# Patient Record
Sex: FEMALE | Race: OTHER | HISPANIC OR LATINO
[De-identification: names, ages, dates, MRNs, and addresses within clinical notes are randomized per-mention and may not be internally consistent; named-entity substitution may affect disease eponyms.]

---

## 2017-10-05 ENCOUNTER — APPOINTMENT (OUTPATIENT)
Dept: DERMATOLOGY | Facility: CLINIC | Age: 57
End: 2017-10-05
Payer: MEDICAID

## 2017-10-05 PROCEDURE — 99201 OFFICE OUTPATIENT NEW 10 MINUTES: CPT

## 2018-02-02 ENCOUNTER — OUTPATIENT (OUTPATIENT)
Dept: OUTPATIENT SERVICES | Facility: HOSPITAL | Age: 58
LOS: 1 days | End: 2018-02-02
Payer: COMMERCIAL

## 2018-02-02 ENCOUNTER — TRANSCRIPTION ENCOUNTER (OUTPATIENT)
Age: 58
End: 2018-02-02

## 2018-02-02 ENCOUNTER — RESULT REVIEW (OUTPATIENT)
Age: 58
End: 2018-02-02

## 2018-02-02 DIAGNOSIS — Z12.11 ENCOUNTER FOR SCREENING FOR MALIGNANT NEOPLASM OF COLON: ICD-10-CM

## 2018-02-02 DIAGNOSIS — Z98.89 OTHER SPECIFIED POSTPROCEDURAL STATES: Chronic | ICD-10-CM

## 2018-02-02 PROCEDURE — 88305 TISSUE EXAM BY PATHOLOGIST: CPT | Mod: 26

## 2018-02-02 PROCEDURE — T1013: CPT

## 2018-02-02 PROCEDURE — 45380 COLONOSCOPY AND BIOPSY: CPT | Mod: PT

## 2018-02-06 LAB — SURGICAL PATHOLOGY FINAL REPORT - CH: SIGNIFICANT CHANGE UP

## 2018-02-21 ENCOUNTER — OUTPATIENT (OUTPATIENT)
Dept: OUTPATIENT SERVICES | Facility: HOSPITAL | Age: 58
LOS: 1 days | End: 2018-02-21
Payer: COMMERCIAL

## 2018-02-21 DIAGNOSIS — Z51.89 ENCOUNTER FOR OTHER SPECIFIED AFTERCARE: ICD-10-CM

## 2018-02-21 DIAGNOSIS — M54.5 LOW BACK PAIN: ICD-10-CM

## 2018-02-21 DIAGNOSIS — Z98.89 OTHER SPECIFIED POSTPROCEDURAL STATES: Chronic | ICD-10-CM

## 2018-02-23 ENCOUNTER — APPOINTMENT (OUTPATIENT)
Dept: DERMATOLOGY | Facility: CLINIC | Age: 58
End: 2018-02-23
Payer: MEDICAID

## 2018-02-23 PROCEDURE — 17110 DESTRUCTION B9 LES UP TO 14: CPT

## 2018-02-23 PROCEDURE — 99213 OFFICE O/P EST LOW 20 MIN: CPT | Mod: 25

## 2018-02-27 ENCOUNTER — APPOINTMENT (OUTPATIENT)
Dept: DERMATOLOGY | Facility: CLINIC | Age: 58
End: 2018-02-27
Payer: MEDICAID

## 2018-02-27 PROCEDURE — 99214 OFFICE O/P EST MOD 30 MIN: CPT | Mod: 24

## 2018-03-06 ENCOUNTER — APPOINTMENT (OUTPATIENT)
Dept: DERMATOLOGY | Facility: CLINIC | Age: 58
End: 2018-03-06
Payer: MEDICAID

## 2018-03-06 PROCEDURE — 95044 PATCH/APPLICATION TESTS: CPT

## 2018-03-08 ENCOUNTER — APPOINTMENT (OUTPATIENT)
Dept: DERMATOLOGY | Facility: CLINIC | Age: 58
End: 2018-03-08
Payer: MEDICAID

## 2018-03-08 PROCEDURE — 99213 OFFICE O/P EST LOW 20 MIN: CPT

## 2018-03-10 ENCOUNTER — APPOINTMENT (OUTPATIENT)
Dept: DERMATOLOGY | Facility: CLINIC | Age: 58
End: 2018-03-10
Payer: MEDICAID

## 2018-03-10 PROCEDURE — 99213 OFFICE O/P EST LOW 20 MIN: CPT

## 2018-03-29 PROCEDURE — 97110 THERAPEUTIC EXERCISES: CPT

## 2018-03-29 PROCEDURE — 97162 PT EVAL MOD COMPLEX 30 MIN: CPT

## 2018-03-29 PROCEDURE — 97010 HOT OR COLD PACKS THERAPY: CPT

## 2018-03-29 PROCEDURE — 97140 MANUAL THERAPY 1/> REGIONS: CPT

## 2018-07-13 ENCOUNTER — EMERGENCY (EMERGENCY)
Facility: HOSPITAL | Age: 58
LOS: 1 days | Discharge: HOME | End: 2018-07-13
Attending: EMERGENCY MEDICINE
Payer: COMMERCIAL

## 2018-07-13 VITALS
HEIGHT: 60 IN | HEART RATE: 75 BPM | TEMPERATURE: 98 F | DIASTOLIC BLOOD PRESSURE: 81 MMHG | WEIGHT: 229.94 LBS | OXYGEN SATURATION: 96 % | SYSTOLIC BLOOD PRESSURE: 142 MMHG | RESPIRATION RATE: 18 BRPM

## 2018-07-13 DIAGNOSIS — Z98.89 OTHER SPECIFIED POSTPROCEDURAL STATES: Chronic | ICD-10-CM

## 2018-07-13 PROCEDURE — T1013: CPT

## 2018-07-13 PROCEDURE — 73562 X-RAY EXAM OF KNEE 3: CPT

## 2018-07-13 PROCEDURE — 99283 EMERGENCY DEPT VISIT LOW MDM: CPT

## 2018-07-13 PROCEDURE — 73562 X-RAY EXAM OF KNEE 3: CPT | Mod: 26,LT

## 2018-07-13 RX ORDER — ACETAMINOPHEN 500 MG
2 TABLET ORAL
Qty: 45 | Refills: 0 | OUTPATIENT
Start: 2018-07-13

## 2018-07-13 RX ORDER — AMLODIPINE BESYLATE 2.5 MG/1
1 TABLET ORAL
Qty: 0 | Refills: 0 | COMMUNITY

## 2018-07-13 RX ORDER — IBUPROFEN 200 MG
600 TABLET ORAL ONCE
Qty: 0 | Refills: 0 | Status: COMPLETED | OUTPATIENT
Start: 2018-07-13 | End: 2018-07-13

## 2018-07-13 RX ADMIN — Medication 600 MILLIGRAM(S): at 10:29

## 2018-07-13 NOTE — ED ADULT TRIAGE NOTE - CHIEF COMPLAINT QUOTE
c/o fell off a chair 4 mths ago , sustained a left knee injury that never got better, now c/o pain to low back and left knee, went to physical therapy for  few mths, not better

## 2018-07-13 NOTE — ED STATDOCS - PLAN OF CARE
Apply ice to affected area for 15-20 minutes every few hours for the next few days.  Use as much or as frequently as desired. Tylenol extra strength 2 tablets every 4 hours or Ibuprofen 600mg (3 tablets) every 6 hours as needed for aches, pains. Return immediately to the ER for re-evaluation if your symptoms recur or worsening. Otherwise, follow-up with PMD or orthopedics in 1-2 weeks for reassessment: call today to arrange an appointment

## 2018-07-13 NOTE — ED STATDOCS - CARE PLAN
Principal Discharge DX:	Acute pain of right knee  Assessment and plan of treatment:	Apply ice to affected area for 15-20 minutes every few hours for the next few days.  Use as much or as frequently as desired. Tylenol extra strength 2 tablets every 4 hours or Ibuprofen 600mg (3 tablets) every 6 hours as needed for aches, pains. Return immediately to the ER for re-evaluation if your symptoms recur or worsening. Otherwise, follow-up with PMD or orthopedics in 1-2 weeks for reassessment: call today to arrange an appointment

## 2018-07-13 NOTE — ED STATDOCS - MUSCULOSKELETAL, MLM
no gross deformity of extremity, FROM active and passive, (-) crepitus with ROM, (-) effusion, (-) ballottement,  (-) tenderness patella, (-) tenderness proximal fibula, (-) tenderness femoral condyles, no gross ligamentous instability:  (-) drawer/ lachmans, (-) lateral pivot, (-) Mike.

## 2018-07-13 NOTE — ED ADULT NURSE NOTE - PMH
High cholesterol    Hypertension    Morbid obesity with BMI of 40.0-44.9, adult    Panic attacks    Sleep apnea with use of continuous positive airway pressure (CPAP)

## 2018-07-13 NOTE — ED STATDOCS - OBJECTIVE STATEMENT
hospital  (nimesh): fall from chair 5 mths ago resulting in low back pain, went to PT twice a week for 4 wks with some improvement.  Reports pain in left knee for past 2 weeks.  Denies trauma or injury.  Denies prior knee problems.  Also with continued back pain.  Taking OTC meds (cannot provide name).  PMD: Radha.

## 2018-07-13 NOTE — ED ADULT NURSE NOTE - OBJECTIVE STATEMENT
Patient arrived to ED today with c/o lower back pain and left leg pain after falling off a plastic chair 4 months ago.  Patient states she went to therapy and still has pain.  Patient denies LOC during fall.  Patient denies chest pain, SOB, numbness or tingling, headache, dizziness.
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
## 2018-08-30 ENCOUNTER — OUTPATIENT (OUTPATIENT)
Dept: OUTPATIENT SERVICES | Facility: HOSPITAL | Age: 58
LOS: 1 days | End: 2018-08-30
Payer: COMMERCIAL

## 2018-08-30 DIAGNOSIS — M25.562 PAIN IN LEFT KNEE: ICD-10-CM

## 2018-08-30 DIAGNOSIS — Z98.89 OTHER SPECIFIED POSTPROCEDURAL STATES: Chronic | ICD-10-CM

## 2018-08-30 DIAGNOSIS — Z51.89 ENCOUNTER FOR OTHER SPECIFIED AFTERCARE: ICD-10-CM

## 2018-09-13 PROCEDURE — 97010 HOT OR COLD PACKS THERAPY: CPT

## 2018-09-13 PROCEDURE — 97140 MANUAL THERAPY 1/> REGIONS: CPT

## 2018-09-13 PROCEDURE — 97162 PT EVAL MOD COMPLEX 30 MIN: CPT

## 2018-09-13 PROCEDURE — 97110 THERAPEUTIC EXERCISES: CPT

## 2018-10-23 ENCOUNTER — OUTPATIENT (OUTPATIENT)
Dept: OUTPATIENT SERVICES | Facility: HOSPITAL | Age: 58
LOS: 1 days | End: 2018-10-23
Payer: COMMERCIAL

## 2018-10-23 DIAGNOSIS — Z98.89 OTHER SPECIFIED POSTPROCEDURAL STATES: Chronic | ICD-10-CM

## 2018-10-23 DIAGNOSIS — Z51.89 ENCOUNTER FOR OTHER SPECIFIED AFTERCARE: ICD-10-CM

## 2018-10-23 DIAGNOSIS — M54.5 LOW BACK PAIN: ICD-10-CM

## 2018-12-19 PROCEDURE — 97140 MANUAL THERAPY 1/> REGIONS: CPT

## 2018-12-19 PROCEDURE — 97010 HOT OR COLD PACKS THERAPY: CPT

## 2018-12-19 PROCEDURE — 97162 PT EVAL MOD COMPLEX 30 MIN: CPT

## 2018-12-19 PROCEDURE — 97110 THERAPEUTIC EXERCISES: CPT

## 2019-04-05 ENCOUNTER — NON-APPOINTMENT (OUTPATIENT)
Age: 59
End: 2019-04-05

## 2019-04-05 ENCOUNTER — APPOINTMENT (OUTPATIENT)
Dept: INTERNAL MEDICINE | Facility: CLINIC | Age: 59
End: 2019-04-05
Payer: MEDICAID

## 2019-04-05 VITALS
TEMPERATURE: 98.1 F | RESPIRATION RATE: 14 BRPM | HEART RATE: 78 BPM | SYSTOLIC BLOOD PRESSURE: 124 MMHG | OXYGEN SATURATION: 97 % | DIASTOLIC BLOOD PRESSURE: 72 MMHG | HEIGHT: 61 IN | BODY MASS INDEX: 43.23 KG/M2 | WEIGHT: 229 LBS

## 2019-04-05 DIAGNOSIS — M65.30 TRIGGER FINGER, UNSPECIFIED FINGER: ICD-10-CM

## 2019-04-05 DIAGNOSIS — Z83.49 FAMILY HISTORY OF OTHER ENDOCRINE, NUTRITIONAL AND METABOLIC DISEASES: ICD-10-CM

## 2019-04-05 DIAGNOSIS — Z83.3 FAMILY HISTORY OF DIABETES MELLITUS: ICD-10-CM

## 2019-04-05 DIAGNOSIS — S61.209A LACERATION OF EXTENSOR MUSCLE, FASCIA AND TENDON OF UNSPECIFIED FINGER AT FOREARM LEVEL, INITIAL ENCOUNTER: ICD-10-CM

## 2019-04-05 DIAGNOSIS — M65.341 TRIGGER FINGER, RIGHT RING FINGER: ICD-10-CM

## 2019-04-05 DIAGNOSIS — Z82.49 FAMILY HISTORY OF ISCHEMIC HEART DISEASE AND OTHER DISEASES OF THE CIRCULATORY SYSTEM: ICD-10-CM

## 2019-04-05 DIAGNOSIS — Z83.79 FAMILY HISTORY OF OTHER DISEASES OF THE DIGESTIVE SYSTEM: ICD-10-CM

## 2019-04-05 DIAGNOSIS — S56.429A LACERATION OF EXTENSOR MUSCLE, FASCIA AND TENDON OF UNSPECIFIED FINGER AT FOREARM LEVEL, INITIAL ENCOUNTER: ICD-10-CM

## 2019-04-05 DIAGNOSIS — G56.01 CARPAL TUNNEL SYNDROME, RIGHT UPPER LIMB: ICD-10-CM

## 2019-04-05 PROCEDURE — 93000 ELECTROCARDIOGRAM COMPLETE: CPT

## 2019-04-05 PROCEDURE — 99386 PREV VISIT NEW AGE 40-64: CPT | Mod: 25

## 2019-04-05 PROCEDURE — 96127 BRIEF EMOTIONAL/BEHAV ASSMT: CPT

## 2019-04-05 PROCEDURE — G0447 BEHAVIOR COUNSEL OBESITY 15M: CPT

## 2019-04-06 NOTE — COUNSELING
[Weight management counseling provided] : Weight management [Healthy eating counseling provided] : healthy eating [ - Behavioral Counseling for Obesity (Face-to-Face for 15 Minutes)] : Behavioral Counseling for Obesity (Face-to-Face for 15 Minutes) [ - Annual Depression Screening] : Annual Depression Screening

## 2019-04-06 NOTE — HISTORY OF PRESENT ILLNESS
[FreeTextEntry1] : Here for annual physical [de-identified] : Here for annual physical and to establish care.  She was previously followed by Dr Garcia\par \par She states she has been having left knee pain.  She states she fell off a chair about 6 months ago.  She has seen orthopedics and had MRI.  She states she also had knee fluid aspirated.  She states she also got knee injection but sx persist.  She sees Dr Mohan (orthopedics) at [facility].  She states she was going to get another injection and if that did not work she may need surgery\par \par She states she has hx of anxiety and sees therapist.  She is on disability for anxiety\par \par She states she has a hx of SEEMA but does not use CPAP because she does not like they way her mask feels

## 2019-04-06 NOTE — REVIEW OF SYSTEMS
[Anxiety] : anxiety [Negative] : Neurological [Fever] : no fever [Chills] : no chills [Recent Change In Weight] : ~T no recent weight change [Earache] : no earache [Nasal Discharge] : no nasal discharge [Sore Throat] : no sore throat [Chest Pain] : no chest pain [Palpitations] : no palpitations [Lower Ext Edema] : no lower extremity edema [Shortness Of Breath] : no shortness of breath [Wheezing] : no wheezing [Cough] : no cough [Dyspnea on Exertion] : no dyspnea on exertion [Abdominal Pain] : no abdominal pain [Nausea] : no nausea [Diarrhea] : diarrhea [Vomiting] : no vomiting [Suicidal] : not suicidal [Insomnia] : no insomnia [Depression] : no depression [FreeTextEntry9] : see HPI

## 2019-04-06 NOTE — HEALTH RISK ASSESSMENT
[Any fall with injury in past year] : Patient reported fall with injury in the past year [0] : 2) Feeling down, depressed, or hopeless: Not at all (0) [Patient reported mammogram was normal] : Patient reported mammogram was normal [Patient reported PAP Smear was normal] : Patient reported PAP Smear was normal [HIV Test offered] : HIV Test offered [Hepatitis C test offered] : Hepatitis C test offered [Alone] : lives alone [Unemployed] : unemployed [On disability] : on disability [Single] : single [Patient reported colonoscopy was abnormal] : Patient reported colonoscopy was abnormal [] : No [de-identified] : None [[id number]] : 0 [MammogramDate] : 01/19 [PapSmearDate] : 07/18 [ColonoscopyDate] : 02/18 [ColonoscopyComments] : 2 polyps [de-identified] : on disability because of anxiety

## 2019-04-06 NOTE — ASSESSMENT
[FreeTextEntry1] : \par \par HTN:\par -BP at goal on amlodipine 10mg PO daily\par \par Left knee pain:\par -she is under the care of Orthopedics and is scheduled for follow up\par -she is using voltaren gel and tylenol\par -will try to get ortho records\par \par SEEMA:\par -will refer to pulmonary\par \par Anxiety:\par -she is on no meds\par -she is followed by therapist and will make f/u appt\par -PHQ 2 score 0\par \par Morbid Obesity:\par -BMI 43\par -I advised low fat/low cholesterol diet and low carb diet\par -benefits of weight loss advised\par -risks of obesity discussed\par -she appears to be seeing plastic surgeon and plans of getting laser procedure to treat arms to remove excess fat\par \par Hx of colon polyps:\par -had colonoscopy 2018-had one hyperplastic polyp and one tubular adenoma\par -counseling time 15 minutes\par \par Abnormal EKG\par -EKG today shows NSR at 75 with poor R wave progression\par -I have advised she see cardiology prior to starting exercise program\par -she denies chest pain, sob, palpitations\par \par \par HCM:\par \par CPE: 2019\par \par Depression screenin2019 negative (PHQ 2 score 0)\par \par EK2019\par \par Flu shot: she declined\par \par Tdap: she will check records\par \par Shingles vaccine: she will check records\par \par HIV testing: offered -she consented to testing 2019\par \par Hepatitis C screening: ordered today\par \par Colonoscopy: 2018--had one hyperplastic polyp and one tubular adenoma\par \par Mammogram: 2019\par \par GYN/PAP: 2018\par \par DEXA: ordered today\par \par Fasting labs ordered today-she will have done at lab\par \par F?U 3 months.  Will try to get records from previous PMD and Ortho\par

## 2019-04-06 NOTE — PHYSICAL EXAM
[No Acute Distress] : no acute distress [Well Nourished] : well nourished [Well Developed] : well developed [Well-Appearing] : well-appearing [Normal Voice/Communication] : normal voice/communication [Normal Sclera/Conjunctiva] : normal sclera/conjunctiva [PERRL] : pupils equal round and reactive to light [EOMI] : extraocular movements intact [Normal Outer Ear/Nose] : the outer ears and nose were normal in appearance [Normal Oropharynx] : the oropharynx was normal [Normal TMs] : both tympanic membranes were normal [Normal Nasal Mucosa] : the nasal mucosa was normal [No JVD] : no jugular venous distention [Supple] : supple [No Lymphadenopathy] : no lymphadenopathy [Thyroid Normal, No Nodules] : the thyroid was normal and there were no nodules present [No Respiratory Distress] : no respiratory distress  [Clear to Auscultation] : lungs were clear to auscultation bilaterally [No Accessory Muscle Use] : no accessory muscle use [Normal Rate] : normal rate  [Regular Rhythm] : with a regular rhythm [Normal S1, S2] : normal S1 and S2 [No Murmur] : no murmur heard [No Carotid Bruits] : no carotid bruits [No Edema] : there was no peripheral edema [No Extremity Clubbing/Cyanosis] : no extremity clubbing/cyanosis [Soft] : abdomen soft [Non Tender] : non-tender [Non-distended] : non-distended [No Masses] : no abdominal mass palpated [No HSM] : no HSM [Normal Bowel Sounds] : normal bowel sounds [Normal Posterior Cervical Nodes] : no posterior cervical lymphadenopathy [Normal Anterior Cervical Nodes] : no anterior cervical lymphadenopathy [No CVA Tenderness] : no CVA  tenderness [No Spinal Tenderness] : no spinal tenderness [No Joint Swelling] : no joint swelling [No Rash] : no rash [No Skin Lesions] : no skin lesions [No Focal Deficits] : no focal deficits [Normal Affect] : the affect was normal [Alert and Oriented x3] : oriented to person, place, and time [Normal Mood] : the mood was normal [Normal Insight/Judgement] : insight and judgment were intact [de-identified] : left knee with no tenderness or swelling

## 2019-04-25 ENCOUNTER — APPOINTMENT (OUTPATIENT)
Dept: DERMATOLOGY | Facility: CLINIC | Age: 59
End: 2019-04-25
Payer: MEDICAID

## 2019-04-25 PROCEDURE — 99214 OFFICE O/P EST MOD 30 MIN: CPT

## 2019-07-08 ENCOUNTER — APPOINTMENT (OUTPATIENT)
Dept: INTERNAL MEDICINE | Facility: CLINIC | Age: 59
End: 2019-07-08

## 2019-10-01 ENCOUNTER — OUTPATIENT (OUTPATIENT)
Dept: OUTPATIENT SERVICES | Facility: HOSPITAL | Age: 59
LOS: 1 days | End: 2019-10-01
Payer: MEDICAID

## 2019-10-01 DIAGNOSIS — Z98.89 OTHER SPECIFIED POSTPROCEDURAL STATES: Chronic | ICD-10-CM

## 2019-10-01 PROCEDURE — G9001: CPT

## 2019-10-16 DIAGNOSIS — Z71.89 OTHER SPECIFIED COUNSELING: ICD-10-CM

## 2019-10-30 ENCOUNTER — LABORATORY RESULT (OUTPATIENT)
Age: 59
End: 2019-10-30

## 2019-10-31 ENCOUNTER — APPOINTMENT (OUTPATIENT)
Dept: INTERNAL MEDICINE | Facility: CLINIC | Age: 59
End: 2019-10-31
Payer: MEDICAID

## 2019-10-31 VITALS
OXYGEN SATURATION: 96 % | WEIGHT: 245 LBS | BODY MASS INDEX: 46.26 KG/M2 | HEIGHT: 61 IN | SYSTOLIC BLOOD PRESSURE: 148 MMHG | RESPIRATION RATE: 15 BRPM | HEART RATE: 70 BPM | TEMPERATURE: 98.2 F | DIASTOLIC BLOOD PRESSURE: 90 MMHG

## 2019-10-31 DIAGNOSIS — M25.562 PAIN IN LEFT KNEE: ICD-10-CM

## 2019-10-31 DIAGNOSIS — Z23 ENCOUNTER FOR IMMUNIZATION: ICD-10-CM

## 2019-10-31 PROCEDURE — 36415 COLL VENOUS BLD VENIPUNCTURE: CPT

## 2019-10-31 PROCEDURE — 90471 IMMUNIZATION ADMIN: CPT

## 2019-10-31 PROCEDURE — 90715 TDAP VACCINE 7 YRS/> IM: CPT

## 2019-10-31 PROCEDURE — 99214 OFFICE O/P EST MOD 30 MIN: CPT | Mod: 25
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
## 2019-10-31 NOTE — HEALTH RISK ASSESSMENT
[Patient reported mammogram was normal] : Patient reported mammogram was normal [Patient reported PAP Smear was normal] : Patient reported PAP Smear was normal [MammogramDate] : 07/19 [PapSmearDate] : 07/19
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
## 2019-10-31 NOTE — PHYSICAL EXAM
[No Acute Distress] : no acute distress [Well-Appearing] : well-appearing [Normal Voice/Communication] : normal voice/communication [Normal Sclera/Conjunctiva] : normal sclera/conjunctiva [PERRL] : pupils equal round and reactive to light [Normal Oropharynx] : the oropharynx was normal [No JVD] : no jugular venous distention [Supple] : supple [No Lymphadenopathy] : no lymphadenopathy [Thyroid Normal, No Nodules] : the thyroid was normal and there were no nodules present [No Respiratory Distress] : no respiratory distress  [Clear to Auscultation] : lungs were clear to auscultation bilaterally [No Accessory Muscle Use] : no accessory muscle use [Normal Rate] : normal rate  [Regular Rhythm] : with a regular rhythm [Normal S1, S2] : normal S1 and S2 [No Murmur] : no murmur heard [No Edema] : there was no peripheral edema [No Focal Deficits] : no focal deficits [Normal Affect] : the affect was normal [Alert and Oriented x3] : oriented to person, place, and time [Normal Mood] : the mood was normal [Normal Insight/Judgement] : insight and judgment were intact [de-identified] : Pt gowned for exam, MARIN Pacheco present for exam-her left breast was examined and at 12 oclock position she has a 1cm x 1 cm ulceration with scab and approx 3 cm x 3 cm area of erythema, no tenderness, no discharge, no mass [de-identified] : see breast exam

## 2019-10-31 NOTE — REVIEW OF SYSTEMS
[Recent Change In Weight] : ~T recent weight change [Negative] : Psychiatric [Fever] : no fever [Chills] : no chills [Chest Pain] : no chest pain [Palpitations] : no palpitations [Lower Ext Edema] : no lower extremity edema [Shortness Of Breath] : no shortness of breath [Wheezing] : no wheezing [Cough] : no cough [Dyspnea on Exertion] : no dyspnea on exertion [Abdominal Pain] : no abdominal pain [Nausea] : no nausea [Diarrhea] : diarrhea [Vomiting] : no vomiting [FreeTextEntry2] : weight gain [de-identified] : see HPI

## 2019-11-19 ENCOUNTER — RESULT REVIEW (OUTPATIENT)
Age: 59
End: 2019-11-19

## 2019-12-05 ENCOUNTER — APPOINTMENT (OUTPATIENT)
Dept: INTERNAL MEDICINE | Facility: CLINIC | Age: 59
End: 2019-12-05
Payer: MEDICAID

## 2019-12-05 VITALS
TEMPERATURE: 98.7 F | HEIGHT: 61 IN | HEART RATE: 77 BPM | DIASTOLIC BLOOD PRESSURE: 87 MMHG | SYSTOLIC BLOOD PRESSURE: 138 MMHG | BODY MASS INDEX: 45.69 KG/M2 | OXYGEN SATURATION: 97 % | WEIGHT: 242 LBS

## 2019-12-05 DIAGNOSIS — N61.0 MASTITIS WITHOUT ABSCESS: ICD-10-CM

## 2019-12-05 DIAGNOSIS — R09.82 POSTNASAL DRIP: ICD-10-CM

## 2019-12-05 DIAGNOSIS — Z87.828 PERSONAL HISTORY OF OTHER (HEALED) PHYSICAL INJURY AND TRAUMA: ICD-10-CM

## 2019-12-05 PROCEDURE — G0447 BEHAVIOR COUNSEL OBESITY 15M: CPT

## 2019-12-05 PROCEDURE — 99214 OFFICE O/P EST MOD 30 MIN: CPT | Mod: 25

## 2019-12-05 PROCEDURE — 36415 COLL VENOUS BLD VENIPUNCTURE: CPT

## 2019-12-05 RX ORDER — CEPHALEXIN 500 MG/1
500 CAPSULE ORAL
Qty: 14 | Refills: 0 | Status: DISCONTINUED | COMMUNITY
Start: 2019-10-31 | End: 2019-12-05

## 2019-12-05 NOTE — ADDENDUM
[FreeTextEntry1] : I, Izzy Dunbar, acted solely as a scribe for Dr. Kearney on this date [12/5/2019].\par

## 2019-12-05 NOTE — END OF VISIT
[FreeTextEntry3] : All medical entries made by the Scribe were at my, Dr. Ian Kearney's, direction and personally dictated by me on [12/5/2019]. I have reviewed the chart and agree that the record accurately reflects my personal performance of the history, physical exam, assessment and plan. I have also personally directed, reviewed, and agreed with the chart.\par

## 2019-12-05 NOTE — ASSESSMENT
[FreeTextEntry1] : Allergic rhinitis/Post nasal drip/Cough:\par -will tx with Flonase nasal speay 2 sprays each nostril daily prn\par -will order CXR \par -she is to notify office if sx persist or worsen\par \par HTN:\par -BP elevated today- she had been out of her medication for 2 days\par -complaince with medication advised \par -she lost 3 Ibs \par -she is on amlodipine 10mg PO daily and HCTZ 12.5mg PO daily which I advised she continue\par -will check BMP\par -I advised low fat/low cholesterol diet, low salt diet, and weight loss\par -f/u 3 months for BP check\par \par Left knee pain:\par -she is under the care of Orthopedics and she states plan is for her to get injections \par -she is using Voltaren gel and Tylenol\par \par SEEAM:\par -she has been referred to pulmonary\par \par Anxiety:\par -sees therapist \par \par Morbid Obesity/pre-DM:\par -BMI 45\par -HgA1c 6.0\par -risks of obesity discussed\par -benefits of weight loss discussed\par -low fat/low chol diet and low carbohydrate diet advised\par -small portion sizes encouraged\par -I advised avoidance of sugary drinks\par -aerobic exercise encouraged\par -weight loss advised\par -counseling time 15 minutes\par \par Abnormal EKG\par -previous EKG showed NSR at 75 with poor R wave progression\par -I have advised she see cardiology again today- she states she will make appt \par -she denies chest pain, sob, palpitations\par \par Elevated TSH:\par -will check TFTS and thyroid antibodies\par \par Hypertriglyceridemia:\par -Trig 173\par -she should adhere to low fat/low cholesterol diet, low carbohydrate diet and weight loss advised\par \par \par \par HCM:\par \par CPE: 2019\par \par Depression screenin2019 negative (PHQ 2 score 0)\par \par EK2019\par \par Flu shot: advised 10/31/2019-she declined\par \par Tdap:  10/31/2019\par \par Shingles vaccine: she will check records\par \par HIV testing: negative 10/2019\par \par Hepatitis C screening: negative 10/2019\par \par Colonoscopy: 2018--had one hyperplastic polyp and one tubular adenoma\par \par Mammogram: 2019 -will order mammogram due 2020\par \par GYN/PAP: 2019- normal per pt\par \par DEXA: ordered previously\par \par \par F/U 3 months for BP check and weight check.  Labs drawn in office today\par

## 2019-12-05 NOTE — REVIEW OF SYSTEMS
[Postnasal Drip] : postnasal drip [Cough] : cough [Nasal Discharge] : nasal discharge [Negative] : Integumentary [Chills] : no chills [Fever] : no fever [Earache] : no earache [Fatigue] : no fatigue [Sore Throat] : no sore throat [Chest Pain] : no chest pain [Palpitations] : no palpitations [Lower Ext Edema] : no lower extremity edema [Shortness Of Breath] : no shortness of breath [Wheezing] : no wheezing [Abdominal Pain] : no abdominal pain [Dyspnea on Exertion] : no dyspnea on exertion [Nausea] : no nausea [Vomiting] : no vomiting [Diarrhea] : diarrhea

## 2019-12-05 NOTE — COUNSELING
[Benefits of weight loss discussed] : Benefits of weight loss discussed [Potential consequences of obesity discussed] : Potential consequences of obesity discussed [Good understanding] : Patient has a good understanding of disease, goals and obesity follow-up plan [FreeTextEntry4] : 15

## 2019-12-05 NOTE — PHYSICAL EXAM
[No JVD] : no jugular venous distention [No Lymphadenopathy] : no lymphadenopathy [Supple] : supple [No Respiratory Distress] : no respiratory distress  [Thyroid Normal, No Nodules] : the thyroid was normal and there were no nodules present [Clear to Auscultation] : lungs were clear to auscultation bilaterally [No Accessory Muscle Use] : no accessory muscle use [Normal Rate] : normal rate  [Normal S1, S2] : normal S1 and S2 [Regular Rhythm] : with a regular rhythm [No Murmur] : no murmur heard [de-identified] : Pt gowned for exam, MARIN Pacheco present for exam-her left breast was examined and at 12 oclock position she has a 1cm x 1 cm ulceration with scab and approx 3 cm x 3 cm area of erythema, no tenderness, no discharge, no mass [de-identified] : see breast exam [No Acute Distress] : no acute distress [Well-Appearing] : well-appearing [Normal Voice/Communication] : normal voice/communication [Normal Sclera/Conjunctiva] : normal sclera/conjunctiva [PERRL] : pupils equal round and reactive to light [Normal Oropharynx] : the oropharynx was normal [Normal] : normal rate, regular rhythm, normal S1 and S2 and no murmur heard [No Edema] : there was no peripheral edema [Soft] : abdomen soft [Non Tender] : non-tender [Non-distended] : non-distended [No Masses] : no abdominal mass palpated [No HSM] : no HSM [Normal Bowel Sounds] : normal bowel sounds [No Rash] : no rash [No Focal Deficits] : no focal deficits [Normal Affect] : the affect was normal [Alert and Oriented x3] : oriented to person, place, and time [Normal Mood] : the mood was normal [Normal Insight/Judgement] : insight and judgment were intact [de-identified] : nasal mucosa is edematous, turbinates with blue hue

## 2019-12-05 NOTE — HISTORY OF PRESENT ILLNESS
[de-identified] : Here for BP follow up.  She states she did not get chance to go to pharmacy and she has been out of her BP meds x 2 days.  She was able to pick them up today\par \par She c/o of nasal congestion and phlegm that causes her to cough. She states that this has been ongoing for a while now-approx 2 months. She has not been taking anything to alleviate sx. She denies fever/chills, earache, sore throat, wheezing, or SOB. She does not smoke cigarettes\par \par She reports she is having a hard time losing weight [FreeTextEntry1] : BP check

## 2019-12-06 LAB
ANION GAP SERPL CALC-SCNC: 12 MMOL/L
BUN SERPL-MCNC: 19 MG/DL
CALCIUM SERPL-MCNC: 9.8 MG/DL
CHLORIDE SERPL-SCNC: 102 MMOL/L
CO2 SERPL-SCNC: 26 MMOL/L
CREAT SERPL-MCNC: 0.84 MG/DL
GLUCOSE SERPL-MCNC: 132 MG/DL
POTASSIUM SERPL-SCNC: 4.1 MMOL/L
SODIUM SERPL-SCNC: 140 MMOL/L
T4 FREE SERPL-MCNC: 1.1 NG/DL
TSH SERPL-ACNC: 3.79 UIU/ML

## 2019-12-12 LAB
THYROGLOB AB SERPL-ACNC: <20 IU/ML
THYROPEROXIDASE AB SERPL IA-ACNC: 2510 IU/ML

## 2020-01-29 ENCOUNTER — APPOINTMENT (OUTPATIENT)
Dept: DERMATOLOGY | Facility: CLINIC | Age: 60
End: 2020-01-29

## 2020-02-19 ENCOUNTER — FORM ENCOUNTER (OUTPATIENT)
Age: 60
End: 2020-02-19

## 2020-02-20 ENCOUNTER — OUTPATIENT (OUTPATIENT)
Dept: OUTPATIENT SERVICES | Facility: HOSPITAL | Age: 60
LOS: 1 days | End: 2020-02-20
Payer: MEDICAID

## 2020-02-20 ENCOUNTER — APPOINTMENT (OUTPATIENT)
Dept: RADIOLOGY | Facility: CLINIC | Age: 60
End: 2020-02-20
Payer: MEDICAID

## 2020-02-20 DIAGNOSIS — R05 COUGH: ICD-10-CM

## 2020-02-20 DIAGNOSIS — Z98.89 OTHER SPECIFIED POSTPROCEDURAL STATES: Chronic | ICD-10-CM

## 2020-02-20 PROCEDURE — 71046 X-RAY EXAM CHEST 2 VIEWS: CPT

## 2020-02-20 PROCEDURE — 71046 X-RAY EXAM CHEST 2 VIEWS: CPT | Mod: 26

## 2020-03-05 ENCOUNTER — FORM ENCOUNTER (OUTPATIENT)
Age: 60
End: 2020-03-05

## 2020-03-06 ENCOUNTER — APPOINTMENT (OUTPATIENT)
Dept: RADIOLOGY | Facility: CLINIC | Age: 60
End: 2020-03-06
Payer: MEDICAID

## 2020-03-06 ENCOUNTER — OUTPATIENT (OUTPATIENT)
Dept: OUTPATIENT SERVICES | Facility: HOSPITAL | Age: 60
LOS: 1 days | End: 2020-03-06
Payer: MEDICAID

## 2020-03-06 DIAGNOSIS — Z00.00 ENCOUNTER FOR GENERAL ADULT MEDICAL EXAMINATION WITHOUT ABNORMAL FINDINGS: ICD-10-CM

## 2020-03-06 DIAGNOSIS — Z98.89 OTHER SPECIFIED POSTPROCEDURAL STATES: Chronic | ICD-10-CM

## 2020-03-06 PROCEDURE — 77080 DXA BONE DENSITY AXIAL: CPT | Mod: 26

## 2020-03-06 PROCEDURE — 77080 DXA BONE DENSITY AXIAL: CPT

## 2020-03-10 ENCOUNTER — APPOINTMENT (OUTPATIENT)
Dept: INTERNAL MEDICINE | Facility: CLINIC | Age: 60
End: 2020-03-10
Payer: MEDICAID

## 2020-03-10 VITALS
DIASTOLIC BLOOD PRESSURE: 78 MMHG | HEIGHT: 61 IN | TEMPERATURE: 97.8 F | HEART RATE: 89 BPM | OXYGEN SATURATION: 97 % | WEIGHT: 245 LBS | SYSTOLIC BLOOD PRESSURE: 128 MMHG | RESPIRATION RATE: 16 BRPM | BODY MASS INDEX: 46.26 KG/M2

## 2020-03-10 PROCEDURE — 99215 OFFICE O/P EST HI 40 MIN: CPT | Mod: 25

## 2020-03-10 PROCEDURE — 96127 BRIEF EMOTIONAL/BEHAV ASSMT: CPT

## 2020-03-10 NOTE — HISTORY OF PRESENT ILLNESS
[de-identified] : Here today for follow up\par \par She states she has chronic left knee pain but states she now has pain in right knee.  She states she saw orthopedics this week and had xrays and told she had OA.  She was advised to take tylenol and start PT.  She states she is awaiting to hear if knee injections are covered.  She is also taking glucosamine chondroitin\par \par She states she is having a hard time losing weight\par \par She reports she has had mild anxiety and depression.  She states she stopped seeing her therapist.  She denies SI/HI.  She states she has tried multiple meds in past and got side effects and does not wish to restart

## 2020-03-10 NOTE — REVIEW OF SYSTEMS
[Negative] : Respiratory [Fever] : no fever [Chills] : no chills [Fatigue] : no fatigue [Chest Pain] : no chest pain [Palpitations] : no palpitations [Lower Ext Edema] : no lower extremity edema [Shortness Of Breath] : no shortness of breath [Wheezing] : no wheezing [Cough] : no cough [Dyspnea on Exertion] : no dyspnea on exertion [Abdominal Pain] : no abdominal pain [Nausea] : no nausea [Diarrhea] : diarrhea [Vomiting] : no vomiting [FreeTextEntry9] : B/L knee pain

## 2020-03-10 NOTE — PHYSICAL EXAM
[No Acute Distress] : no acute distress [Well-Appearing] : well-appearing [Normal Voice/Communication] : normal voice/communication [Normal Sclera/Conjunctiva] : normal sclera/conjunctiva [PERRL] : pupils equal round and reactive to light [Normal Oropharynx] : the oropharynx was normal [Normal] : normal rate, regular rhythm, normal S1 and S2 and no murmur heard [No Edema] : there was no peripheral edema [No Rash] : no rash [No Focal Deficits] : no focal deficits [Normal Affect] : the affect was normal [Alert and Oriented x3] : oriented to person, place, and time [Normal Mood] : the mood was normal [Normal Insight/Judgement] : insight and judgment were intact [No Joint Swelling] : no joint swelling [de-identified] : Obese

## 2020-03-10 NOTE — ASSESSMENT
[FreeTextEntry1] : Allergic rhinitis/Post nasal drip/Cough:\par sx have resolved\par -Flonase nasal spray prn\par -CXR was normal\par -she is to notify office if sx persist or worsen\par \par HTN:\par -BP at goal today\par -she is on amlodipine 10mg PO daily and HCTZ 12.5mg PO daily-will refill\par -I advised low fat/low cholesterol diet, low salt diet, and weight loss\par \par B/L knee pain:\par -she is under the care of Orthopedics and she states plan is for her to get injections \par -Will refill tylenol\par -I advised she start PT as per ortho recommendations\par -weight loss advised\par \par SEEMA:\par -she has been referred to pulmonary-she has not made appt yet\par \par Anxiety/Depression:\par -no longer seeing therapist\par -PHQ 9 score 6\par -tx options discussed\par -she does not want meds\par -will refer to psychologist\par -f/u 6 weeks\par \par Morbid Obesity/pre-DM:\par -BMI 46\par -HgA1c 6.0\par -risks of obesity discussed\par -benefits of weight loss discussed\par -low fat/low chol diet and low carbohydrate diet advised\par -small portion sizes encouraged\par -I advised avoidance of sugary drinks\par -weight loss advised\par -will refer to [facility]\par \par Abnormal EKG\par -previous EKG showed NSR at 75 with poor R wave progression\par -I have advised she see cardiology again today-she states she has not had time to make appt-she states she will make appt \par -she denies chest pain, sob, palpitations\par \par Elevated TSH:\par -repeat TFTs were normal but her thyroid abs were positive\par -will check TSH\par \par Hypertriglyceridemia:\par -fasting labs ordered\par -she should adhere to low fat/low cholesterol diet, low carbohydrate diet and weight loss advised\par \par \par \par HCM:\par \par CPE: 2019\par \par Depression screenin2019 negative (PHQ 6 score 6)\par \par EK2019\par \par Flu shot: advised 10/31/2019-she declined\par \par Tdap:  10/31/2019\par \par Shingles vaccine: she will check records\par \par HIV testing: negative 10/2019\par \par Hepatitis C screening: negative 10/2019\par \par Colonoscopy: 2018--had one hyperplastic polyp and one tubular adenoma\par \par Mammogram: 2020 BR 2\par \par GYN/PAP: 2019- normal per pt\par \par DEXA: 3/2020 normal\par \par \par F/U 6 weeks.  Fasting labs ordered and she will have done at the lab\par

## 2020-05-26 ENCOUNTER — APPOINTMENT (OUTPATIENT)
Dept: INTERNAL MEDICINE | Facility: CLINIC | Age: 60
End: 2020-05-26
Payer: MEDICAID

## 2020-05-26 VITALS
OXYGEN SATURATION: 98 % | BODY MASS INDEX: 45.69 KG/M2 | HEIGHT: 61 IN | WEIGHT: 242 LBS | SYSTOLIC BLOOD PRESSURE: 130 MMHG | TEMPERATURE: 98.1 F | HEART RATE: 81 BPM | DIASTOLIC BLOOD PRESSURE: 78 MMHG

## 2020-05-26 DIAGNOSIS — M25.561 PAIN IN RIGHT KNEE: ICD-10-CM

## 2020-05-26 DIAGNOSIS — M25.562 PAIN IN RIGHT KNEE: ICD-10-CM

## 2020-05-26 DIAGNOSIS — K59.00 CONSTIPATION, UNSPECIFIED: ICD-10-CM

## 2020-05-26 DIAGNOSIS — Z11.59 ENCOUNTER FOR SCREENING FOR OTHER VIRAL DISEASES: ICD-10-CM

## 2020-05-26 PROCEDURE — 36415 COLL VENOUS BLD VENIPUNCTURE: CPT

## 2020-05-26 PROCEDURE — 99214 OFFICE O/P EST MOD 30 MIN: CPT | Mod: 25

## 2020-05-26 NOTE — PHYSICAL EXAM
[No Acute Distress] : no acute distress [Well-Appearing] : well-appearing [Normal Voice/Communication] : normal voice/communication [Normal Sclera/Conjunctiva] : normal sclera/conjunctiva [PERRL] : pupils equal round and reactive to light [Normal Oropharynx] : the oropharynx was normal [Normal] : no respiratory distress, lungs were clear to auscultation bilaterally and no accessory muscle use [No Edema] : there was no peripheral edema [No Joint Swelling] : no joint swelling [No Rash] : no rash [No Focal Deficits] : no focal deficits [Normal Affect] : the affect was normal [Normal Mood] : the mood was normal [Alert and Oriented x3] : oriented to person, place, and time [Normal Insight/Judgement] : insight and judgment were intact [Soft] : abdomen soft [Non Tender] : non-tender [No Masses] : no abdominal mass palpated [Non-distended] : non-distended [Normal Bowel Sounds] : normal bowel sounds [No HSM] : no HSM [No Skin Lesions] : no skin lesions [de-identified] : Obese [de-identified] : B/L knees with no swelling or tenderness

## 2020-05-26 NOTE — REVIEW OF SYSTEMS
[Chills] : no chills [Fever] : no fever [Fatigue] : no fatigue [Palpitations] : no palpitations [Chest Pain] : no chest pain [Lower Ext Edema] : no lower extremity edema [Shortness Of Breath] : no shortness of breath [Wheezing] : no wheezing [Cough] : no cough [Abdominal Pain] : no abdominal pain [Dyspnea on Exertion] : no dyspnea on exertion [Nausea] : no nausea [Vomiting] : no vomiting [Diarrhea] : diarrhea [Constipation] : constipation [Insomnia] : no insomnia [Suicidal] : not suicidal [Anxiety] : no anxiety [Depression] : depression [Negative] : ENT [FreeTextEntry9] : B/L knee pain

## 2020-05-26 NOTE — ASSESSMENT
[FreeTextEntry1] : \par HTN:\par -BP at goal today\par -she is on amlodipine 10mg PO daily and HCTZ 12.5mg PO daily-will refill\par -I advised low fat/low cholesterol diet, low salt diet, and weight loss\par \par B/L knee pain:\par -she is under the care of Orthopedics and she states plan is for her to get injections-I advised she make appt to see Orthopedcis\par -tylenol prn\par -I  again advised she start PT-referral given\par -weight loss advised\par \par SEEMA:\par -she has been referred to pulmonary-she has not made appt yet\par \par Anxiety/Depression:\par -she states she is again seeing therapist\par -she does not want meds\par \par Morbid Obesity/pre-DM:\par -BMI 45\par -HgA1c 6.0\par -risks of obesity discussed\par -benefits of weight loss discussed\par -low fat/low chol diet and low carbohydrate diet advised\par -small portion sizes encouraged\par -I advised avoidance of sugary drinks\par -weight loss advised\par -she was previously referred to Dr Mesa at [facility]\par \par Abnormal EKG\par -previous EKG showed NSR at 75 with poor R wave progression\par -I have advised she see cardiology again today-I gave her referral again today 2020\par -she denies chest pain, sob, palpitations\par \par Elevated TSH:\par -repeat TFTs were normal but her thyroid abs were positive\par -Will check TFTs\par \par Hypertriglyceridemia:\par -fasting labs ordered-she will have done at lab\par -she should adhere to low fat/low cholesterol diet, low carbohydrate diet and weight loss advised\par \par Constipation:\par -I advised she increase PO fluids\par -I have advised she increase fiber in diet\par -will give trial of miralax for prn use\par -she is to notify office if sx persist or worsen\par \par \par \par HCM:\par \par CPE: 2019\par \par Depression screenin2019 negative (PHQ 6 score 6)\par \par EK2019\par \par Flu shot: advised 10/31/2019-she declined\par \par Tdap:  10/31/2019\par \par Shingles vaccine: she will check records\par \par HIV testing: negative 10/2019\par \par Hepatitis C screening: negative 10/2019\par \par Colonoscopy: 2018--had one hyperplastic polyp and one tubular adenoma\par \par Mammogram: 2020 BR 2\par \par GYN/PAP: 2019- normal per pt\par \par DEXA: 3/2020 normal\par \par Covid 19 antibody test offered-she consented to testing\par \par \par F/U 3 months. Fasting labs ordered and she will have done at the lab\par

## 2020-06-11 ENCOUNTER — TRANSCRIPTION ENCOUNTER (OUTPATIENT)
Age: 60
End: 2020-06-11

## 2020-06-30 ENCOUNTER — TRANSCRIPTION ENCOUNTER (OUTPATIENT)
Age: 60
End: 2020-06-30

## 2020-08-05 ENCOUNTER — APPOINTMENT (OUTPATIENT)
Dept: INTERNAL MEDICINE | Facility: CLINIC | Age: 60
End: 2020-08-05

## 2020-10-07 ENCOUNTER — NON-APPOINTMENT (OUTPATIENT)
Age: 60
End: 2020-10-07

## 2020-10-07 ENCOUNTER — APPOINTMENT (OUTPATIENT)
Dept: INTERNAL MEDICINE | Facility: CLINIC | Age: 60
End: 2020-10-07
Payer: MEDICAID

## 2020-10-07 VITALS
TEMPERATURE: 98.4 F | HEART RATE: 70 BPM | BODY MASS INDEX: 46.82 KG/M2 | RESPIRATION RATE: 16 BRPM | OXYGEN SATURATION: 97 % | DIASTOLIC BLOOD PRESSURE: 90 MMHG | HEIGHT: 61 IN | SYSTOLIC BLOOD PRESSURE: 138 MMHG | WEIGHT: 248 LBS

## 2020-10-07 DIAGNOSIS — B35.4 TINEA CORPORIS: ICD-10-CM

## 2020-10-07 DIAGNOSIS — M25.562 PAIN IN RIGHT KNEE: ICD-10-CM

## 2020-10-07 DIAGNOSIS — M25.561 PAIN IN RIGHT KNEE: ICD-10-CM

## 2020-10-07 PROCEDURE — 93000 ELECTROCARDIOGRAM COMPLETE: CPT

## 2020-10-07 PROCEDURE — 36415 COLL VENOUS BLD VENIPUNCTURE: CPT

## 2020-10-07 PROCEDURE — 96127 BRIEF EMOTIONAL/BEHAV ASSMT: CPT

## 2020-10-07 PROCEDURE — 99215 OFFICE O/P EST HI 40 MIN: CPT | Mod: 25

## 2020-10-07 RX ORDER — HYDROCHLOROTHIAZIDE 12.5 MG/1
12.5 TABLET ORAL DAILY
Qty: 30 | Refills: 5 | Status: DISCONTINUED | COMMUNITY
Start: 2019-10-31 | End: 2020-10-07

## 2020-10-07 NOTE — HISTORY OF PRESENT ILLNESS
[FreeTextEntry8] : July is here today for evaluation of multiple complaints\par \par Appt made originally because she had reports transient leg weakness.  She reports she has been having chronic knee pains.  She states she also has low back pian.  She states sometimes she gets numbness in her B/L anterior thighs.  She denies any trauma or falls\par \par She states she has alos noted itchy skin lesion in her right temple rach x few days\par \par She states she has been feeling fatigued and depressed lateley.  She states she has been seeing her therapist regularly.  She does not want medication\par \par She reports that 2 days ago she "a milk pie" And yesterday developed diarrhea and abdominal cramping.  She states she had symptoms for about one day.  She states she is feeling a little better today.  She denies nausea or vomiting.  No fevers or chills reported\par \par She also reports that she has had chest congestion and mild dry cough for a couple of months.  She states symptoms started approximately in March 2020.  She denies fevers or chills.  She reports mild nasal congestion.  She denies wheezing, chest pain, shortness of breath.  she has not taken anything for her symptoms\par \par She reports that every since starting hydrochlorothiazide for her blood pressure she has to go to the bathroom and urinated very frequently.  She states she sometimes cannot get to bathroom in time.  She states this is bothersome for her.  She denies burning on urination\par \par At last visit I gave her order for fasting blood work but she did not get it done

## 2020-10-07 NOTE — PHYSICAL EXAM
[No Acute Distress] : no acute distress [Well-Appearing] : well-appearing [Normal Voice/Communication] : normal voice/communication [Normal Sclera/Conjunctiva] : normal sclera/conjunctiva [PERRL] : pupils equal round and reactive to light [Normal Oropharynx] : the oropharynx was normal [Normal] : normal rate, regular rhythm, normal S1 and S2 and no murmur heard [No Edema] : there was no peripheral edema [Soft] : abdomen soft [Non Tender] : non-tender [Non-distended] : non-distended [No Masses] : no abdominal mass palpated [No HSM] : no HSM [Normal Bowel Sounds] : normal bowel sounds [No Joint Swelling] : no joint swelling [No Focal Deficits] : no focal deficits [Normal Affect] : the affect was normal [Alert and Oriented x3] : oriented to person, place, and time [Normal Mood] : the mood was normal [Normal Insight/Judgement] : insight and judgment were intact [Normal Outer Ear/Nose] : the outer ears and nose were normal in appearance [No CVA Tenderness] : no CVA  tenderness [No Spinal Tenderness] : no spinal tenderness [Grossly Normal Strength/Tone] : grossly normal strength/tone [de-identified] : Obese [de-identified] : Nasal mucosa is edematous, turbinates are pale with mild amount of clear discharge [de-identified] : no calf tenderness [de-identified] : B/L knees with no swelling or tenderness [de-identified] : On the right side of forehead near temple she has a 1.5 cm x 1.5 cm light brown macular lesion with whitish scaling

## 2020-10-07 NOTE — ASSESSMENT
[FreeTextEntry1] : \par HTN:\par -BP not at goal today\par -she is on amlodipine 10mg PO daily and HCTZ 12.5mg PO daily\par -She reports bother her some urinary frequency with HCTZ\par -Will DC HCTZ\par -Will start losartan 25 mg daily  (R/B/A/side effects discussed)\par -I advised low fat/low cholesterol diet, low salt diet, and weight loss\par -Labs drawn today\par -Followup 4-6 weeks for blood pressure check\par \par B/L knee pain:\par -she is under the care of Orthopedics and she states plan is for her to get injections\par -I advised she make appt to see Orthopedics\par -tylenol prn\par -I  again advised she start PT-referral given again today\par -Will order bilateral knee x-rays\par -weight loss advised\par \par Low back pain/reported anterior thigh numbness:\par -will check labs\par -Will check x-ray of lumbar spine\par -Tylenol p.r.n.\par -Will refer to physical therapy\par -Will refer to neurology\par \par Chronic cough/allergic rhinitis:\par -will give trial of Flonase nasal spray for p.r.n. use\par -Will check chest x-ray\par \par SEEMA:\par -she has been referred to pulmonary-she has not made appt yet\par \par Anxiety/Depression:\par -she states she is again seeing therapist\par -she does not want meds\par -PHQ 2 score 0\par \par Morbid Obesity/pre-DM:\par -BMI 46\par -She has gained 6 pounds since last visit\par -HgA1c 6.0\par -risks of obesity discussed\par -benefits of weight loss discussed\par -low fat/low chol diet and low carbohydrate diet advised\par -small portion sizes encouraged\par -I advised avoidance of sugary drinks\par -weight loss advised\par -she was previously referred to Dr Mesa at [facility] but she did not make appointment\par \par Abnormal EKG\par - EKG today shows NSR at 75 with poor R-wave progression\par -I again advised that she see cardiology and referral given again today\par -she denies chest pain, sob, palpitations\par \par Elevated TSH:\par -repeat TFTs were normal but her thyroid abs were positive\par -Will check TFTs\par \par Hypertriglyceridemia:\par -Labs drawn today\par -she should adhere to low fat/low cholesterol diet, low carbohydrate diet and weight loss advised\par \par Constipation:\par - miralax for prn use\par -No complaints today\par \par Skin lesion right forehead:\par -Possibly tinea corporis\par -Will give clotrimazole 1% cream twice daily x2 weeks\par -If symptoms do not improve will refer to dermatology\par \par \par \par HCM:\par \par CPE: 4/5/2019\par \par Depression screening: 10/7/2020 PHQ 2 score 0\par \par EKG: 10/17/2020\par \par Flu shot: advised 10/7/2020-she declined\par \par Tdap:  10/31/2019\par \par Shingles vaccine: she will check records\par \par HIV testing: negative 10/2019\par \par Hepatitis C screening: negative 10/2019\par \par Colonoscopy: 2/2018--had one hyperplastic polyp and one tubular adenoma\par \par Mammogram: 2/2020 BR 2\par \par GYN/PAP: 7/2019- normal per pt\par \par DEXA: 3/2020 normal\par \par Covid 19 antibody test offered-she consented to testing\par \par \par F/U 4-6 weeks for blood pressure check.  Labs drawn in the office today.  She is to notify office if symptoms persist or worsen\par

## 2020-10-09 ENCOUNTER — OUTPATIENT (OUTPATIENT)
Dept: OUTPATIENT SERVICES | Facility: HOSPITAL | Age: 60
LOS: 1 days | End: 2020-10-09
Payer: MEDICAID

## 2020-10-09 ENCOUNTER — APPOINTMENT (OUTPATIENT)
Dept: RADIOLOGY | Facility: CLINIC | Age: 60
End: 2020-10-09
Payer: MEDICAID

## 2020-10-09 DIAGNOSIS — M54.5 LOW BACK PAIN: ICD-10-CM

## 2020-10-09 DIAGNOSIS — Z98.89 OTHER SPECIFIED POSTPROCEDURAL STATES: Chronic | ICD-10-CM

## 2020-10-09 PROCEDURE — 72100 X-RAY EXAM L-S SPINE 2/3 VWS: CPT

## 2020-10-09 PROCEDURE — 72100 X-RAY EXAM L-S SPINE 2/3 VWS: CPT | Mod: 26

## 2020-10-09 PROCEDURE — 71046 X-RAY EXAM CHEST 2 VIEWS: CPT | Mod: 26

## 2020-10-09 PROCEDURE — 71046 X-RAY EXAM CHEST 2 VIEWS: CPT

## 2020-10-11 LAB
25(OH)D3 SERPL-MCNC: 26.2 NG/ML
ALBUMIN SERPL ELPH-MCNC: 4.3 G/DL
ALP BLD-CCNC: 120 U/L
ALT SERPL-CCNC: 27 U/L
ANION GAP SERPL CALC-SCNC: 12 MMOL/L
APPEARANCE: ABNORMAL
AST SERPL-CCNC: 27 U/L
BACTERIA: NEGATIVE
BASOPHILS # BLD AUTO: 0.04 K/UL
BASOPHILS NFR BLD AUTO: 0.6 %
BILIRUB SERPL-MCNC: 0.5 MG/DL
BILIRUBIN URINE: NEGATIVE
BLOOD URINE: NEGATIVE
BUN SERPL-MCNC: 10 MG/DL
CALCIUM OXALATE CRYSTALS: ABNORMAL
CALCIUM SERPL-MCNC: 9.7 MG/DL
CHLORIDE SERPL-SCNC: 102 MMOL/L
CHOLEST SERPL-MCNC: 176 MG/DL
CHOLEST/HDLC SERPL: 2.7 RATIO
CO2 SERPL-SCNC: 27 MMOL/L
COLOR: YELLOW
CREAT SERPL-MCNC: 0.72 MG/DL
EOSINOPHIL # BLD AUTO: 0.17 K/UL
EOSINOPHIL NFR BLD AUTO: 2.4 %
ERYTHROCYTE [SEDIMENTATION RATE] IN BLOOD BY WESTERGREN METHOD: 35 MM/HR
ESTIMATED AVERAGE GLUCOSE: 131 MG/DL
GLUCOSE QUALITATIVE U: NEGATIVE
GLUCOSE SERPL-MCNC: 121 MG/DL
HBA1C MFR BLD HPLC: 6.2 %
HCT VFR BLD CALC: 45 %
HDLC SERPL-MCNC: 66 MG/DL
HGB BLD-MCNC: 13.9 G/DL
HYALINE CASTS: 0 /LPF
IMM GRANULOCYTES NFR BLD AUTO: 0.3 %
KETONES URINE: NEGATIVE
LDLC SERPL CALC-MCNC: 87 MG/DL
LEUKOCYTE ESTERASE URINE: NEGATIVE
LYMPHOCYTES # BLD AUTO: 2.26 K/UL
LYMPHOCYTES NFR BLD AUTO: 32 %
MAN DIFF?: NORMAL
MCHC RBC-ENTMCNC: 28.3 PG
MCHC RBC-ENTMCNC: 30.9 GM/DL
MCV RBC AUTO: 91.5 FL
MICROSCOPIC-UA: NORMAL
MONOCYTES # BLD AUTO: 0.39 K/UL
MONOCYTES NFR BLD AUTO: 5.5 %
NEUTROPHILS # BLD AUTO: 4.19 K/UL
NEUTROPHILS NFR BLD AUTO: 59.2 %
NITRITE URINE: NEGATIVE
PH URINE: 6
PLATELET # BLD AUTO: 263 K/UL
POTASSIUM SERPL-SCNC: 3.8 MMOL/L
PROT SERPL-MCNC: 7.2 G/DL
PROTEIN URINE: ABNORMAL
RBC # BLD: 4.92 M/UL
RBC # FLD: 13.9 %
RED BLOOD CELLS URINE: 3 /HPF
SARS-COV-2 IGG SERPL IA-ACNC: 0.1 INDEX
SARS-COV-2 IGG SERPL QL IA: NEGATIVE
SODIUM SERPL-SCNC: 140 MMOL/L
SPECIFIC GRAVITY URINE: 1.02
SQUAMOUS EPITHELIAL CELLS: 18 /HPF
T4 FREE SERPL-MCNC: 1.1 NG/DL
TRIGL SERPL-MCNC: 119 MG/DL
TSH SERPL-ACNC: 3.37 UIU/ML
UROBILINOGEN URINE: NORMAL
VIT B12 SERPL-MCNC: <150 PG/ML
WBC # FLD AUTO: 7.07 K/UL
WHITE BLOOD CELLS URINE: 4 /HPF

## 2020-11-06 ENCOUNTER — APPOINTMENT (OUTPATIENT)
Dept: CT IMAGING | Facility: CLINIC | Age: 60
End: 2020-11-06
Payer: MEDICAID

## 2020-11-06 ENCOUNTER — OUTPATIENT (OUTPATIENT)
Dept: OUTPATIENT SERVICES | Facility: HOSPITAL | Age: 60
LOS: 1 days | End: 2020-11-06
Payer: MEDICAID

## 2020-11-06 DIAGNOSIS — R05 COUGH: ICD-10-CM

## 2020-11-06 DIAGNOSIS — R91.8 OTHER NONSPECIFIC ABNORMAL FINDING OF LUNG FIELD: ICD-10-CM

## 2020-11-06 DIAGNOSIS — Z98.89 OTHER SPECIFIED POSTPROCEDURAL STATES: Chronic | ICD-10-CM

## 2020-11-06 PROCEDURE — 71250 CT THORAX DX C-: CPT | Mod: 26

## 2020-11-06 PROCEDURE — 71250 CT THORAX DX C-: CPT

## 2020-11-07 ENCOUNTER — APPOINTMENT (OUTPATIENT)
Dept: CT IMAGING | Facility: CLINIC | Age: 60
End: 2020-11-07

## 2020-11-11 ENCOUNTER — APPOINTMENT (OUTPATIENT)
Dept: INTERNAL MEDICINE | Facility: CLINIC | Age: 60
End: 2020-11-11
Payer: MEDICAID

## 2020-11-11 VITALS
HEIGHT: 61 IN | WEIGHT: 241 LBS | BODY MASS INDEX: 45.5 KG/M2 | TEMPERATURE: 98.7 F | DIASTOLIC BLOOD PRESSURE: 78 MMHG | SYSTOLIC BLOOD PRESSURE: 126 MMHG | RESPIRATION RATE: 16 BRPM | OXYGEN SATURATION: 96 % | HEART RATE: 70 BPM

## 2020-11-11 DIAGNOSIS — J30.9 ALLERGIC RHINITIS, UNSPECIFIED: ICD-10-CM

## 2020-11-11 DIAGNOSIS — N28.9 DISORDER OF KIDNEY AND URETER, UNSPECIFIED: ICD-10-CM

## 2020-11-11 PROCEDURE — 99215 OFFICE O/P EST HI 40 MIN: CPT | Mod: 25

## 2020-11-11 PROCEDURE — 36415 COLL VENOUS BLD VENIPUNCTURE: CPT

## 2020-11-11 PROCEDURE — 99072 ADDL SUPL MATRL&STAF TM PHE: CPT

## 2020-11-12 ENCOUNTER — OUTPATIENT (OUTPATIENT)
Dept: OUTPATIENT SERVICES | Facility: HOSPITAL | Age: 60
LOS: 1 days | End: 2020-11-12
Payer: MEDICAID

## 2020-11-12 ENCOUNTER — APPOINTMENT (OUTPATIENT)
Dept: ULTRASOUND IMAGING | Facility: CLINIC | Age: 60
End: 2020-11-12
Payer: MEDICAID

## 2020-11-12 DIAGNOSIS — Z98.89 OTHER SPECIFIED POSTPROCEDURAL STATES: Chronic | ICD-10-CM

## 2020-11-12 DIAGNOSIS — N28.9 DISORDER OF KIDNEY AND URETER, UNSPECIFIED: ICD-10-CM

## 2020-11-12 LAB
ANION GAP SERPL CALC-SCNC: 14 MMOL/L
BUN SERPL-MCNC: 13 MG/DL
CALCIUM SERPL-MCNC: 9.8 MG/DL
CHLORIDE SERPL-SCNC: 103 MMOL/L
CO2 SERPL-SCNC: 23 MMOL/L
CREAT SERPL-MCNC: 0.7 MG/DL
CREAT SPEC-SCNC: 288 MG/DL
CREAT/PROT UR: 0.1 RATIO
GLUCOSE SERPL-MCNC: 120 MG/DL
LDH SERPL-CCNC: 178 U/L
POTASSIUM SERPL-SCNC: 4.2 MMOL/L
PROT UR-MCNC: 18 MG/DL
SODIUM SERPL-SCNC: 141 MMOL/L
VIT B12 SERPL-MCNC: 381 PG/ML

## 2020-11-12 PROCEDURE — 76775 US EXAM ABDO BACK WALL LIM: CPT

## 2020-11-12 PROCEDURE — 76775 US EXAM ABDO BACK WALL LIM: CPT | Mod: 26

## 2020-11-13 LAB
M TB IFN-G BLD-IMP: NEGATIVE
QUANTIFERON TB PLUS MITOGEN MINUS NIL: 6.04 IU/ML
QUANTIFERON TB PLUS NIL: 0.01 IU/ML
QUANTIFERON TB PLUS TB1 MINUS NIL: 0 IU/ML
QUANTIFERON TB PLUS TB2 MINUS NIL: 0 IU/ML

## 2020-11-17 LAB — METHYLMALONATE SERPL-SCNC: 389 NMOL/L

## 2020-11-17 NOTE — REVIEW OF SYSTEMS
[Negative] : Neurological [Fever] : no fever [Chills] : no chills [Fatigue] : no fatigue [Chest Pain] : no chest pain [Palpitations] : no palpitations [Lower Ext Edema] : no lower extremity edema [Shortness Of Breath] : no shortness of breath [Wheezing] : no wheezing [Cough] : no cough [Dyspnea on Exertion] : no dyspnea on exertion [Abdominal Pain] : no abdominal pain [Nausea] : no nausea [Diarrhea] : diarrhea [Vomiting] : no vomiting [FreeTextEntry2] : lost 7 lbs

## 2020-11-17 NOTE — ASSESSMENT
[FreeTextEntry1] : \par HTN:\par -BP nbetter today amlodipine 10mg PO daily, HCTZ 12.5mg PO daily and losartan 25 mg daily  \par -I advised low fat/low cholesterol diet, low salt diet, and weight loss\par \par B/L knee pain:\par -she is under the care of Orthopedics and she states plan is for her to get injections\par -I advised she make appt to see Orthopedics\par -tylenol prn\par -she states she started PT\par -she did not do xrays because she states she already had them done with ortho\par \par Low back pain/reported anterior thigh numbness:\par -x-ray of lumbar spine revealed OA and narrowed disc spaces\par -Tylenol p.r.n.\par -she states she has started physical therapy\par -she has been referred to neurology\par \par Chronic cough/allergic rhinitis:\par -Flonase nasal spray for p.r.n. use\par \par Lung mass/nodule:\par -CXR revealed hilar mass\par -CT chest revealed 1.7 x 1.5 lingular nodule possible hamartoma\par -I have advised she see Thoracic surgery and referred her to Dr Pa\par \par Her daughter reported that she has hx of + PPS but she could nt recall\par -will check Quantiferon TB test\par \par Renal lesion on left:\par -seen on CT chest\par -will order renal US\par \par proteinuria:\par -will check P:C\par \par SEEMA:\par -she has been referred to pulmonary-she has not made appt yet\par \par Anxiety/Depression:\par -she states she is again seeing therapist\par -she does not want meds\par \par Morbid Obesity/pre-DM:\par -she has lost 7 lbs with diet modification\par -HgA1c 6.2 10.2020\par -low fat/low chol diet and low carbohydrate diet advised\par -small portion sizes encouraged\par -I advised avoidance of sugary drinks\par -weight loss advised\par -she requests referral to see Nutritionist-will refer\par \par Abnormal EKG\par -I again advised that she see cardiology and referral given again today\par -she denies chest pain, sob, palpitations\par \par Elevated TSH:\par -repeat TFTs were normal but her thyroid abs were positive\par - TFTs normal 10/2020\par \par Hypertriglyceridemia:\par -lipids at goal 10/2020\par -she should adhere to low fat/low cholesterol diet, low carbohydrate diet and weight loss advised\par \par Constipation:\par - miralax for prn use\par \par Skin lesion right forehead:\par -has resolved\par \par Vitamin D insufficiency:\par -I advised OTC vitamin D 3 1000 units daily\par \par Vitamin b12 deficiency:\par -she is now taking vitamin b12 1000mcg PO daily\par -will check vitamin b12 and MMA\par \par \par \par HCM:\par \par CPE: 4/5/2019\par \par Depression screening: 10/7/2020 PHQ 2 score 0\par \par EKG: 10/17/2020\par \par Flu shot: advised 10/7/2020-she declined\par \par Tdap:  10/31/2019\par \par Shingles vaccine: she will check records\par \par HIV testing: negative 10/2019\par \par Hepatitis C screening: negative 10/2019\par \par Colonoscopy: 2/2018--had one hyperplastic polyp and one tubular adenoma\par \par Mammogram: 2/2020 BR 2\par \par GYN/PAP: 7/2019- normal per pt-will discuss at next visit\par \par DEXA: 3/2020 normal\par \par Covid 19 antibody test: 10/2020\par \par \par F/U 6-8 weeks

## 2020-11-17 NOTE — PHYSICAL EXAM
[No Acute Distress] : no acute distress [Well-Appearing] : well-appearing [Normal Voice/Communication] : normal voice/communication [Normal Sclera/Conjunctiva] : normal sclera/conjunctiva [PERRL] : pupils equal round and reactive to light [Normal Oropharynx] : the oropharynx was normal [Normal] : normal rate, regular rhythm, normal S1 and S2 and no murmur heard [No Edema] : there was no peripheral edema [No Focal Deficits] : no focal deficits [Normal Affect] : the affect was normal [Alert and Oriented x3] : oriented to person, place, and time [Normal Mood] : the mood was normal [Normal Insight/Judgement] : insight and judgment were intact [No Rash] : no rash [No Skin Lesions] : no skin lesions [de-identified] : Obese [de-identified] : no calf tenderness

## 2020-11-17 NOTE — HISTORY OF PRESENT ILLNESS
[de-identified] : Here for follow up\par \par No new complaints\par \par She need amlodipine refilled

## 2020-11-20 ENCOUNTER — APPOINTMENT (OUTPATIENT)
Dept: NEUROLOGY | Facility: CLINIC | Age: 60
End: 2020-11-20
Payer: MEDICAID

## 2020-11-20 VITALS — WEIGHT: 241 LBS | HEIGHT: 61 IN | BODY MASS INDEX: 45.5 KG/M2 | TEMPERATURE: 97.8 F

## 2020-11-20 DIAGNOSIS — G62.9 POLYNEUROPATHY, UNSPECIFIED: ICD-10-CM

## 2020-11-20 DIAGNOSIS — R20.0 ANESTHESIA OF SKIN: ICD-10-CM

## 2020-11-20 PROCEDURE — 99204 OFFICE O/P NEW MOD 45 MIN: CPT

## 2020-11-23 ENCOUNTER — NON-APPOINTMENT (OUTPATIENT)
Age: 60
End: 2020-11-23

## 2020-11-23 ENCOUNTER — APPOINTMENT (OUTPATIENT)
Dept: THORACIC SURGERY | Facility: CLINIC | Age: 60
End: 2020-11-23
Payer: MEDICAID

## 2020-11-23 VITALS
HEART RATE: 75 BPM | OXYGEN SATURATION: 95 % | HEIGHT: 61 IN | DIASTOLIC BLOOD PRESSURE: 72 MMHG | BODY MASS INDEX: 45.5 KG/M2 | WEIGHT: 241 LBS | TEMPERATURE: 98.3 F | SYSTOLIC BLOOD PRESSURE: 112 MMHG | RESPIRATION RATE: 15 BRPM

## 2020-11-23 PROCEDURE — 99204 OFFICE O/P NEW MOD 45 MIN: CPT

## 2020-11-23 NOTE — HISTORY OF PRESENT ILLNESS
[FreeTextEntry1] : \vianey Mcdowell was in the office today for an initial visit. She was recently found to have a left lingular nodule that is well circumscribed and has a generally benign appearance. She describes an intermittent cough, which is occasionally productive that has been present for some time as well. She denies any significant shortness of breath with exertion.

## 2020-11-23 NOTE — ASSESSMENT
[FreeTextEntry1] : July is a 60-year-old female with a well-circumscribed nodule that may contain fat in the lingula segment of the left upper lobe. This may represent a benign hamartoma, but I do believe a PET scan is appropriate at this time to ensure no activity is present. I suspect continued observation will be necessary going forward.\par \par Thank you for allowing me to participate in the care of your patient.\par \par Isaiah Pa MD\[facility] Department of Cardiovascular and Thoracic Surgery\par \vianey Herrera and Nano Ruth\[facility] [employer] [facility]\par

## 2020-12-03 ENCOUNTER — OUTPATIENT (OUTPATIENT)
Dept: OUTPATIENT SERVICES | Facility: HOSPITAL | Age: 60
LOS: 1 days | End: 2020-12-03
Payer: MEDICAID

## 2020-12-03 ENCOUNTER — APPOINTMENT (OUTPATIENT)
Dept: NUCLEAR MEDICINE | Facility: CLINIC | Age: 60
End: 2020-12-03

## 2020-12-03 ENCOUNTER — RESULT REVIEW (OUTPATIENT)
Age: 60
End: 2020-12-03

## 2020-12-03 DIAGNOSIS — Z98.89 OTHER SPECIFIED POSTPROCEDURAL STATES: Chronic | ICD-10-CM

## 2020-12-03 DIAGNOSIS — Z00.00 ENCOUNTER FOR GENERAL ADULT MEDICAL EXAMINATION WITHOUT ABNORMAL FINDINGS: ICD-10-CM

## 2020-12-03 PROCEDURE — 78815 PET IMAGE W/CT SKULL-THIGH: CPT | Mod: 26,PI

## 2020-12-30 ENCOUNTER — APPOINTMENT (OUTPATIENT)
Dept: INTERNAL MEDICINE | Facility: CLINIC | Age: 60
End: 2020-12-30

## 2021-01-04 ENCOUNTER — APPOINTMENT (OUTPATIENT)
Dept: THORACIC SURGERY | Facility: CLINIC | Age: 61
End: 2021-01-04

## 2021-01-26 ENCOUNTER — APPOINTMENT (OUTPATIENT)
Dept: INTERNAL MEDICINE | Facility: CLINIC | Age: 61
End: 2021-01-26

## 2021-02-24 ENCOUNTER — APPOINTMENT (OUTPATIENT)
Dept: DERMATOLOGY | Facility: CLINIC | Age: 61
End: 2021-02-24

## 2021-03-24 ENCOUNTER — NON-APPOINTMENT (OUTPATIENT)
Age: 61
End: 2021-03-24

## 2021-03-25 ENCOUNTER — APPOINTMENT (OUTPATIENT)
Dept: INTERNAL MEDICINE | Facility: CLINIC | Age: 61
End: 2021-03-25

## 2021-04-01 ENCOUNTER — OUTPATIENT (OUTPATIENT)
Dept: OUTPATIENT SERVICES | Facility: HOSPITAL | Age: 61
LOS: 1 days | End: 2021-04-01
Payer: MEDICAID

## 2021-04-01 DIAGNOSIS — Z98.89 OTHER SPECIFIED POSTPROCEDURAL STATES: Chronic | ICD-10-CM

## 2021-04-15 ENCOUNTER — NON-APPOINTMENT (OUTPATIENT)
Age: 61
End: 2021-04-15

## 2021-04-15 ENCOUNTER — APPOINTMENT (OUTPATIENT)
Dept: INTERNAL MEDICINE | Facility: CLINIC | Age: 61
End: 2021-04-15
Payer: MEDICAID

## 2021-04-15 VITALS
BODY MASS INDEX: 42.67 KG/M2 | HEART RATE: 79 BPM | RESPIRATION RATE: 15 BRPM | HEIGHT: 61 IN | WEIGHT: 226 LBS | OXYGEN SATURATION: 97 % | SYSTOLIC BLOOD PRESSURE: 120 MMHG | TEMPERATURE: 98.7 F | DIASTOLIC BLOOD PRESSURE: 76 MMHG

## 2021-04-15 PROCEDURE — 96127 BRIEF EMOTIONAL/BEHAV ASSMT: CPT

## 2021-04-15 PROCEDURE — 99072 ADDL SUPL MATRL&STAF TM PHE: CPT

## 2021-04-15 PROCEDURE — 99215 OFFICE O/P EST HI 40 MIN: CPT | Mod: 25

## 2021-04-15 PROCEDURE — 93000 ELECTROCARDIOGRAM COMPLETE: CPT

## 2021-04-15 RX ORDER — DICLOFENAC SODIUM 10 MG/G
1 GEL TOPICAL
Qty: 1 | Refills: 3 | Status: DISCONTINUED | COMMUNITY
Start: 2019-04-05 | End: 2021-04-15

## 2021-04-15 RX ORDER — CLOTRIMAZOLE 10 MG/G
1 CREAM TOPICAL
Qty: 1 | Refills: 0 | Status: DISCONTINUED | COMMUNITY
Start: 2020-10-07 | End: 2021-04-15

## 2021-04-15 RX ORDER — FLUTICASONE PROPIONATE 50 UG/1
50 SPRAY, METERED NASAL DAILY
Qty: 1 | Refills: 2 | Status: DISCONTINUED | COMMUNITY
Start: 2020-10-07 | End: 2021-04-15

## 2021-04-15 RX ORDER — LOSARTAN POTASSIUM 25 MG/1
25 TABLET, FILM COATED ORAL DAILY
Qty: 90 | Refills: 0 | Status: DISCONTINUED | COMMUNITY
Start: 2020-10-07 | End: 2021-04-15

## 2021-04-15 RX ORDER — SILVER SULFADIAZINE 10 MG/G
1 CREAM TOPICAL TWICE DAILY
Qty: 1 | Refills: 0 | Status: DISCONTINUED | COMMUNITY
Start: 2019-10-31 | End: 2021-04-15

## 2021-04-15 RX ORDER — POLYETHYLENE GLYCOL 3350 17 G/17G
17 POWDER, FOR SOLUTION ORAL
Qty: 1 | Refills: 0 | Status: DISCONTINUED | COMMUNITY
Start: 2020-05-26 | End: 2021-04-15

## 2021-04-15 RX ORDER — FLUTICASONE PROPIONATE 50 UG/1
50 SPRAY, METERED NASAL DAILY
Qty: 1 | Refills: 2 | Status: DISCONTINUED | COMMUNITY
Start: 2019-12-05 | End: 2021-04-15

## 2021-04-18 LAB
25(OH)D3 SERPL-MCNC: 23.5 NG/ML
ALBUMIN SERPL ELPH-MCNC: 4.2 G/DL
ALP BLD-CCNC: 124 U/L
ALT SERPL-CCNC: 24 U/L
ANION GAP SERPL CALC-SCNC: 13 MMOL/L
APPEARANCE: ABNORMAL
AST SERPL-CCNC: 26 U/L
BACTERIA: ABNORMAL
BASOPHILS # BLD AUTO: 0.05 K/UL
BASOPHILS NFR BLD AUTO: 0.6 %
BILIRUB SERPL-MCNC: 0.5 MG/DL
BILIRUBIN URINE: NEGATIVE
BLOOD URINE: NEGATIVE
BUN SERPL-MCNC: 10 MG/DL
CALCIUM SERPL-MCNC: 6.8 MG/DL
CHLORIDE SERPL-SCNC: 101 MMOL/L
CHOLEST SERPL-MCNC: 187 MG/DL
CO2 SERPL-SCNC: 25 MMOL/L
COLOR: YELLOW
CREAT SERPL-MCNC: 0.65 MG/DL
EOSINOPHIL # BLD AUTO: 0.2 K/UL
EOSINOPHIL NFR BLD AUTO: 2.5 %
ERYTHROCYTE [SEDIMENTATION RATE] IN BLOOD BY WESTERGREN METHOD: 35 MM/HR
ESTIMATED AVERAGE GLUCOSE: 117 MG/DL
GLUCOSE QUALITATIVE U: NEGATIVE
GLUCOSE SERPL-MCNC: 101 MG/DL
HBA1C MFR BLD HPLC: 5.7 %
HCT VFR BLD CALC: 41.9 %
HDLC SERPL-MCNC: 74 MG/DL
HGB BLD-MCNC: 13.4 G/DL
HYALINE CASTS: 1 /LPF
IMM GRANULOCYTES NFR BLD AUTO: 0.4 %
KETONES URINE: NEGATIVE
LDLC SERPL CALC-MCNC: 93 MG/DL
LEUKOCYTE ESTERASE URINE: NEGATIVE
LYMPHOCYTES # BLD AUTO: 2.59 K/UL
LYMPHOCYTES NFR BLD AUTO: 31.8 %
MAN DIFF?: NORMAL
MCHC RBC-ENTMCNC: 28.4 PG
MCHC RBC-ENTMCNC: 32 GM/DL
MCV RBC AUTO: 88.8 FL
MICROSCOPIC-UA: NORMAL
MONOCYTES # BLD AUTO: 0.37 K/UL
MONOCYTES NFR BLD AUTO: 4.5 %
NEUTROPHILS # BLD AUTO: 4.9 K/UL
NEUTROPHILS NFR BLD AUTO: 60.2 %
NITRITE URINE: NEGATIVE
NONHDLC SERPL-MCNC: 113 MG/DL
PH URINE: 6
PLATELET # BLD AUTO: 247 K/UL
POTASSIUM SERPL-SCNC: 4.2 MMOL/L
PROT SERPL-MCNC: 7.1 G/DL
PROTEIN URINE: NORMAL
RBC # BLD: 4.72 M/UL
RBC # FLD: 13.2 %
RED BLOOD CELLS URINE: 5 /HPF
SARS-COV-2 N GENE NPH QL NAA+PROBE: NOT DETECTED
SODIUM SERPL-SCNC: 139 MMOL/L
SPECIFIC GRAVITY URINE: 1.02
SQUAMOUS EPITHELIAL CELLS: 15 /HPF
T4 FREE SERPL-MCNC: 1.2 NG/DL
TRIGL SERPL-MCNC: 102 MG/DL
TSH SERPL-ACNC: 3.19 UIU/ML
UROBILINOGEN URINE: NORMAL
VIT B12 SERPL-MCNC: 216 PG/ML
WBC # FLD AUTO: 8.14 K/UL
WHITE BLOOD CELLS URINE: 4 /HPF

## 2021-04-18 NOTE — REVIEW OF SYSTEMS
[Fever] : no fever [Chills] : no chills [Fatigue] : no fatigue [Chest Pain] : no chest pain [Palpitations] : no palpitations [Lower Ext Edema] : no lower extremity edema [Shortness Of Breath] : no shortness of breath [Wheezing] : no wheezing [Cough] : no cough [Dyspnea on Exertion] : no dyspnea on exertion [Abdominal Pain] : no abdominal pain [Nausea] : no nausea [Diarrhea] : diarrhea [Vomiting] : no vomiting [Suicidal] : not suicidal [Anxiety] : no anxiety [Depression] : no depression [Negative] : Integumentary [de-identified] : See HPI

## 2021-04-18 NOTE — HISTORY OF PRESENT ILLNESS
[Post-hospitalization from ___ Hospital] : Post-hospitalization from [unfilled] Hospital [Discharge Med List] : discharge medication list [FreeTextEntry2] : Here today for follow-up status post psychiatric hospital admission at [facility] from 4/1/2021 through 4/9/2021.  She was admitted to hospital with depression and psychosis.  Appears she felt that the people who live downstairs were bringing too many people home at night and she stated they were blowing up smoke or smelly substance to the events.  She states her family did not believe her.  She states she was started on medication and overall feels better.  She states she plans on going to [place] this weekend to stay with her son for a week or two.  She states an appointment was made for her to see therapist/psychiatrist but states the person they assigned her spoke only English and she wants [place]-speaking therapist/psychiatrist so we are awaiting callback with [place]-speaking provider.\par \par She brought in a hand written note from her son stating that her B12 level had been low in the hospital.  Appears she is currently taking oral vitamin B12 supplement.\par \par Her son wants her to have her hemoglobin A1c checked as she has a history of prediabetes asked to make sure that she is not diabetic.\par \par He also wrote a note that recent brain MRI was normal but she may have basal ganglia calcification.  I do not have copy of MRI report at this time\par \par She states she does occasionally get dizziness and headaches from time to time.  She denies chest pain, shortness of breath, palpitations, lower extremity edema.

## 2021-04-18 NOTE — ASSESSMENT
[FreeTextEntry1] : \par Depression/psychosis:\par -She is in process of finding Romanian-speaking therapist/psychiatrist\par -She was discharged on Lexapro 10 mg daily and perphenazine 4 mg twice daily\par -PHQ-9 score 3\par -She is to notify office if symptoms persist or worsen\par -Check labs\par \par Vitamin B12 deficiency:\par -I recommended vitamin B12 shot today but she declined.  She states she does not like injections\par -She states she is currently taking vitamin B complex vitamin\par -I will check vitamin B12 level and MMA today but if level remains low she may need monthly injections\par \par HTN:\par -BP at goal today on amlodipine 10mg PO daily\par -Appears hydrochlorothiazide and losartan were DC'd in hospital\par -I advised low fat/low cholesterol diet, low salt diet, and weight loss\par \par B/L knee pain:\par -No complaints reported today\par -She is followed by orthopedics\par \par Low back pain/reported anterior thigh numbness:\par -No complaints today\par -She was seen by neurologist and diagnosed with meralgia paresthetica\par \par Lung mass/nodule:\par -CXR revealed hilar mass\par -CT chest revealed 1.7 x 1.5 lingular nodule possible hamartoma\par -She was seen by thoracic sx- to Dr Pa who ordered a PET CT which showed lung mass/nodule with minimal FGD activity which was indeterminate\par -I have advised that she schedule follow-up appointment with Dr. Pa\par \par Renal lesion on left:-seen on CT chest\par -She had renal ultrasound which showed 3.8 cm left renal cyst, otherwise normal renal sonogram\par \par SEEMA:\par -she has been referred to pulmonary previously\par \par Morbid Obesity/pre-DM:\par -low fat/low chol diet and low carbohydrate diet advised\par -Check labs\par \par Abnormal EKG\par -I again advised that she see cardiology and referral given again today\par -she denies chest pain, sob, palpitations\par \par Mild dizziness and headaches:\par -No symptoms reported today\par -Check labs\par -EKG today sinus bradycardia at 57, poor R wave progression, no acute ST abnormalities\par \par Hypertriglyceridemia:\par -Check labs\par \par Constipation:\par - miralax for prn use\par \par Vitamin D insufficiency:\par -Check labs\par \par Basal ganglia calcification:\par -will try to obtain MRI of brain results from [facility]\par \par HCM:\par \par CPE: 2019\par \par Depression screenin/15/2021 PHQ 9 score 3\par \par EKG: 10/17/2020\par \par Flu shot: advised 10/7/2020-she declined\par \par Tdap:  10/31/2019\par \par Shingles vaccine: she will check records\par \par Covid vaccine ([employer]) 2021\par \par HIV testing: negative 10/2019\par \par Hepatitis C screening: negative 10/2019\par \par Colonoscopy: 2018--had one hyperplastic polyp and one tubular adenoma\par \par Mammogram: 2020 BR 2-will discuss at next visit\par \par GYN/PAP: 2019- normal per pt-will discuss at next visit\par \par DEXA: 3/2020 normal\par \par QuantiFERON-TB test: 2020 -\par \par F/U 6-8 weeks.  She states that she is traveling to [place] she was told she needed a Covid PCR test.  I did explain to her that I did not think she needed a Covid PCR test given that she is traveling domestically and she has been vaccinated.  I ordered Covid PCR test at her request and this was collected in the office today

## 2021-04-18 NOTE — PHYSICAL EXAM
[Well-Appearing] : well-appearing [Normal Voice/Communication] : normal voice/communication [Normal Sclera/Conjunctiva] : normal sclera/conjunctiva [PERRL] : pupils equal round and reactive to light [Normal Oropharynx] : the oropharynx was normal [Normal] : normal rate, regular rhythm, normal S1 and S2 and no murmur heard [No Edema] : there was no peripheral edema [No Rash] : no rash [No Focal Deficits] : no focal deficits [Normal Affect] : the affect was normal [Alert and Oriented x3] : oriented to person, place, and time [Normal Mood] : the mood was normal [Normal Insight/Judgement] : insight and judgment were intact [No Acute Distress] : no acute distress [Soft] : abdomen soft [Non-distended] : non-distended [No Masses] : no abdominal mass palpated [No HSM] : no HSM [Normal Bowel Sounds] : normal bowel sounds [No Spinal Tenderness] : no spinal tenderness [de-identified] : no calf tenderness [de-identified] : Obese

## 2021-04-23 DIAGNOSIS — Z71.89 OTHER SPECIFIED COUNSELING: ICD-10-CM

## 2021-04-23 LAB — METHYLMALONATE SERPL-SCNC: 410 NMOL/L

## 2021-05-20 ENCOUNTER — FORM ENCOUNTER (OUTPATIENT)
Age: 61
End: 2021-05-20

## 2021-06-15 ENCOUNTER — APPOINTMENT (OUTPATIENT)
Dept: INTERNAL MEDICINE | Facility: CLINIC | Age: 61
End: 2021-06-15
Payer: MEDICAID

## 2021-06-15 VITALS
HEIGHT: 60 IN | DIASTOLIC BLOOD PRESSURE: 80 MMHG | SYSTOLIC BLOOD PRESSURE: 120 MMHG | WEIGHT: 236 LBS | OXYGEN SATURATION: 98 % | TEMPERATURE: 97.8 F | RESPIRATION RATE: 16 BRPM | BODY MASS INDEX: 46.33 KG/M2 | HEART RATE: 67 BPM

## 2021-06-15 DIAGNOSIS — E83.51 HYPOCALCEMIA: ICD-10-CM

## 2021-06-15 DIAGNOSIS — R74.8 ABNORMAL LEVELS OF OTHER SERUM ENZYMES: ICD-10-CM

## 2021-06-15 DIAGNOSIS — N61.1 ABSCESS OF THE BREAST AND NIPPLE: ICD-10-CM

## 2021-06-15 DIAGNOSIS — R31.29 OTHER MICROSCOPIC HEMATURIA: ICD-10-CM

## 2021-06-15 PROCEDURE — 96372 THER/PROPH/DIAG INJ SC/IM: CPT

## 2021-06-15 PROCEDURE — 99214 OFFICE O/P EST MOD 30 MIN: CPT | Mod: 25

## 2021-06-15 RX ORDER — CYANOCOBALAMIN 1000 UG/ML
1000 INJECTION INTRAMUSCULAR; SUBCUTANEOUS
Qty: 0 | Refills: 0 | Status: COMPLETED | OUTPATIENT
Start: 2021-06-15

## 2021-06-15 RX ADMIN — CYANOCOBALAMIN 0 MCG/ML: 1000 INJECTION, SOLUTION INTRAMUSCULAR at 00:00

## 2021-06-20 LAB
ALBUMIN SERPL ELPH-MCNC: 4.5 G/DL
ALP BLD-CCNC: 127 U/L
ALT SERPL-CCNC: 21 U/L
ANION GAP SERPL CALC-SCNC: 13 MMOL/L
APPEARANCE: ABNORMAL
AST SERPL-CCNC: 25 U/L
BACTERIA UR CULT: NORMAL
BACTERIA: ABNORMAL
BILIRUB DIRECT SERPL-MCNC: 0.1 MG/DL
BILIRUB INDIRECT SERPL-MCNC: 0.2 MG/DL
BILIRUB SERPL-MCNC: 0.4 MG/DL
BILIRUBIN URINE: NEGATIVE
BLOOD URINE: NEGATIVE
BUN SERPL-MCNC: 14 MG/DL
CALCIUM SERPL-MCNC: 9.5 MG/DL
CALCIUM SERPL-MCNC: 9.5 MG/DL
CHLORIDE SERPL-SCNC: 102 MMOL/L
CO2 SERPL-SCNC: 25 MMOL/L
COLOR: YELLOW
CREAT SERPL-MCNC: 0.63 MG/DL
GGT SERPL-CCNC: 20 U/L
GLUCOSE QUALITATIVE U: NEGATIVE
GLUCOSE SERPL-MCNC: 87 MG/DL
HBV CORE IGG+IGM SER QL: NONREACTIVE
HBV SURFACE AB SER QL: NONREACTIVE
HBV SURFACE AG SER QL: NONREACTIVE
HCV AB SER QL: NONREACTIVE
HCV S/CO RATIO: 0.11 S/CO
HYALINE CASTS: 0 /LPF
KETONES URINE: NEGATIVE
LEUKOCYTE ESTERASE URINE: ABNORMAL
MICROSCOPIC-UA: NORMAL
NITRITE URINE: NEGATIVE
PARATHYROID HORMONE INTACT: 57 PG/ML
PH URINE: 5.5
POTASSIUM SERPL-SCNC: 4 MMOL/L
PROT SERPL-MCNC: 7.4 G/DL
PROTEIN URINE: NORMAL
RED BLOOD CELLS URINE: 4 /HPF
SODIUM SERPL-SCNC: 140 MMOL/L
SPECIFIC GRAVITY URINE: 1.03
SQUAMOUS EPITHELIAL CELLS: 22 /HPF
UROBILINOGEN URINE: NORMAL
VIT B12 SERPL-MCNC: 158 PG/ML
WHITE BLOOD CELLS URINE: 11 /HPF

## 2021-06-20 NOTE — ASSESSMENT
[FreeTextEntry1] : \par Right breast skin ulceration due to scratching:\par -She reports area has been pruritic but she has not seen rash\par -She has some mild surrounding erythema\par -will treat with Keflex x7 days and Bactroban ointment\par -She is to notify office if symptoms persist or worsen\par -She was advised to not scratch area\par \par Depression/psychosis:\par -She has been following up with therapist/psychiatrist\par -She remains on on Lexapro 10 mg daily and perphenazine 4 mg twice daily\par -Overall appears to be doing better\par \par Vitamin B12 deficiency:\par -She states she is currently taking vitamin B complex vitamin\par -Check vitamin B12 level\par -Risks of B12 deficiency discussed with patient at length\par -will give vitamin B12 1000 mcg via injection today\par -I did explain to her that she may need monthly injections\par \par HTN:\par -BP at goal today on amlodipine 10mg PO daily\par -I advised low fat/low cholesterol diet, low salt diet, and weight loss\par \par B/L knee pain:\par -She reports she has chronic bilateral knee pain\par -She is followed by orthopedics-I advise she schedule follow-up\par \par Lung mass/nodule:\par -CXR revealed hilar mass\par -CT chest revealed 1.7 x 1.5 lingular nodule possible hamartoma\par -She was seen by thoracic sx- to Dr Pa who ordered a PET CT which showed lung mass/nodule with minimal FGD activity which was indeterminate\par -I have advised that she schedule follow-up appointment with Dr. Pa-referral given today\par \par SEEMA:\par -she has been referred to pulmonary previously\par \par Morbid Obesity/pre-DM: \par -Hemoglobin A1c 5.7 2021\par -BMI 46\par -low fat/low chol diet and low carbohydrate diet advised\par -She has lost 15 pounds through diet and exercise\par \par Abnormal EKG\par -I again advised that she see cardiology \par -she denies chest pain, sob, palpitations\par \par Microscopic hematuria:\par -Check UA and urine culture\par -If this persists may need further work-up\par \par Vitamin D deficiency:\par -She is currently taking calcium plus vitamin D\par \par Elevated alkaline phosphatase:\par -Check hepatic function panel and hepatitis B and C serologies\par \par Hypertriglyceridemia:\par -Lipids at goal 2021\par \par Hypocalcemia:\par -Last calcium was 6.8\par -Check BMP and PTH\par -She is taking calcium plus vitamin D\par \par Constipation:\par - miralax for prn use\par \par Basal ganglia calcification:\par -will try to obtain MRI of brain results from [facility]\par \par HCM:\par \par CPE: 2019\par \par Depression screenin/15/2021 PHQ 9 score 3\par \par EKG: 10/17/2020\par \par Flu shot: advised 10/7/2020-she declined\par \par Tdap:  10/31/2019\par \par Shingles vaccine: she will check records\par \par Covid vaccine ([employer]) 2021\par \par HIV testing: negative 10/2019\par \par Hepatitis C screening: negative 10/2019\par \par Colonoscopy: 2018--had one hyperplastic polyp and one tubular adenoma\par \par Mammogram: 2020 BR 2-referred for mammogram today\par \par GYN/PAP: 2019- normal per pt-referred to GYN today for annual exam\par \par DEXA: 3/2020 normal\par \par QuantiFERON-TB test: 2020 negative\par \par F/U 4 to 6 weeks.  Labs drawn in office today

## 2021-06-20 NOTE — PHYSICAL EXAM
[No Acute Distress] : no acute distress [Well-Appearing] : well-appearing [Normal Voice/Communication] : normal voice/communication [Normal Sclera/Conjunctiva] : normal sclera/conjunctiva [PERRL] : pupils equal round and reactive to light [Normal Oropharynx] : the oropharynx was normal [Normal] : normal rate, regular rhythm, normal S1 and S2 and no murmur heard [No Edema] : there was no peripheral edema [Soft] : abdomen soft [No Masses] : no abdominal mass palpated [Non-distended] : non-distended [No HSM] : no HSM [Normal Bowel Sounds] : normal bowel sounds [No Rash] : no rash [No Focal Deficits] : no focal deficits [Normal Affect] : the affect was normal [Alert and Oriented x3] : oriented to person, place, and time [Normal Mood] : the mood was normal [Normal Insight/Judgement] : insight and judgment were intact [Non Tender] : non-tender [de-identified] : no calf tenderness [de-identified] : Obese [de-identified] : She was gowned for exam: MARIN Delgado present for exam-right breast skin examined-she has a 2 cm x 1 cm superficial ulceration with some mild surrounding erythema.  No obvious abscess.  Minimal tenderness.. No discharge. [de-identified] : See breast exam

## 2021-06-20 NOTE — REVIEW OF SYSTEMS
[Negative] : Neurological [Fever] : no fever [Chills] : no chills [Fatigue] : no fatigue [Chest Pain] : no chest pain [Palpitations] : no palpitations [Lower Ext Edema] : no lower extremity edema [Shortness Of Breath] : no shortness of breath [Wheezing] : no wheezing [Cough] : no cough [Dyspnea on Exertion] : no dyspnea on exertion [Abdominal Pain] : no abdominal pain [Nausea] : no nausea [Diarrhea] : diarrhea [Vomiting] : no vomiting [Anxiety] : no anxiety [Suicidal] : not suicidal [Depression] : no depression [FreeTextEntry2] : Lost 15 pounds [de-identified] : See HPI

## 2021-07-23 ENCOUNTER — RESULT REVIEW (OUTPATIENT)
Age: 61
End: 2021-07-23

## 2021-07-23 ENCOUNTER — APPOINTMENT (OUTPATIENT)
Dept: INTERNAL MEDICINE | Facility: CLINIC | Age: 61
End: 2021-07-23
Payer: MEDICAID

## 2021-07-23 VITALS
OXYGEN SATURATION: 98 % | BODY MASS INDEX: 47.91 KG/M2 | HEIGHT: 60 IN | WEIGHT: 244 LBS | RESPIRATION RATE: 16 BRPM | SYSTOLIC BLOOD PRESSURE: 132 MMHG | HEART RATE: 74 BPM | DIASTOLIC BLOOD PRESSURE: 70 MMHG | TEMPERATURE: 98.2 F

## 2021-07-23 PROCEDURE — 99213 OFFICE O/P EST LOW 20 MIN: CPT | Mod: 25

## 2021-07-23 PROCEDURE — 96372 THER/PROPH/DIAG INJ SC/IM: CPT

## 2021-07-23 RX ORDER — CYANOCOBALAMIN 1000 UG/ML
1000 INJECTION INTRAMUSCULAR; SUBCUTANEOUS
Qty: 0 | Refills: 0 | Status: COMPLETED | OUTPATIENT
Start: 2021-07-23

## 2021-07-23 RX ADMIN — CYANOCOBALAMIN 0 MCG/ML: 1000 INJECTION INTRAMUSCULAR; SUBCUTANEOUS at 00:00

## 2021-07-24 NOTE — PHYSICAL EXAM
[No Acute Distress] : no acute distress [Well-Appearing] : well-appearing [Normal Voice/Communication] : normal voice/communication [Normal Sclera/Conjunctiva] : normal sclera/conjunctiva [PERRL] : pupils equal round and reactive to light [Normal Oropharynx] : the oropharynx was normal [Normal] : normal rate, regular rhythm, normal S1 and S2 and no murmur heard [No Edema] : there was no peripheral edema [No Rash] : no rash [Normal Affect] : the affect was normal [Alert and Oriented x3] : oriented to person, place, and time [Normal Mood] : the mood was normal [Normal Insight/Judgement] : insight and judgment were intact [de-identified] : Obese [de-identified] : Left side of scalp she has a 2 cm x 2 cm firm scalp lump/soft tissue mass which is nontender [de-identified] : no calf tenderness

## 2021-07-24 NOTE — HISTORY OF PRESENT ILLNESS
[de-identified] : Here for BP check and for vitamin b12 injection\par \par She states she needs amlodipine refilled\par \par She states she overall feels well\par \par She does report she feels a small lump on the left scalp near her temple.  She states it does not hurt.  She states she does not know how long its been there

## 2021-07-24 NOTE — ASSESSMENT
[FreeTextEntry1] : \par Right breast skin ulceration due to scratching:\par -She reports this is healed and symptoms have resolved\par \par Depression/psychosis:\par -She has been following up with therapist/psychiatrist\par -She remains on on Lexapro 10 mg daily and perphenazine 4 mg twice daily\par -Overall appears to be doing better\par \par Vitamin B12 deficiency:\par -She states she is currently taking vitamin B complex vitamin\par -will give vitamin B12 1000 mcg via injection today\par \par HTN:\par -BP near goal today on amlodipine 10mg PO daily\par -I advised low fat/low cholesterol diet, low salt diet, and weight loss\par \par B/L knee pain:\par -She is followed by orthopedics-I advise she schedule follow-up\par \par Lung mass/nodule:\par -CXR revealed hilar mass\par -CT chest revealed 1.7 x 1.5 lingular nodule possible hamartoma\par -She was seen by thoracic sx- to Dr Pa who ordered a PET CT which showed lung mass/nodule with minimal FGD activity which was indeterminate\par -I have again advised that she schedule follow-up appointment with Dr. Pa-referral given again today\par \par SEEMA:\par -she has been referred to pulmonary previously\par \par Morbid Obesity/pre-DM: \par -Hemoglobin A1c 5.7 2021\par -BMI 47\par -She has gained 8 pounds since last visit\par -low fat/low chol diet and low carbohydrate diet advised\par \par Abnormal EKG\par -I again advised that she see cardiology-referral given again today\par -she denies chest pain, sob, palpitations\par \par Microscopic hematuria:\par -Repeat urine studies normal\par \par Vitamin D deficiency:\par - taking calcium plus vitamin D\par \par Elevated alkaline phosphatase:\par -Her last alkaline phosphatase was 127 and GGT 20\par -hepatitis B and C serologies were negative\par \par Hypertriglyceridemia:\par -Lipids at goal 2021\par \par Hypocalcemia:\par -Repeat calcium level was normal at 9.5\par \par Constipation:\par - miralax for prn use\par \par Basal ganglia calcification:\par -Explained to patient that I have been unable to get results of her MRI of brain results from [facility]-I asked patient to see if she can try and obtain her MRI results for review\par \par Scalp lump:\par -Unclear if soft tissue mass versus bony abnormality\par -Check x-ray of skull\par \par HCM:\par \par CPE: 2019\par \par Depression screenin/15/2021 PHQ 9 score 3\par \par EKG: 10/17/2020\par \par Flu shot: advised 10/7/2020-she declined\par \par Tdap:  10/31/2019\par \par Shingles vaccine: she will check records\par \par Covid vaccine ([employer]) 2021\par \par HIV testing: negative 10/2019\par \par Hepatitis C screening: negative 10/2019\par \par Colonoscopy: 2018--had one hyperplastic polyp and one tubular adenoma\par \par Mammogram: 2020 BR 2-referred for mammogram again today\par \par GYN/PAP: 2019- normal per pt-referred to GYN again today for annual exam\par \par DEXA: 3/2020 normal\par \par QuantiFERON-TB test: 2020 negative\par \par F/U 6 weeks.

## 2021-07-24 NOTE — REVIEW OF SYSTEMS
[Negative] : Integumentary [Fever] : no fever [Chills] : no chills [Fatigue] : no fatigue [Chest Pain] : no chest pain [Palpitations] : no palpitations [Lower Ext Edema] : no lower extremity edema [Shortness Of Breath] : no shortness of breath [Wheezing] : no wheezing [Cough] : no cough [Dyspnea on Exertion] : no dyspnea on exertion [Abdominal Pain] : no abdominal pain [Nausea] : no nausea [Diarrhea] : diarrhea [Vomiting] : no vomiting [Anxiety] : no anxiety [Suicidal] : not suicidal [Depression] : no depression [FreeTextEntry2] : gained 8 lbs

## 2021-08-31 ENCOUNTER — APPOINTMENT (OUTPATIENT)
Dept: INTERNAL MEDICINE | Facility: CLINIC | Age: 61
End: 2021-08-31

## 2021-09-03 ENCOUNTER — APPOINTMENT (OUTPATIENT)
Dept: RADIOLOGY | Facility: CLINIC | Age: 61
End: 2021-09-03
Payer: MEDICAID

## 2021-09-03 ENCOUNTER — APPOINTMENT (OUTPATIENT)
Dept: MAMMOGRAPHY | Facility: CLINIC | Age: 61
End: 2021-09-03
Payer: MEDICAID

## 2021-09-03 ENCOUNTER — RESULT REVIEW (OUTPATIENT)
Age: 61
End: 2021-09-03

## 2021-09-03 ENCOUNTER — OUTPATIENT (OUTPATIENT)
Dept: OUTPATIENT SERVICES | Facility: HOSPITAL | Age: 61
LOS: 1 days | End: 2021-09-03
Payer: MEDICAID

## 2021-09-03 DIAGNOSIS — Z98.89 OTHER SPECIFIED POSTPROCEDURAL STATES: Chronic | ICD-10-CM

## 2021-09-03 DIAGNOSIS — Z12.39 ENCOUNTER FOR OTHER SCREENING FOR MALIGNANT NEOPLASM OF BREAST: ICD-10-CM

## 2021-09-03 DIAGNOSIS — R22.0 LOCALIZED SWELLING, MASS AND LUMP, HEAD: ICD-10-CM

## 2021-09-03 PROCEDURE — 70260 X-RAY EXAM OF SKULL: CPT | Mod: 26

## 2021-09-03 PROCEDURE — 77063 BREAST TOMOSYNTHESIS BI: CPT | Mod: 26

## 2021-09-03 PROCEDURE — 77067 SCR MAMMO BI INCL CAD: CPT | Mod: 26

## 2021-09-03 PROCEDURE — 77067 SCR MAMMO BI INCL CAD: CPT

## 2021-09-03 PROCEDURE — 77063 BREAST TOMOSYNTHESIS BI: CPT

## 2021-09-03 PROCEDURE — 70260 X-RAY EXAM OF SKULL: CPT

## 2021-09-09 ENCOUNTER — APPOINTMENT (OUTPATIENT)
Dept: FAMILY MEDICINE | Facility: CLINIC | Age: 61
End: 2021-09-09
Payer: MEDICAID

## 2021-09-09 PROCEDURE — 96372 THER/PROPH/DIAG INJ SC/IM: CPT

## 2021-09-09 RX ORDER — CYANOCOBALAMIN 1000 UG/ML
1000 INJECTION INTRAMUSCULAR; SUBCUTANEOUS
Qty: 0 | Refills: 0 | Status: COMPLETED | OUTPATIENT
Start: 2021-09-08

## 2021-11-10 ENCOUNTER — NON-APPOINTMENT (OUTPATIENT)
Age: 61
End: 2021-11-10

## 2021-11-10 ENCOUNTER — APPOINTMENT (OUTPATIENT)
Dept: INTERNAL MEDICINE | Facility: CLINIC | Age: 61
End: 2021-11-10
Payer: MEDICAID

## 2021-11-10 VITALS
HEIGHT: 60 IN | OXYGEN SATURATION: 96 % | WEIGHT: 250 LBS | HEART RATE: 91 BPM | RESPIRATION RATE: 16 BRPM | BODY MASS INDEX: 49.08 KG/M2 | DIASTOLIC BLOOD PRESSURE: 72 MMHG | SYSTOLIC BLOOD PRESSURE: 140 MMHG | TEMPERATURE: 97.7 F

## 2021-11-10 DIAGNOSIS — R35.0 FREQUENCY OF MICTURITION: ICD-10-CM

## 2021-11-10 DIAGNOSIS — Z00.00 ENCOUNTER FOR GENERAL ADULT MEDICAL EXAMINATION W/OUT ABNORMAL FINDINGS: ICD-10-CM

## 2021-11-10 DIAGNOSIS — Z78.9 OTHER SPECIFIED HEALTH STATUS: ICD-10-CM

## 2021-11-10 LAB
BILIRUB UR QL STRIP: NORMAL
GLUCOSE UR-MCNC: NORMAL
HCG UR QL: 0.2 EU/DL
HGB UR QL STRIP.AUTO: NORMAL
KETONES UR-MCNC: NORMAL
LEUKOCYTE ESTERASE UR QL STRIP: NORMAL
NITRITE UR QL STRIP: NORMAL
PH UR STRIP: 5.5
PROT UR STRIP-MCNC: NORMAL
SP GR UR STRIP: 1.02

## 2021-11-10 PROCEDURE — 96372 THER/PROPH/DIAG INJ SC/IM: CPT

## 2021-11-10 PROCEDURE — 96127 BRIEF EMOTIONAL/BEHAV ASSMT: CPT

## 2021-11-10 PROCEDURE — 81003 URINALYSIS AUTO W/O SCOPE: CPT | Mod: QW

## 2021-11-10 PROCEDURE — 93000 ELECTROCARDIOGRAM COMPLETE: CPT

## 2021-11-10 PROCEDURE — 99396 PREV VISIT EST AGE 40-64: CPT | Mod: 25

## 2021-11-10 RX ORDER — CYANOCOBALAMIN 1000 UG/ML
1000 INJECTION INTRAMUSCULAR; SUBCUTANEOUS
Qty: 0 | Refills: 0 | Status: COMPLETED | OUTPATIENT
Start: 2021-11-10

## 2021-11-10 RX ORDER — CEPHALEXIN 500 MG/1
500 TABLET ORAL
Qty: 14 | Refills: 0 | Status: DISCONTINUED | COMMUNITY
Start: 2021-06-15 | End: 2021-11-10

## 2021-11-10 RX ADMIN — CYANOCOBALAMIN 0 MCG/ML: 1000 INJECTION INTRAMUSCULAR; SUBCUTANEOUS at 00:00

## 2021-11-10 NOTE — PHYSICAL EXAM
[No Acute Distress] : no acute distress [Well-Appearing] : well-appearing [Normal Voice/Communication] : normal voice/communication [Normal Sclera/Conjunctiva] : normal sclera/conjunctiva [PERRL] : pupils equal round and reactive to light [Normal Oropharynx] : the oropharynx was normal [Normal] : normal rate, regular rhythm, normal S1 and S2 and no murmur heard [No Edema] : there was no peripheral edema [No Rash] : no rash [Normal Affect] : the affect was normal [Alert and Oriented x3] : oriented to person, place, and time [Normal Mood] : the mood was normal [Normal Insight/Judgement] : insight and judgment were intact [EOMI] : extraocular movements intact [Normal Outer Ear/Nose] : the outer ears and nose were normal in appearance [Normal TMs] : both tympanic membranes were normal [Normal Nasal Mucosa] : the nasal mucosa was normal [No Carotid Bruits] : no carotid bruits [Soft] : abdomen soft [Non Tender] : non-tender [Non-distended] : non-distended [No Masses] : no abdominal mass palpated [No HSM] : no HSM [Normal Bowel Sounds] : normal bowel sounds [No CVA Tenderness] : no CVA  tenderness [No Spinal Tenderness] : no spinal tenderness [No Joint Swelling] : no joint swelling [No Focal Deficits] : no focal deficits [de-identified] : Obese [de-identified] : no calf tenderness

## 2021-11-10 NOTE — HEALTH RISK ASSESSMENT
[Yes] : Yes [No] : In the past 12 months have you used drugs other than those required for medical reasons? No [PHQ-9 Positive] : PHQ-9 Positive [I have developed a follow-up plan documented below in the note.] : I have developed a follow-up plan documented below in the note. [Patient reported mammogram was normal] : Patient reported mammogram was normal [HIV test declined] : HIV test declined [With Family] : lives with family [] : No [de-identified] : rarely [MammogramDate] : 09/21

## 2021-11-10 NOTE — HISTORY OF PRESENT ILLNESS
[de-identified] : Here for CPE\par \par She states she was under a lot of stress as she just sold her house.  She is now living with her daughter Candice so things are better\par \par She reports she occasionally has urinary frequency.  She states she sometimes gets a little burning on urination.  She states she has hadf sx for a few weeks

## 2021-11-10 NOTE — REVIEW OF SYSTEMS
[Frequency] : frequency [Negative] : Psychiatric [Cough] : cough [Fever] : no fever [Chills] : no chills [Fatigue] : no fatigue [Earache] : no earache [Nasal Discharge] : no nasal discharge [Sore Throat] : no sore throat [Chest Pain] : no chest pain [Palpitations] : no palpitations [Lower Ext Edema] : no lower extremity edema [Shortness Of Breath] : no shortness of breath [Wheezing] : no wheezing [Dyspnea on Exertion] : no dyspnea on exertion [Abdominal Pain] : no abdominal pain [Nausea] : no nausea [Diarrhea] : diarrhea [Vomiting] : no vomiting [Anxiety] : no anxiety [Depression] : no depression [FreeTextEntry2] : weight gain [FreeTextEntry8] : see HPI

## 2021-11-10 NOTE — ASSESSMENT
[FreeTextEntry1] : \par \par Depression/psychosis:\par -She has been following up with therapist/psychiatrist\par -She remains on on Lexapro 10 mg daily and perphenazine 4 mg twice daily\par -appears to be doing well\par -PHQ 9 score 6-she states she had been a little stressed due to selling house but states she now feels better\par \par Vitamin B12 deficiency:\par -will give vitamin B12 1000 mcg via injection today\par \par HTN:\par -BP mildly elevated today but she has gained some weight\par -she remains on amlodipine 10mg PO daily\par -I advised low fat/low cholesterol diet, low salt diet, and weight loss\par -f/u 6 weeks for BP check\par \par B/L knee pain:\par -I have again advised she make appt to see Orthopedics\par \par Lung mass/nodule:\par -CXR revealed hilar mass\par -CT chest revealed 1.7 x 1.5 lingular nodule possible hamartoma\par -She was seen by thoracic sx- to Dr Pa who ordered a PET CT which showed lung mass/nodule with minimal FGD activity which was indeterminate\par -I have again advised that she schedule follow-up appointment with Dr. Pa-referral given again today 11/10/2021\par -will order F/U CT chest\par -she reports occasional cough-will review CT results-if no sig change may need to see Pulmonary for evaluation of cough\par \par SEEMA:\par -she has been referred to pulmonary previously\par \par Morbid Obesity/pre-DM: \par -Hemoglobin A1c 5.7 2021\par -BMI 48.8\par -low fat/low chol diet and low carbohydrate diet advised\par -weight loss advised\par -fasting labs ordered\par \par Abnormal EKG\par -EKG today NSR at 72, poor R wave progression, non specific t wave abnormalities\par -she is asymptoamtic\par -I again advised that she see cardiology-referral given again today 11/10/2021\par \par Vitamin D deficiency:\par -check vitamin D 25-OH\par \par Elevated alkaline phosphatase:\par -check labs\par -hepatitis B and C serologies were negative\par \par Hypertriglyceridemia:\par -check fasting lipids\par \par Choroid plexus calcification:\par -seen on recent skull xray\par -I advised MRI of brain but she has not gone-will order again today\par -xray of skull did not reveal any obvious cause for scalp lump\par \par Urinary frequency/mild dysuria\par -urine dip + bili otherwise negative\par -will check UA and urine cx\par -referred to GYN today\par \par HCM:\par \par CPE: 11/10/2021\par \par Depression screenin/10/2021 PHQ 9 score 6\par \par EK/10/2021\par \par Flu shot: advised 11/10/2021-she declined\par \par Tdap:  10/31/2019\par \par Shingles vaccine: I advised she check with insurance to see if covered\par \par Covid vaccine ([employer]) 2021-covid booster advised\par \par HIV testing: negative 10/2019-offered 11/10/2021-she declined\par \par Hepatitis C screening: negative 10/2019\par \par Colonoscopy: 2018--had one hyperplastic polyp and one tubular adenoma\par \par Mammogram: 2021 BR 1\par \par GYN/PAP: 2019- normal per pt-referred to GYN again today for annual exam again today 11/10/2021\par \par DEXA: 3/2020 normal\par \par QuantiFERON-TB test: 2020 negative\par \par F/U 6 weeks. She will have fasting labs done at the lab

## 2021-11-11 LAB
APPEARANCE: CLEAR
BACTERIA: NEGATIVE
BILIRUBIN URINE: NEGATIVE
BLOOD URINE: NEGATIVE
COLOR: YELLOW
GLUCOSE QUALITATIVE U: NEGATIVE
HYALINE CASTS: 4 /LPF
KETONES URINE: NEGATIVE
LEUKOCYTE ESTERASE URINE: NEGATIVE
MICROSCOPIC-UA: NORMAL
NITRITE URINE: NEGATIVE
PH URINE: 5.5
PROTEIN URINE: NORMAL
RED BLOOD CELLS URINE: 2 /HPF
SPECIFIC GRAVITY URINE: 1.02
SQUAMOUS EPITHELIAL CELLS: 7 /HPF
UROBILINOGEN URINE: NORMAL
WHITE BLOOD CELLS URINE: 6 /HPF

## 2021-11-12 ENCOUNTER — NON-APPOINTMENT (OUTPATIENT)
Age: 61
End: 2021-11-12

## 2021-11-12 LAB — BACTERIA UR CULT: NORMAL

## 2021-11-30 ENCOUNTER — APPOINTMENT (OUTPATIENT)
Dept: INTERNAL MEDICINE | Facility: CLINIC | Age: 61
End: 2021-11-30
Payer: MEDICAID

## 2021-11-30 ENCOUNTER — NON-APPOINTMENT (OUTPATIENT)
Age: 61
End: 2021-11-30

## 2021-11-30 VITALS
TEMPERATURE: 97.8 F | WEIGHT: 248 LBS | DIASTOLIC BLOOD PRESSURE: 80 MMHG | SYSTOLIC BLOOD PRESSURE: 130 MMHG | BODY MASS INDEX: 48.69 KG/M2 | HEART RATE: 73 BPM | RESPIRATION RATE: 16 BRPM | OXYGEN SATURATION: 98 % | HEIGHT: 60 IN

## 2021-11-30 DIAGNOSIS — G93.89 OTHER SPECIFIED DISORDERS OF BRAIN: ICD-10-CM

## 2021-11-30 DIAGNOSIS — R15.9 FULL INCONTINENCE OF FECES: ICD-10-CM

## 2021-11-30 LAB
BILIRUB UR QL STRIP: NORMAL
GLUCOSE UR-MCNC: NORMAL
HCG UR QL: 0.2 EU/DL
HGB UR QL STRIP.AUTO: NORMAL
KETONES UR-MCNC: NORMAL
LEUKOCYTE ESTERASE UR QL STRIP: NORMAL
NITRITE UR QL STRIP: NORMAL
PH UR STRIP: 5.5
PROT UR STRIP-MCNC: NORMAL
SP GR UR STRIP: 1.03

## 2021-11-30 PROCEDURE — 99214 OFFICE O/P EST MOD 30 MIN: CPT | Mod: 25

## 2021-11-30 PROCEDURE — 81003 URINALYSIS AUTO W/O SCOPE: CPT | Mod: QW

## 2021-12-01 LAB
ALBUMIN SERPL ELPH-MCNC: 4.4 G/DL
ALP BLD-CCNC: 112 U/L
ALT SERPL-CCNC: 27 U/L
ANION GAP SERPL CALC-SCNC: 14 MMOL/L
AST SERPL-CCNC: 29 U/L
BASOPHILS # BLD AUTO: 0.05 K/UL
BASOPHILS NFR BLD AUTO: 0.6 %
BILIRUB SERPL-MCNC: 0.4 MG/DL
BUN SERPL-MCNC: 15 MG/DL
CALCIUM SERPL-MCNC: 9.6 MG/DL
CHLORIDE SERPL-SCNC: 105 MMOL/L
CO2 SERPL-SCNC: 24 MMOL/L
CREAT SERPL-MCNC: 0.69 MG/DL
EOSINOPHIL # BLD AUTO: 0.33 K/UL
EOSINOPHIL NFR BLD AUTO: 3.7 %
GLUCOSE SERPL-MCNC: 153 MG/DL
HCT VFR BLD CALC: 44 %
HGB BLD-MCNC: 13.6 G/DL
IMM GRANULOCYTES NFR BLD AUTO: 0.4 %
LYMPHOCYTES # BLD AUTO: 2.72 K/UL
LYMPHOCYTES NFR BLD AUTO: 30.2 %
MAN DIFF?: NORMAL
MCHC RBC-ENTMCNC: 27.8 PG
MCHC RBC-ENTMCNC: 30.9 GM/DL
MCV RBC AUTO: 89.8 FL
MONOCYTES # BLD AUTO: 0.41 K/UL
MONOCYTES NFR BLD AUTO: 4.5 %
NEUTROPHILS # BLD AUTO: 5.47 K/UL
NEUTROPHILS NFR BLD AUTO: 60.6 %
PLATELET # BLD AUTO: 246 K/UL
POTASSIUM SERPL-SCNC: 4.2 MMOL/L
PROT SERPL-MCNC: 7 G/DL
RBC # BLD: 4.9 M/UL
RBC # FLD: 13.8 %
SODIUM SERPL-SCNC: 143 MMOL/L
WBC # FLD AUTO: 9.02 K/UL

## 2021-12-01 PROCEDURE — G9005: CPT

## 2021-12-02 ENCOUNTER — NON-APPOINTMENT (OUTPATIENT)
Age: 61
End: 2021-12-02

## 2021-12-04 PROBLEM — G93.89 CALCIFICATION OF BRAIN: Status: ACTIVE | Noted: 2021-09-06

## 2021-12-04 NOTE — REVIEW OF SYSTEMS
[Negative] : Psychiatric [Incontinence] : incontinence [Fever] : no fever [Chills] : no chills [Fatigue] : no fatigue [Earache] : no earache [Nasal Discharge] : no nasal discharge [Sore Throat] : no sore throat [Chest Pain] : no chest pain [Palpitations] : no palpitations [Lower Ext Edema] : no lower extremity edema [Shortness Of Breath] : no shortness of breath [Wheezing] : no wheezing [Cough] : no cough [Dyspnea on Exertion] : no dyspnea on exertion [Dysuria] : no dysuria [Frequency] : no frequency [FreeTextEntry7] : See HPI [FreeTextEntry8] : see HPI

## 2021-12-04 NOTE — ASSESSMENT
[FreeTextEntry1] : \par Intermittent diarrhea\par -Associated at times with fecal and urinary incontinence\par -Dairy appears to aggravate symptoms so there may be some element of lactose intolerance\par -I have advised that she avoid dairy\par -Check labs\par -Check stool studies\par -urine dip in office was normal\par -I have advised that she see GI for evaluation\par -She is to notify office of new or worsening symptoms\par -If symptoms worsen or if she develops fever/chills or abdominal pain she is to go to the emergency room\par \par Depression/psychosis:\par -She has been following up with therapist/psychiatrist\par - on Lexapro 10 mg daily and perphenazine 4 mg twice daily\par \par Vitamin B12 deficiency:\par -She has been advised to get monthly B12 injections\par \par HTN:\par -BP at goal today\par -amlodipine 10mg PO daily\par -I advised low fat/low cholesterol diet, low salt diet, and weight loss\par \par B/L knee pain:\par -She has been previously advised to follow-up with orthopedics\par \par Lung mass/nodule:\par -CXR revealed hilar mass\par -CT chest revealed 1.7 x 1.5 lingular nodule possible hamartoma\par -She was seen by thoracic sx- to Dr Pa who ordered a PET CT which showed lung mass/nodule with minimal FGD activity which was indeterminate\par -I have again advised today 2021 that she schedule follow-up appointment with Dr. Pa\par -At last visit I did order follow-up CT chest but she has not gotten this done yet-I again advised that she have study done\par -No cough reported today\par \par SEEMA:\par -she has been referred to pulmonary previously\par \par Morbid Obesity/pre-DM: \par -Hemoglobin A1c 5.7 2021\par -weight loss advised\par -2021 lipids at goal\par -2021 labs reviewed with patient-glucose minimally elevated at 103 but her hemoglobin A1c was normal at 5.6\par \par Abnormal EKG\par -She remains asymptomatic from the cardiac standpoint\par -I have previously advised that she see cardiology for further evaluation\par \par Vitamin D deficiency:\par -Her recent vitamin D 25 OH level was 27 on 2021\par -I have advised OTC vitamin D3 1000 units once a day\par \par Elevated alkaline phosphatase:\par -2021 alkaline phosphatase level was normal\par -hepatitis B and C serologies were negative\par \par Hypertriglyceridemia:\par -Her recent lipids 2021 were normal\par \par Choroid plexus calcification:\par -seen on recent skull xray\par -I previously advised MRI of brain but she still has not gone for study.  I again advised\par \par \par HCM:\par \par CPE: 11/10/2021\par \par Depression screenin/10/2021 PHQ 9 score 6\par \par EK/10/2021\par \par Flu shot: advised 11/10/2021-she declined\par \par Tdap:  10/31/2019\par \par Shingles vaccine: I previously advised she check with insurance to see if covered\par \par Covid vaccine ([employer]) 2021-covid booster advised\par \par HIV testing: negative 10/2019-offered 11/10/2021-she declined\par \par Hepatitis C screening: negative 10/2019\par \par Colonoscopy: 2018--had one hyperplastic polyp and one tubular adenoma\par \par Mammogram: 2021 BR 1\par \par GYN/PAP: 2019- normal per pt-at last visit I did refer her to see GYN for annual exam\par \par DEXA: 3/2020 normal\par \par QuantiFERON-TB test: 2020 negative\par \par F/U 2 to 3 weeks.  Labs drawn in office today.  She is to notify office if symptoms persist or worsen

## 2021-12-04 NOTE — PHYSICAL EXAM
[No Acute Distress] : no acute distress [Normal Voice/Communication] : normal voice/communication [Normal Sclera/Conjunctiva] : normal sclera/conjunctiva [PERRL] : pupils equal round and reactive to light [Normal Oropharynx] : the oropharynx was normal [Normal] : normal rate, regular rhythm, normal S1 and S2 and no murmur heard [No Edema] : there was no peripheral edema [Soft] : abdomen soft [Non Tender] : non-tender [Non-distended] : non-distended [No Masses] : no abdominal mass palpated [No HSM] : no HSM [Normal Bowel Sounds] : normal bowel sounds [No CVA Tenderness] : no CVA  tenderness [No Spinal Tenderness] : no spinal tenderness [No Rash] : no rash [Normal Affect] : the affect was normal [Alert and Oriented x3] : oriented to person, place, and time [Normal Mood] : the mood was normal [Normal Insight/Judgement] : insight and judgment were intact [de-identified] : Obese [de-identified] : [place]

## 2021-12-04 NOTE — HISTORY OF PRESENT ILLNESS
[FreeTextEntry8] : July is here today for evaluation of intermittent diarrhea.  She states she has been having intermittent diarrhea for the past 3 weeks.  She states she gets diarrhea every time that she eats.  She states that after she eats she feels and hears gurgling in her stomach and then has to run to the bathroom.  She states her diarrhea is foul-smelling.  She states at times she has had some fecal incontinence as she has not made it to the bathroom.  She states sometimes when she has diarrhea she also gets urinary incontinence.  She states symptoms seem to be worse when she eats or drinks dairy.  She denies any significant abdominal pain.  She denies fever/chills.  She denies nausea, vomiting, rectal bleeding, urinary complaints.  She denies eating any out of the ordinary foods.

## 2021-12-15 ENCOUNTER — APPOINTMENT (OUTPATIENT)
Dept: INTERNAL MEDICINE | Facility: CLINIC | Age: 61
End: 2021-12-15
Payer: MEDICAID

## 2021-12-15 VITALS
SYSTOLIC BLOOD PRESSURE: 126 MMHG | OXYGEN SATURATION: 98 % | DIASTOLIC BLOOD PRESSURE: 80 MMHG | BODY MASS INDEX: 48.1 KG/M2 | TEMPERATURE: 98 F | RESPIRATION RATE: 16 BRPM | WEIGHT: 245 LBS | HEART RATE: 63 BPM | HEIGHT: 60 IN

## 2021-12-15 PROCEDURE — 96372 THER/PROPH/DIAG INJ SC/IM: CPT

## 2021-12-15 PROCEDURE — 99213 OFFICE O/P EST LOW 20 MIN: CPT | Mod: 25

## 2021-12-15 RX ORDER — CYANOCOBALAMIN 1000 UG/ML
1000 INJECTION INTRAMUSCULAR; SUBCUTANEOUS
Qty: 0 | Refills: 0 | Status: COMPLETED | OUTPATIENT
Start: 2021-12-15

## 2021-12-15 RX ADMIN — CYANOCOBALAMIN 0 MCG/ML: 1000 INJECTION INTRAMUSCULAR; SUBCUTANEOUS at 00:00

## 2021-12-15 NOTE — REVIEW OF SYSTEMS
[Negative] : Psychiatric [Fever] : no fever [Chills] : no chills [Fatigue] : no fatigue [Earache] : no earache [Nasal Discharge] : no nasal discharge [Sore Throat] : no sore throat [Chest Pain] : no chest pain [Palpitations] : no palpitations [Lower Ext Edema] : no lower extremity edema [Shortness Of Breath] : no shortness of breath [Wheezing] : no wheezing [Cough] : no cough [Dyspnea on Exertion] : no dyspnea on exertion [Abdominal Pain] : no abdominal pain [Vomiting] : no vomiting

## 2021-12-15 NOTE — PHYSICAL EXAM
[No Acute Distress] : no acute distress [Normal Voice/Communication] : normal voice/communication [Normal Sclera/Conjunctiva] : normal sclera/conjunctiva [PERRL] : pupils equal round and reactive to light [Normal Oropharynx] : the oropharynx was normal [No Edema] : there was no peripheral edema [Soft] : abdomen soft [Non Tender] : non-tender [Non-distended] : non-distended [No Masses] : no abdominal mass palpated [No HSM] : no HSM [Normal Bowel Sounds] : normal bowel sounds [No Rash] : no rash [Normal Affect] : the affect was normal [Alert and Oriented x3] : oriented to person, place, and time [Normal Mood] : the mood was normal [Normal Insight/Judgement] : insight and judgment were intact [Well-Appearing] : well-appearing [Normal] : no jugular venous distention, supple, no lymphadenopathy and the thyroid was normal and there were no nodules present [No Focal Deficits] : no focal deficits [de-identified] : Obese

## 2021-12-15 NOTE — ASSESSMENT
[FreeTextEntry1] : \par S/P covid 19\par -she has mild sx for a few days and is no asymptomatic\par \par Intermittent diarrhea\par -sx have improved\par -prevous labs normal\par -stool studies ordered previously\par -she was referred to GI for evaluation previously\par -She is to notify office if sx recur\par \par Depression/psychosis:\par -She has been following up with therapist/psychiatrist\par -on Lexapro 10 mg daily and perphenazine 4 mg twice daily\par \par Vitamin B12 deficiency:\par -She has been advised to get monthly B12 injections\par -will give b12 shot today\par \par HTN:\par -BP at goal today\par -amlodipine 10mg PO daily\par \par B/L knee pain:\par -She has been previously advised to follow-up with orthopedics-I again advised\par \par Lung mass/nodule:\par -CXR revealed hilar mass\par -CT chest revealed 1.7 x 1.5 lingular nodule possible hamartoma\par -She was seen by thoracic sx- to Dr Pa who ordered a PET CT which showed lung mass/nodule with minimal FGD activity which was indeterminate\par -I have again advised today 2021 that she schedule follow-up appointment with Dr. Pa\par -At last visit I did order follow-up CT chest but she has not gotten this done yet\par -I again advised today 12/15/2021 that she have study done\par \par \par SEEMA:\par -she has been referred to pulmonary previously-I again advised\par \par Morbid Obesity/pre-DM: \par -Hemoglobin A1c 5.7 2021\par -weight loss advised\par \par Abnormal EKG\par -She remains asymptomatic from the cardiac standpoint\par -I have previously advised that she see cardiology for further evaluation.  I again advised 12/15/2021\par \par Vitamin D deficiency:\par -OTC vitamin D3 1000 units once a day\par \par Hypertriglyceridemia:\par -Her recent lipids 2021 were normal\par \par Choroid plexus calcification:\par -seen on recent skull xray\par -I previously advised MRI of brain but she still has not gone for study.  I again advised today 12/15/2021\par \par \par HCM:\par \par CPE: 11/10/2021\par \par Depression screenin/10/2021 PHQ 9 score 6\par \par EK/10/2021\par \par Flu shot: advised 12/15/2021-she declined\par \par Tdap:  10/31/2019\par \par Shingles vaccine: I previously advised she check with insurance to see if covered\par \par Covid vaccine ([employer]) 2021-covid booster advised again today 12/15/2021\par \par HIV testing: negative 10/2019-offered 11/10/2021-she declined\par \par Hepatitis C screening: negative 10/2019\par \par Colonoscopy: 2018--had one hyperplastic polyp and one tubular adenoma\par \par Mammogram: 2021 BR 1\par \par GYN/PAP: 2019- normal per pt-at last visit I did refer her to see GYN for annual exam\par \par DEXA: 3/2020 normal\par \par QuantiFERON-TB test: 2020 negative\par \par F/U 3 months.  She will f/u in 4 weeks with RN.  I told her as her daughter is RN we can prescribe b12 to be given at home by her daughter but she states she prefers to come to office for b12 shots

## 2021-12-15 NOTE — HISTORY OF PRESENT ILLNESS
[de-identified] : Here for follow up\par \par She reports on 12/3/2021 she had covid 19.  She states she had mild sx x 3 days with mild fever, cough, and sneezing.  She states sx have all resolved.  She states she did not want monoclonal antibody at the time\par \par She states previously reported diarrhea seems to have gotten better.  She has not done stool studies.  \par \par She states she has not been able to do any studies or referrals \par \par She would like her vitamin b12 shot today

## 2021-12-21 ENCOUNTER — APPOINTMENT (OUTPATIENT)
Dept: INTERNAL MEDICINE | Facility: CLINIC | Age: 61
End: 2021-12-21

## 2022-01-19 ENCOUNTER — APPOINTMENT (OUTPATIENT)
Dept: GASTROENTEROLOGY | Facility: CLINIC | Age: 62
End: 2022-01-19

## 2022-04-11 PROBLEM — Z11.59 SCREENING FOR VIRAL DISEASE: Status: ACTIVE | Noted: 2020-05-26

## 2022-05-11 ENCOUNTER — APPOINTMENT (OUTPATIENT)
Dept: INTERNAL MEDICINE | Facility: CLINIC | Age: 62
End: 2022-05-11
Payer: MEDICAID

## 2022-05-11 VITALS
HEIGHT: 60 IN | HEART RATE: 70 BPM | DIASTOLIC BLOOD PRESSURE: 86 MMHG | BODY MASS INDEX: 48.29 KG/M2 | WEIGHT: 246 LBS | SYSTOLIC BLOOD PRESSURE: 132 MMHG | RESPIRATION RATE: 16 BRPM | TEMPERATURE: 97.4 F | OXYGEN SATURATION: 99 %

## 2022-05-11 DIAGNOSIS — H53.8 OTHER VISUAL DISTURBANCES: ICD-10-CM

## 2022-05-11 LAB
BILIRUB UR QL STRIP: NORMAL
CLARITY UR: CLEAR
GLUCOSE UR-MCNC: NORMAL
HCG UR QL: 0.2 EU/DL
HGB UR QL STRIP.AUTO: NORMAL
KETONES UR-MCNC: NORMAL
LEUKOCYTE ESTERASE UR QL STRIP: NORMAL
NITRITE UR QL STRIP: NORMAL
PH UR STRIP: 5.5
PROT UR STRIP-MCNC: NORMAL
SP GR UR STRIP: 1.01

## 2022-05-11 PROCEDURE — 96127 BRIEF EMOTIONAL/BEHAV ASSMT: CPT

## 2022-05-11 PROCEDURE — 99215 OFFICE O/P EST HI 40 MIN: CPT | Mod: 25

## 2022-05-11 PROCEDURE — 81003 URINALYSIS AUTO W/O SCOPE: CPT | Mod: QW

## 2022-05-11 PROCEDURE — 96372 THER/PROPH/DIAG INJ SC/IM: CPT

## 2022-05-11 RX ORDER — CYANOCOBALAMIN 1000 UG/ML
1000 INJECTION INTRAMUSCULAR; SUBCUTANEOUS
Qty: 0 | Refills: 0 | Status: COMPLETED | OUTPATIENT
Start: 2022-05-11

## 2022-05-11 RX ADMIN — CYANOCOBALAMIN 0 MCG/ML: 1000 INJECTION INTRAMUSCULAR; SUBCUTANEOUS at 00:00

## 2022-05-15 PROBLEM — H53.8 BLURRY VISION: Status: ACTIVE | Noted: 2022-05-15

## 2022-05-15 NOTE — PHYSICAL EXAM
[No Acute Distress] : no acute distress [Well-Appearing] : well-appearing [Normal Voice/Communication] : normal voice/communication [Normal Sclera/Conjunctiva] : normal sclera/conjunctiva [PERRL] : pupils equal round and reactive to light [Normal Oropharynx] : the oropharynx was normal [Normal] : normal rate, regular rhythm, normal S1 and S2 and no murmur heard [No Edema] : there was no peripheral edema [Soft] : abdomen soft [Non Tender] : non-tender [Non-distended] : non-distended [No Masses] : no abdominal mass palpated [No HSM] : no HSM [Normal Bowel Sounds] : normal bowel sounds [No Rash] : no rash [Normal Affect] : the affect was normal [Alert and Oriented x3] : oriented to person, place, and time [Normal Mood] : the mood was normal [Normal Insight/Judgement] : insight and judgment were intact [No CVA Tenderness] : no CVA  tenderness [No Focal Deficits] : no focal deficits [de-identified] : Obese

## 2022-05-15 NOTE — REVIEW OF SYSTEMS
[Negative] : Psychiatric [Fever] : no fever [Chills] : no chills [Fatigue] : no fatigue [Earache] : no earache [Nasal Discharge] : no nasal discharge [Sore Throat] : no sore throat [Chest Pain] : no chest pain [Palpitations] : no palpitations [Lower Ext Edema] : no lower extremity edema [Shortness Of Breath] : no shortness of breath [Wheezing] : no wheezing [Cough] : no cough [Dyspnea on Exertion] : no dyspnea on exertion [Abdominal Pain] : no abdominal pain [Vomiting] : no vomiting [FreeTextEntry3] : See HPI [FreeTextEntry8] : See HPI

## 2022-05-15 NOTE — HISTORY OF PRESENT ILLNESS
[de-identified] : She is here today because she thinks she might have a urinary tract infection\par \par She states she recently returned back from [place] and states she has some burning on urination and urinary frequency.  She states she has had symptoms for about 4 to 5 days.  She states occasionally due to this problem she has some mild urinary incontinence.  She denies any fever/chills.  She denies abdominal pain, nausea, vomiting.\par \par She states she has been having some mild blurry vision.  She states she did see ophthalmologist not too long ago and was told her vision was fine.  She states she was told that she has cataracts.\par \par She is here today for her vitamin B12 shot also

## 2022-05-15 NOTE — ASSESSMENT
[FreeTextEntry1] : \par UTI\par -Urine dip in office shows LE\par -Check UA and urine culture\par -Will treat with 7-day course of Macrobid\par -She is to notify office if symptoms persist or worsen\par \par Blurry vision\par -She states she was seen by ophthalmologist and told she had cataracts\par -Symptoms persist I advised that she follow-up with ophthalmologist\par -Check labs\par \par Intermittent diarrhea\par -She states symptoms have improved but not gone completely\par -stool studies ordered previously but she still has not gone for them\par -she was referred to GI for evaluation previously-I again advised today\par \par Depression/psychosis:\par -She is followed by therapist/psychiatrist\par -on Lexapro 10 mg daily and perphenazine 4 mg twice daily\par \par Vitamin B12 deficiency:\par -She has been advised to get monthly B12 injections-will give B12 shot today\par -She states she continues to take oral vitamin B12 1000 mcg at home\par -Check labs\par \par HTN:\par -BP today 132/86\par -amlodipine 10mg PO daily\par -She should adhere to a low-fat, low-cholesterol, low carbohydrate diet and weight loss\par \par B/L knee pain:\par -She has been previously advised to follow-up with orthopedics-I again advised\par \par Lung mass/nodule:\par -CXR revealed hilar mass\par -CT chest revealed 1.7 x 1.5 lingular nodule possible hamartoma\par -She was seen by thoracic sx- to Dr Pa who ordered a PET CT which showed lung mass/nodule with minimal FGD activity which was indeterminate\par -I have again advised today 2022 that she schedule follow-up appointment with Dr. Pa\par -I have again today 2022 advised that she get CT chest to follow-up on lung nodule/mass-importance of follow-up discussed at length with patient\par \par SEEMA:\par -she has been referred to pulmonary previously-we will discuss this again with her at next visit\par \par Morbid Obesity/pre-DM: \par -Hemoglobin A1c 5.7 2021\par -weight loss advised\par \par Abnormal EKG\par -She remains asymptomatic from the cardiac standpoint\par -She has been referred to cardiology multiple times in the past for abnormal EKG and she still has not gone.  I again today 2022 advised that she see cardiology for evaluation.  She states she will make appointment\par \par Vitamin D deficiency:\par -OTC vitamin D3 1000 units once a day\par -Check vitamin D 25 OH\par \par Hypertriglyceridemia:\par -Her recent lipids 2021 were normal\par \par Choroid plexus calcification:\par -seen on recent skull xray\par -I previously advised MRI of brain but she still has not gone for study.  I again advised today 2022-MRI was ordered again today\par \par \par HCM:\par \par CPE: 11/10/2021\par \par Depression screenin2022 PHQ 9 score 4\par \par EK/10/2021\par \par Flu shot: advised 12/15/2021-she declined\par \par Tdap:  10/31/2019\par \par Shingles vaccine: Advised previously\par \par Covid vaccine ([employer]) 2021-J+J booster\par \par HIV testing: negative 10/2019-offered 11/10/2021-she declined\par \par Hepatitis C screening: negative 10/2019\par \par Colonoscopy: 2018--had one hyperplastic polyp and one tubular adenoma\par \par Mammogram: 2021 BR 1\par \par GYN/PAP: She reports she saw GYN 2022 for annual exam\par \par DEXA: 3/2020 normal\par \par QuantiFERON-TB test: 2020 negative\par \par F/U 6 weeks.  Labs drawn in office today.  I had long conversation with patient regarding her noncompliance with diagnostic plan and referrals to see specialist.  I advised her of importance of keeping appointments and seeing specialist that I recommend.  She states she is committed now to making appointments to see all specialist that I recommended.

## 2022-05-18 ENCOUNTER — OUTPATIENT (OUTPATIENT)
Dept: OUTPATIENT SERVICES | Facility: HOSPITAL | Age: 62
LOS: 1 days | End: 2022-05-18
Payer: MEDICAID

## 2022-05-18 ENCOUNTER — APPOINTMENT (OUTPATIENT)
Dept: MRI IMAGING | Facility: CLINIC | Age: 62
End: 2022-05-18
Payer: MEDICAID

## 2022-05-18 DIAGNOSIS — Z98.89 OTHER SPECIFIED POSTPROCEDURAL STATES: Chronic | ICD-10-CM

## 2022-05-18 DIAGNOSIS — G93.89 OTHER SPECIFIED DISORDERS OF BRAIN: ICD-10-CM

## 2022-05-18 PROCEDURE — 70553 MRI BRAIN STEM W/O & W/DYE: CPT | Mod: 26

## 2022-05-18 PROCEDURE — 70553 MRI BRAIN STEM W/O & W/DYE: CPT

## 2022-05-18 PROCEDURE — A9585: CPT

## 2022-05-19 LAB
25(OH)D3 SERPL-MCNC: 35.7 NG/ML
ALBUMIN SERPL ELPH-MCNC: 4.2 G/DL
ALP BLD-CCNC: 120 U/L
ALT SERPL-CCNC: 25 U/L
ANION GAP SERPL CALC-SCNC: 12 MMOL/L
APPEARANCE: CLEAR
AST SERPL-CCNC: 32 U/L
BACTERIA UR CULT: ABNORMAL
BACTERIA: NEGATIVE
BASOPHILS # BLD AUTO: 0.06 K/UL
BASOPHILS NFR BLD AUTO: 0.7 %
BILIRUB SERPL-MCNC: 0.5 MG/DL
BILIRUBIN URINE: NEGATIVE
BLOOD URINE: NEGATIVE
BUN SERPL-MCNC: 13 MG/DL
CALCIUM SERPL-MCNC: 9.4 MG/DL
CHLORIDE SERPL-SCNC: 106 MMOL/L
CO2 SERPL-SCNC: 25 MMOL/L
COLOR: YELLOW
CREAT SERPL-MCNC: 0.72 MG/DL
EGFR: 94 ML/MIN/1.73M2
EOSINOPHIL # BLD AUTO: 0.31 K/UL
EOSINOPHIL NFR BLD AUTO: 3.5 %
ERYTHROCYTE [SEDIMENTATION RATE] IN BLOOD BY WESTERGREN METHOD: 39 MM/HR
GLUCOSE QUALITATIVE U: NEGATIVE
GLUCOSE SERPL-MCNC: 126 MG/DL
HCT VFR BLD CALC: 44.2 %
HGB BLD-MCNC: 13.8 G/DL
HYALINE CASTS: 2 /LPF
IMM GRANULOCYTES NFR BLD AUTO: 0.3 %
KETONES URINE: NEGATIVE
LEUKOCYTE ESTERASE URINE: ABNORMAL
LYMPHOCYTES # BLD AUTO: 2.74 K/UL
LYMPHOCYTES NFR BLD AUTO: 31.1 %
MAN DIFF?: NORMAL
MCHC RBC-ENTMCNC: 28.6 PG
MCHC RBC-ENTMCNC: 31.2 GM/DL
MCV RBC AUTO: 91.5 FL
MICROSCOPIC-UA: NORMAL
MONOCYTES # BLD AUTO: 0.55 K/UL
MONOCYTES NFR BLD AUTO: 6.2 %
NEUTROPHILS # BLD AUTO: 5.12 K/UL
NEUTROPHILS NFR BLD AUTO: 58.2 %
NITRITE URINE: NEGATIVE
PH URINE: 6
PLATELET # BLD AUTO: 245 K/UL
POTASSIUM SERPL-SCNC: 4.1 MMOL/L
PROT SERPL-MCNC: 7.1 G/DL
PROTEIN URINE: NORMAL
RBC # BLD: 4.83 M/UL
RBC # FLD: 13.4 %
RED BLOOD CELLS URINE: 4 /HPF
SODIUM SERPL-SCNC: 143 MMOL/L
SPECIFIC GRAVITY URINE: 1.02
SQUAMOUS EPITHELIAL CELLS: 7 /HPF
TSH SERPL-ACNC: 3.88 UIU/ML
UROBILINOGEN URINE: NORMAL
VIT B12 SERPL-MCNC: 492 PG/ML
WBC # FLD AUTO: 8.81 K/UL
WHITE BLOOD CELLS URINE: 46 /HPF

## 2022-05-20 ENCOUNTER — NON-APPOINTMENT (OUTPATIENT)
Age: 62
End: 2022-05-20

## 2022-05-27 ENCOUNTER — APPOINTMENT (OUTPATIENT)
Dept: CT IMAGING | Facility: CLINIC | Age: 62
End: 2022-05-27
Payer: MEDICAID

## 2022-05-27 ENCOUNTER — OUTPATIENT (OUTPATIENT)
Dept: OUTPATIENT SERVICES | Facility: HOSPITAL | Age: 62
LOS: 1 days | End: 2022-05-27
Payer: MEDICAID

## 2022-05-27 DIAGNOSIS — Z00.8 ENCOUNTER FOR OTHER GENERAL EXAMINATION: ICD-10-CM

## 2022-05-27 DIAGNOSIS — Z98.89 OTHER SPECIFIED POSTPROCEDURAL STATES: Chronic | ICD-10-CM

## 2022-05-27 PROCEDURE — 71250 CT THORAX DX C-: CPT | Mod: 26

## 2022-05-27 PROCEDURE — 71250 CT THORAX DX C-: CPT

## 2022-06-22 ENCOUNTER — APPOINTMENT (OUTPATIENT)
Dept: INTERNAL MEDICINE | Facility: CLINIC | Age: 62
End: 2022-06-22
Payer: MEDICAID

## 2022-06-22 VITALS
TEMPERATURE: 96.6 F | HEART RATE: 63 BPM | OXYGEN SATURATION: 97 % | DIASTOLIC BLOOD PRESSURE: 84 MMHG | WEIGHT: 250 LBS | SYSTOLIC BLOOD PRESSURE: 134 MMHG | HEIGHT: 60 IN | BODY MASS INDEX: 49.08 KG/M2 | RESPIRATION RATE: 17 BRPM

## 2022-06-22 DIAGNOSIS — R94.31 ABNORMAL ELECTROCARDIOGRAM [ECG] [EKG]: ICD-10-CM

## 2022-06-22 DIAGNOSIS — Z13.31 ENCOUNTER FOR SCREENING FOR DEPRESSION: ICD-10-CM

## 2022-06-22 PROCEDURE — 96127 BRIEF EMOTIONAL/BEHAV ASSMT: CPT

## 2022-06-22 PROCEDURE — 99215 OFFICE O/P EST HI 40 MIN: CPT | Mod: 25

## 2022-06-22 NOTE — HISTORY OF PRESENT ILLNESS
[de-identified] : Here for follow up of lung nodule, diarrhea, HTN\par \par She has not made appts to see GI, cardiology, orthopedics, or thoracic surgery as i previously advised\par \par She never got stool studies that I previously advised\par \par She states she still gets intermittent diarrhea after meals.  She states sx have improved but not gone away.  She denies abdominal pain, nausea, vomiting.\par \par She reports she has itchy dermatitis underneath B/L breasts

## 2022-06-22 NOTE — HEALTH RISK ASSESSMENT
[0] : 2) Feeling down, depressed, or hopeless: Not at all (0) [PHQ-2 Negative - No further assessment needed] : PHQ-2 Negative - No further assessment needed [[id number]] : 0

## 2022-06-22 NOTE — PHYSICAL EXAM
[No Acute Distress] : no acute distress [Well-Appearing] : well-appearing [Normal Voice/Communication] : normal voice/communication [Normal Sclera/Conjunctiva] : normal sclera/conjunctiva [PERRL] : pupils equal round and reactive to light [Normal Oropharynx] : the oropharynx was normal [Normal] : normal rate, regular rhythm, normal S1 and S2 and no murmur heard [No Edema] : there was no peripheral edema [Soft] : abdomen soft [Non Tender] : non-tender [Non-distended] : non-distended [No Masses] : no abdominal mass palpated [No HSM] : no HSM [Normal Bowel Sounds] : normal bowel sounds [Normal Affect] : the affect was normal [Alert and Oriented x3] : oriented to person, place, and time [Normal Mood] : the mood was normal [Normal Insight/Judgement] : insight and judgment were intact [de-identified] : Obese [de-identified] : she was draped for exam-MARIN Mathews chaperoned exam-she has hyperpigmentation of skin underneath B/L breasts

## 2022-06-22 NOTE — REVIEW OF SYSTEMS
[Negative] : Psychiatric [Fever] : no fever [Chills] : no chills [Fatigue] : no fatigue [Chest Pain] : no chest pain [Palpitations] : no palpitations [Lower Ext Edema] : no lower extremity edema [Shortness Of Breath] : no shortness of breath [Wheezing] : no wheezing [Cough] : no cough [Dyspnea on Exertion] : no dyspnea on exertion [Abdominal Pain] : no abdominal pain [Nausea] : no nausea [Vomiting] : no vomiting [Anxiety] : no anxiety [Depression] : no depression [FreeTextEntry7] : see HPI [de-identified] : see HPI

## 2022-06-22 NOTE — ASSESSMENT
[FreeTextEntry1] : \par \par Cataracts\par -She states she was seen by ophthalmologist and told she had cataracts\par -she was told she may need surgery but she states she has been busy so she has not scheduled\par \par Intermittent diarrhea\par -She states symptoms have improved but not gone completely\par -stool studies ordered again today\par -I have again referred her to GI for evaluation\par \par Depression/psychosis:\par -She is followed by therapist/psychiatrist\par -on Lexapro 10 mg daily and perphenazine 4 mg twice daily\par \par Vitamin B12 deficiency:\par -she should continue to take oral vitamin B12 1000 mcg at home\par -last b12 level was 492\par -Check labs in 3 months\par \par HTN:\par -BP today near goal\par -amlodipine 10mg PO daily\par -She should adhere to a low-fat, low-cholesterol, low carbohydrate diet and weight loss\par \par Chronic B/L knee pain:\par -She has been previously advised to follow-up with orthopedics-I again advised and referral given again today\par \par Lung mass/nodule:\par -CXR revealed hilar mass\par -previous CT chest revealed 1.7 x 1.5 lingular nodule possible hamartoma\par -She was seen by thoracic sx- to Dr Pa who ordered a PET CT which showed lung mass/nodule with minimal FGD activity which was indeterminate\par -She had repeat CT chest 2022 which showed 1.8 x 1.6 cm endobronchial; nodule in lingula possible carcinoid tumor\par -I have again advised today 2022 that she schedule follow-up appointment with Dr. Pa\par -Importance of follow up discussed with pt at length. I explained to her that carcinoid tumors can become malignant so it is important that she be evaluated by specialist\par \par SEEMA:\par -she has been referred to pulmonary previously-she states she refuses to use machine\par \par Morbid Obesity/pre-DM: \par -check HgA1c with next labs\par -weight loss advised\par \par Abnormal EKG\par -She remains asymptomatic from the cardiac standpoint\par -She has been referred to cardiology multiple times in the past for abnormal EKG and she still has not gone.  I again today 2022 advised that she see cardiology for evaluation.  referral given again\par \par Vitamin D deficiency:\par -OTC vitamin D3 1000 units once a day\par -vitamin D 25 OH was normal 2022\par \par Hypertriglyceridemia:\par -check fasting lipids with next labs\par \par Choroid plexus calcification:\par -MRI of brain showed choroid plexus calcification which is a normal variant\par \par Breast dermatitis\par -adv she keep area clean and dry\par -weight loss advised\par -trial of nystatin cream for prn use-she use for 1-2 weeks\par \par \par HCM:\par \par CPE: 11/10/2021\par \par Depression screenin2022 PHQ 2 score 0\par \par EK/10/2021\par \par Flu shot: advised 12/15/2021-she declined\par \par Tdap:  10/31/2019\par \par Shingles vaccine: Advised previously\par \par Covid vaccine ([employer]) 2021-J+J booster\par \par HIV testing: negative 10/2019-offered 11/10/2021-she declined\par \par Hepatitis C screening: negative 10/2019\par \par Colonoscopy: 2018--had one hyperplastic polyp and one tubular adenoma\par \par Mammogram: 2021 BR 1-mammogram ordered to be done 2022\par \par GYN/PAP:  2022 \par \par DEXA: 3/2020 normal\par \par QuantiFERON-TB test: 2020 negative\par \par F/U  3 months.  She will have fasting labs done at the lab 1 week prior to next visit.  I had long conversation with patient regarding her noncompliance with treatment plan.  She has been referred to cardiology, GI, orthopedics, thoracic surgery multiple times and has not made appointments.  Importance of follow-up discussed with patient.  She gave me permission to call her daughter Candice which I did today.  I reviewed her recent studies including CT chest.  I discussed with her importance of following up with specialists in particular cardiology and pulmonary.  I gave her new referrals today.  Her daughter states she will help her mother schedule appointments

## 2022-06-30 ENCOUNTER — APPOINTMENT (OUTPATIENT)
Dept: ORTHOPEDIC SURGERY | Facility: CLINIC | Age: 62
End: 2022-06-30

## 2022-06-30 DIAGNOSIS — M54.50 LOW BACK PAIN, UNSPECIFIED: ICD-10-CM

## 2022-06-30 PROCEDURE — 73564 X-RAY EXAM KNEE 4 OR MORE: CPT | Mod: LT,RT

## 2022-06-30 PROCEDURE — 99203 OFFICE O/P NEW LOW 30 MIN: CPT

## 2022-06-30 NOTE — REASON FOR VISIT
[Initial Visit] : an initial visit for [[employer] ] : provided by [employer]   [FreeTextEntry2] : bilateral knee pain  [Interpreters_IDNumber] : Juanita [Interpreters_FullName] : [id number] [TWNoteComboBox1] : Mongolian

## 2022-06-30 NOTE — HISTORY OF PRESENT ILLNESS
[de-identified] : Patient is a 61-year-old female presenting today for evaluation of bilateral knee pain has been going on for many years.  Patient states that a few years ago she had fluid drained from the knees as well as received injections and that helped a lot with the pain.  States that she was going to receive gel injections however they were not covered by her insurance.  Has not been to physical therapy.  States she has difficulty rising from a chair and navigating stairs.  Has not been taking any pain medications.  States the knees also feel stiff sometimes.  Denies any hip pain or neurovascular compromise in lower extremities.  Does complain of some buckling.  Denies any locking or popping

## 2022-06-30 NOTE — DISCUSSION/SUMMARY
[Medication Risks Reviewed] : Medication risks reviewed [Surgical risks reviewed] : Surgical risks reviewed [de-identified] : Patient is a 61-year-old female with moderate to severe bilateral knee osteoarthritis presenting today for initial evaluation.  We did discuss the possibility of cortisone injections however she would like to defer that at this time.  She would like to try physical therapy and have given her prescription for that.  I also recommend meloxicam 15 mg daily as needed for pain.  I given her referral to nutritionist to work on weight loss.  I like to see her back in 6 to 8 weeks for repeat evaluation.  If no improvement in symptoms we will consider cortisone injection at that time.  All questions were asked and answered with .

## 2022-06-30 NOTE — PHYSICAL EXAM
[de-identified] : GENERAL APPEARANCE: Well nourished and hydrated, pleasant, alert, and oriented x 3. Appears their stated age. \par HEENT: Normocephalic, extraocular eye motion intact. Nasal septum midline. Oral cavity clear. External auditory canal clear. \par RESPIRATORY: Breath sounds clear and audible in all lobes. No wheezing, No accessory muscle use.\par CARDIOVASCULAR: No apparent abnormalities. No lower leg edema. No varicosities. Pedal pulses are palpable.\par NEUROLOGIC: Sensation is normal, no muscle weakness in the upper or lower extremities.\par DERMATOLOGIC: No apparent skin lesions, moist, warm, no rash.\par SPINE: Cervical spine appears normal and moves freely; thoracic spine appears normal and moves freely; lumbosacral spine appears normal and moves freely, normal, nontender.\par MUSCULOSKELETAL: Hands, wrists, and elbows are normal and move freely, shoulders are normal and move freely. \par Psychiatric: Oriented to person, place, and time, insight and judgement were intact and the affect was normal. \par Musculoskeletal:. Left knee exam shows no effusion, ROM is 0-1 20 degrees, no instability, no pain with Mike, medial joint line tenderness. \par Right knee exam shows no effusion, ROM is 0-1 20 degrees, no instability, no pain with Mike, medial joint line tenderness. \par 5/5 motor strength in bilateral lower extremities. Sensory: Intact in bilateral lower extremities. DTRs: Biceps, brachioradialis, triceps, patellar, ankle and plantar 2+ and symmetric bilaterally. Pulses: dorsalis pedis, posterior tibial, femoral, popliteal, and radial 2+ and symmetric bilaterally. \par  [de-identified] : 4 views of bilateral knees obtained the office today show no acute fracture or dislocation.  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

## 2022-08-15 ENCOUNTER — APPOINTMENT (OUTPATIENT)
Dept: ORTHOPEDIC SURGERY | Facility: CLINIC | Age: 62
End: 2022-08-15

## 2022-09-07 ENCOUNTER — APPOINTMENT (OUTPATIENT)
Dept: ORTHOPEDIC SURGERY | Facility: CLINIC | Age: 62
End: 2022-09-07

## 2022-09-07 VITALS
HEIGHT: 60 IN | HEART RATE: 76 BPM | SYSTOLIC BLOOD PRESSURE: 154 MMHG | BODY MASS INDEX: 49.08 KG/M2 | WEIGHT: 250 LBS | DIASTOLIC BLOOD PRESSURE: 79 MMHG

## 2022-09-07 DIAGNOSIS — M17.0 BILATERAL PRIMARY OSTEOARTHRITIS OF KNEE: ICD-10-CM

## 2022-09-07 PROCEDURE — 99214 OFFICE O/P EST MOD 30 MIN: CPT

## 2022-09-07 PROCEDURE — 73562 X-RAY EXAM OF KNEE 3: CPT | Mod: LT

## 2022-09-07 PROCEDURE — 73030 X-RAY EXAM OF SHOULDER: CPT | Mod: LT

## 2022-09-07 NOTE — DISCUSSION/SUMMARY
[Medication Risks Reviewed] : Medication risks reviewed [Surgical risks reviewed] : Surgical risks reviewed [de-identified] : Patient is a 62-year-old female with bilateral knee osteoarthritis that is greatly improved physical therapy and injections.  She would like to continue with conservative treatment at this time I think that is warranted.  As for her left shoulder she is having significant pain and dysfunction and has severely limited range of motion.  I am concerned that she has an acute rotator cuff injury as she did have a fall few days ago.  I have therefore ordered an MRI of her left shoulder for further evaluation and management.  I recommended that she perform at home pendulum exercises.  I will see her back after the MRI for repeat evaluation management.  All questions were asked and answered

## 2022-09-07 NOTE — REASON FOR VISIT
[Follow-Up Visit] : a follow-up visit for [[employer] ] : provided by [employer]   [FreeTextEntry2] : bilateral knee pain, left shoulder injured 9/6/22 as well as knees  [Interpreters_IDNumber] : [id number] [Interpreters_FullName] : cal [TWNoteComboBox1] : [place]

## 2022-09-07 NOTE — PHYSICAL EXAM
[de-identified] : Musculoskeletal:. Left knee exam shows no effusion, ROM is 0-1 20 degrees, no instability, no pain with Mike, medial joint line tenderness. \par Right knee exam shows no effusion, ROM is 0-1 20 degrees, no instability, no pain with Mike, medial joint line tenderness. \par Left upper extremity: There is tenderness palpation over the proximal humerus, range of motion is full passive range of motion active range of motion limited to 70 degrees, there is severe pain with external rotation, there is a floridly positive decant, positive cross chest, and PIN median radial ulnar nerves intact, fingers warm appears brisk cap refill\par 5/5 motor strength in bilateral lower extremities. Sensory: Intact in bilateral lower extremities. DTRs: Biceps, brachioradialis, triceps, patellar, ankle and plantar 2+ and symmetric bilaterally. Pulses: dorsalis pedis, posterior tibial, femoral, popliteal, and radial 2+ and symmetric bilaterally. \par  [de-identified] : 4 views left shoulder obtained the office today show no acute fracture or dislocation.  No significant degenerative changes noted.\par \par 3 views of bilateral knees obtained the office today show no acute fracture or dislocation.  There is severe medial joint space narrowing osteophyte formation subchondral sclerosis consistent with Kellgren-Mehrdad grade 2 3 changes.

## 2022-09-07 NOTE — HISTORY OF PRESENT ILLNESS
[de-identified] : 62-year-old female who presents office today for follow-up for right knee pain following gel injections and physical therapy.  The patient states that her right knee feels much better since the gel injections.  She states that physical therapy has been working and she is very happy with the progression of her pain and treatment.  She is also complaining of left shoulder pain today as she states that last night when she got up to use the bathroom she tripped over a rug and fell directly onto her left shoulder.  She took Tylenol immediately after the fall with good relief of pain and was able to go back to sleep, but this morning the pain has worsened and she is unable to lift her arm.  She denies swelling or bruising, numbness/tingling.

## 2022-09-22 ENCOUNTER — APPOINTMENT (OUTPATIENT)
Dept: INTERNAL MEDICINE | Facility: CLINIC | Age: 62
End: 2022-09-22

## 2022-09-22 VITALS
HEART RATE: 79 BPM | DIASTOLIC BLOOD PRESSURE: 78 MMHG | TEMPERATURE: 97.7 F | WEIGHT: 247 LBS | HEIGHT: 60 IN | BODY MASS INDEX: 48.49 KG/M2 | RESPIRATION RATE: 16 BRPM | OXYGEN SATURATION: 98 % | SYSTOLIC BLOOD PRESSURE: 130 MMHG

## 2022-09-22 DIAGNOSIS — M25.561 PAIN IN RIGHT KNEE: ICD-10-CM

## 2022-09-22 DIAGNOSIS — M25.512 PAIN IN LEFT SHOULDER: ICD-10-CM

## 2022-09-22 DIAGNOSIS — R19.7 DIARRHEA, UNSPECIFIED: ICD-10-CM

## 2022-09-22 DIAGNOSIS — R91.8 OTHER NONSPECIFIC ABNORMAL FINDING OF LUNG FIELD: ICD-10-CM

## 2022-09-22 DIAGNOSIS — M25.562 PAIN IN RIGHT KNEE: ICD-10-CM

## 2022-09-22 PROCEDURE — 99214 OFFICE O/P EST MOD 30 MIN: CPT

## 2022-09-22 RX ORDER — NITROFURANTOIN (MONOHYDRATE/MACROCRYSTALS) 25; 75 MG/1; MG/1
100 CAPSULE ORAL
Qty: 14 | Refills: 0 | Status: DISCONTINUED | COMMUNITY
Start: 2022-05-11 | End: 2022-09-22

## 2022-09-22 NOTE — PHYSICAL EXAM
[No Acute Distress] : no acute distress [Well-Appearing] : well-appearing [Normal Voice/Communication] : normal voice/communication [Normal Sclera/Conjunctiva] : normal sclera/conjunctiva [PERRL] : pupils equal round and reactive to light [Normal Oropharynx] : the oropharynx was normal [Normal] : normal rate, regular rhythm, normal S1 and S2 and no murmur heard [No Edema] : there was no peripheral edema [Soft] : abdomen soft [Non Tender] : non-tender [Non-distended] : non-distended [No Masses] : no abdominal mass palpated [No HSM] : no HSM [Normal Bowel Sounds] : normal bowel sounds [Normal Affect] : the affect was normal [Alert and Oriented x3] : oriented to person, place, and time [Normal Mood] : the mood was normal [Normal Insight/Judgement] : insight and judgment were intact [No Rash] : no rash [No Focal Deficits] : no focal deficits [de-identified] : Obese [de-identified] : Left shoulder with no swelling.  She has decreased range of motion of the left arm in all directions

## 2022-09-22 NOTE — REVIEW OF SYSTEMS
[Negative] : Psychiatric [Cough] : cough [Fever] : no fever [Chills] : no chills [Fatigue] : no fatigue [Chest Pain] : no chest pain [Palpitations] : no palpitations [Lower Ext Edema] : no lower extremity edema [Shortness Of Breath] : no shortness of breath [Wheezing] : no wheezing [Dyspnea on Exertion] : no dyspnea on exertion [Abdominal Pain] : no abdominal pain [Nausea] : no nausea [Vomiting] : no vomiting [Skin Rash] : no skin rash [Anxiety] : no anxiety [Depression] : no depression [FreeTextEntry7] : see HPI [FreeTextEntry9] : see HPI

## 2022-09-22 NOTE — HISTORY OF PRESENT ILLNESS
[de-identified] : July is here today for follow-up of hypertension\par \par She reports over the last 4 days she has had a productive cough.  She states she feels like she has a lot of phlegm in her chest.  She denies any ear pain, nasal congestion, sore throat, chest pain, shortness of breath, palpitations, wheezing.  No sick contacts reported\par \par She also reports a few weeks ago she fell on an outstretched left arm.  She states since then she has had left shoulder pain and decreased motion of left arm.  She states she was seen by orthopedics who did a left shoulder x-ray which was reported as normal.  She states orthopedics has ordered an MRI of left shoulder which she has scheduled for this week.\par \par She reports she was recently seen by GI for her chronic mild diarrhea.  She states she had an EGD and colonoscopy this week.  She does not know results\par \par She also reports that she did see cardiologist and states she had work-up including stress test which she states was normal

## 2022-09-22 NOTE — HEALTH RISK ASSESSMENT
[0] : 2) Feeling down, depressed, or hopeless: Not at all (0) [PHQ-2 Negative - No further assessment needed] : PHQ-2 Negative - No further assessment needed [[id number]] : 0

## 2022-09-22 NOTE — ASSESSMENT
[FreeTextEntry1] : \par Left shoulder pain/decreased range of motion\par -She was seen by orthopedics and x-ray was negative for fracture\par -She is scheduled for MRI of the shoulder this week\par -She will follow-up with orthopedics after MRI\par \par Cough x4 days\par -Check COVID PCR test–collected in office today\par -I will treat with Z-Isaak\par -Check chest x-ray\par -She is to notify office if symptoms persist or worsen\par \par Intermittent diarrhea\par -No symptoms reported today\par -Stool studies ordered previously\par -She reports she was seen by GI and had EGD and colonoscopy-I will try to obtain consult note and copies of recent EGD and colonoscopy reports for review\par -She should follow-up with GI\par \par Depression/psychosis:\par -She is followed by therapist/psychiatrist\par -on Lexapro 10 mg daily and perphenazine 4 mg twice daily\par -She reports she is doing well and continues to follow-up with psychiatrist\par \par Vitamin B12 deficiency:\par -oral vitamin B12 1000 mcg at home\par -Check B12 level\par \par HTN:\par -BP at goal today\par -amlodipine 10mg PO daily\par \par Chronic B/L knee pain:\par -She was recently seen by orthopedics and physical therapy advised.  She states she has been participating in physical therapy which is helping\par \par Lung mass/nodule:\par -CXR revealed hilar mass\par -previous CT chest revealed 1.7 x 1.5 lingular nodule possible hamartoma\par -She was seen by thoracic sx- to Dr Pa who ordered a PET CT which showed lung mass/nodule with minimal FGD activity which was indeterminate\par -She had repeat CT chest 2022 which showed 1.8 x 1.6 cm endobronchial; nodule in lingula possible carcinoid tumor\par -I have again advised today 2022 that she schedule follow-up appointment with Dr. Pa.  She states this is the only specialist she has not had a time to make an appointment with.  I again advised importance of making a follow-up appointment to see Dr. Pa's for follow-up of lung mass/nodule\par -Check chest x-ray given cough\par \par SEEMA:\par -she has been referred to pulmonary previously-she states she refuses to use machine\par \par Morbid Obesity/pre-DM: \par -Fasting labs ordered\par -Weight loss advised\par \par Abnormal EKG\par -She remains asymptomatic from the cardiac standpoint\par -She reports she did see cardiology and states she had testing done which was negative/normal\par -I will try to obtain cardiology consult note\par \par Vitamin D deficiency:\par -OTC vitamin D3 1000 units once a day\par -vitamin D 25 OH was normal 2022\par \par Hypertriglyceridemia:\par -check fasting lipids\par \par \par HCM:\par \par CPE: 11/10/2021\par \par Depression screenin2022 PHQ 2 score 0\par \par EK/10/2021\par \par Flu shot: advised 2022-she declined\par \par Tdap:  10/31/2019\par \par Shingles vaccine: Advised previously\par \par Covid vaccine ([employer]ett [employer] Emm[employer]) 2021-J+J booster\par \par HIV testing: negative 10/2019-offered 11/10/2021-she declined\par \par Hepatitis C screening: negative 10/2019\par \par Colonoscopy: 2018--had one hyperplastic polyp and one tubular adenoma-she reports she had repeat colonoscopy 2022.  We will try to obtain report\par \par Mammogram: 2021 BR 1-mammogram ordered again today 2022\par \par GYN/PAP:  2022 \par \par DEXA: 3/2020 normal\par \par QuantiFERON-TB test: 2020 negative\par \par F/U  3 months.  Fasting labs ordered which she will have done at the lab

## 2022-09-23 LAB — SARS-COV-2 N GENE NPH QL NAA+PROBE: NOT DETECTED

## 2022-10-14 ENCOUNTER — APPOINTMENT (OUTPATIENT)
Dept: MRI IMAGING | Facility: CLINIC | Age: 62
End: 2022-10-14

## 2022-10-17 ENCOUNTER — RESULT REVIEW (OUTPATIENT)
Age: 62
End: 2022-10-17

## 2022-10-17 ENCOUNTER — APPOINTMENT (OUTPATIENT)
Dept: MAMMOGRAPHY | Facility: CLINIC | Age: 62
End: 2022-10-17

## 2022-10-17 ENCOUNTER — OUTPATIENT (OUTPATIENT)
Dept: OUTPATIENT SERVICES | Facility: HOSPITAL | Age: 62
LOS: 1 days | End: 2022-10-17
Payer: MEDICAID

## 2022-10-17 ENCOUNTER — APPOINTMENT (OUTPATIENT)
Dept: RADIOLOGY | Facility: CLINIC | Age: 62
End: 2022-10-17

## 2022-10-17 DIAGNOSIS — Z00.8 ENCOUNTER FOR OTHER GENERAL EXAMINATION: ICD-10-CM

## 2022-10-17 DIAGNOSIS — Z98.89 OTHER SPECIFIED POSTPROCEDURAL STATES: Chronic | ICD-10-CM

## 2022-10-17 PROCEDURE — 71046 X-RAY EXAM CHEST 2 VIEWS: CPT

## 2022-10-17 PROCEDURE — 77063 BREAST TOMOSYNTHESIS BI: CPT | Mod: 26

## 2022-10-17 PROCEDURE — 77063 BREAST TOMOSYNTHESIS BI: CPT

## 2022-10-17 PROCEDURE — 77067 SCR MAMMO BI INCL CAD: CPT | Mod: 26

## 2022-10-17 PROCEDURE — 77067 SCR MAMMO BI INCL CAD: CPT

## 2022-10-17 PROCEDURE — 71046 X-RAY EXAM CHEST 2 VIEWS: CPT | Mod: 26

## 2022-10-24 ENCOUNTER — NON-APPOINTMENT (OUTPATIENT)
Age: 62
End: 2022-10-24

## 2022-10-27 ENCOUNTER — APPOINTMENT (OUTPATIENT)
Dept: INTERNAL MEDICINE | Facility: CLINIC | Age: 62
End: 2022-10-27

## 2022-10-27 ENCOUNTER — RX CHANGE (OUTPATIENT)
Age: 62
End: 2022-10-27

## 2022-10-27 VITALS
DIASTOLIC BLOOD PRESSURE: 75 MMHG | BODY MASS INDEX: 48.69 KG/M2 | WEIGHT: 248 LBS | SYSTOLIC BLOOD PRESSURE: 114 MMHG | TEMPERATURE: 97.8 F | HEIGHT: 60 IN | OXYGEN SATURATION: 98 % | HEART RATE: 74 BPM | RESPIRATION RATE: 16 BRPM

## 2022-10-27 DIAGNOSIS — R09.89 OTHER SPECIFIED SYMPTOMS AND SIGNS INVOLVING THE CIRCULATORY AND RESPIRATORY SYSTEMS: ICD-10-CM

## 2022-10-27 DIAGNOSIS — J40 BRONCHITIS, NOT SPECIFIED AS ACUTE OR CHRONIC: ICD-10-CM

## 2022-10-27 PROCEDURE — 99213 OFFICE O/P EST LOW 20 MIN: CPT

## 2022-10-27 RX ORDER — AMOXICILLIN 500 MG/1
500 TABLET, FILM COATED ORAL
Qty: 14 | Refills: 0 | Status: DISCONTINUED | COMMUNITY
Start: 2022-10-27 | End: 2022-10-27

## 2022-10-27 RX ORDER — AZITHROMYCIN 250 MG/1
250 TABLET, FILM COATED ORAL
Qty: 6 | Refills: 0 | Status: DISCONTINUED | COMMUNITY
Start: 2022-09-22 | End: 2022-10-27

## 2022-10-28 NOTE — PHYSICAL EXAM
[Normal] : no acute distress, well nourished, well developed and well-appearing [de-identified] : +scattered wheezing in left lower lobe of lung

## 2022-10-28 NOTE — ASSESSMENT
[FreeTextEntry1] : persistent cough and chest congestion.\par I reviewed the chest xr from 10/17/22 and CT chest .\par discussed with pt. advised her to f/u with the pulmonologist.\par Prescribed amoxicillin 500 mg twice a day for 7 days and Promethazine DM cough syrup 5 to 10 mL every 6 hours as needed for cough.\par Also prescribed Medrol Dosepak tapering dose for 7 days.\par \par Hypertension: Blood pressure is in goal.  She is on amlodipine 10 mg once a day.  Advised low-salt diet and exercise.\par \par Depression/psychosis:\par She sees psychiatrist and psychologist.  Currently on Lexapro 10 mg once a day and perphenazine 4 mg twice a day.  Her symptoms are stable with current medication.\par \par She will follow-up with Dr. Kearney.

## 2022-10-28 NOTE — HISTORY OF PRESENT ILLNESS
[FreeTextEntry8] : Ms. SONIA BARNETT  is    62 year female . \par She is a pt. of Dr Kearney.\par She presents today for persistent cough since last month , she had seen Dr Kearney on 09/22/22 for cough and URI , she was prescribed z-pack, chest x ray was ordered.\par she finished the antibiotic , her symptoms are not better.\par she continuos to cough , feels chest tightness.\par \par \par \par

## 2022-10-31 ENCOUNTER — RX CHANGE (OUTPATIENT)
Age: 62
End: 2022-10-31

## 2022-12-27 ENCOUNTER — APPOINTMENT (OUTPATIENT)
Dept: INTERNAL MEDICINE | Facility: CLINIC | Age: 62
End: 2022-12-27

## 2023-06-22 NOTE — ED ADULT NURSE NOTE - NS ED NOTE  TALK SOMEONE YN
Apixaban/Eliquis increases your risk for bleeding. Notify your doctor if you experience any of the following side effects: bleeding, coughing or vomiting blood, red or black stool, unexpected pain or swelling, itching or hives, chest pain, chest tightness, trouble breathing, changes in how much or how often you urinate, red or pink urine, numbness or tingling in your feet, or unusual muscle weakness. When Apixaban/Eliquis is taken with other medicines, they can affect how it works. Taking other medications such as aspirin, blood thinners, nonsteroidal anti-inflammatories, and medications that treat depression can increase your risk of bleeding. It is very important to tell your health care provider about all of the other medicines, including over-the-counter medications, herbs, and vitamins you are taking. DO NOT start, stop, or change the dosage of any medicine, including over-the-counter medicines, vitamins, and herbal products without your doctor’s approval. Any products containing aspirin or are nonsteroidal anti-inflammatories lessen the blood’s ability to form clots and add to the effect of Apixaban/Eliquis. Never take aspirin or medicines that contain aspirin without speaking to your doctor. No

## 2023-08-03 ENCOUNTER — APPOINTMENT (OUTPATIENT)
Dept: INTERNAL MEDICINE | Facility: CLINIC | Age: 63
End: 2023-08-03
Payer: MEDICAID

## 2023-08-03 VITALS
OXYGEN SATURATION: 98 % | HEIGHT: 60 IN | SYSTOLIC BLOOD PRESSURE: 134 MMHG | BODY MASS INDEX: 50.26 KG/M2 | DIASTOLIC BLOOD PRESSURE: 70 MMHG | RESPIRATION RATE: 16 BRPM | HEART RATE: 68 BPM | WEIGHT: 256 LBS | TEMPERATURE: 97.7 F

## 2023-08-03 DIAGNOSIS — R32 UNSPECIFIED URINARY INCONTINENCE: ICD-10-CM

## 2023-08-03 DIAGNOSIS — E55.9 VITAMIN D DEFICIENCY, UNSPECIFIED: ICD-10-CM

## 2023-08-03 DIAGNOSIS — E78.1 PURE HYPERGLYCERIDEMIA: ICD-10-CM

## 2023-08-03 DIAGNOSIS — L30.9 DERMATITIS, UNSPECIFIED: ICD-10-CM

## 2023-08-03 DIAGNOSIS — E53.8 DEFICIENCY OF OTHER SPECIFIED B GROUP VITAMINS: ICD-10-CM

## 2023-08-03 PROCEDURE — 99214 OFFICE O/P EST MOD 30 MIN: CPT

## 2023-08-03 RX ORDER — PROMETHAZINE HYDROCHLORIDE AND DEXTROMETHORPHAN HYDROBROMIDE ORAL SOLUTION 15; 6.25 MG/5ML; MG/5ML
6.25-15 SOLUTION ORAL EVERY 6 HOURS
Qty: 120 | Refills: 0 | Status: DISCONTINUED | COMMUNITY
Start: 2022-10-27 | End: 2023-08-03

## 2023-08-03 RX ORDER — METHYLPREDNISOLONE 4 MG/1
4 TABLET ORAL
Qty: 1 | Refills: 0 | Status: DISCONTINUED | COMMUNITY
Start: 2022-10-27 | End: 2023-08-03

## 2023-08-03 RX ORDER — AMOXICILLIN 500 MG/1
500 CAPSULE ORAL
Qty: 14 | Refills: 0 | Status: DISCONTINUED | COMMUNITY
Start: 2022-10-27 | End: 2023-08-03

## 2023-08-03 NOTE — ASSESSMENT
[FreeTextEntry1] :  Urinary incontinence -Check labs including urine studies -I will refer her to a urogynecologist  Depression/psychosis: -She is followed by therapist/psychiatrist -perphenazine 4 mg twice daily -she thank you continues to follow-up with psychiatrist  Vitamin B12 deficiency: -Check B12 level  HTN: -BP near goal today -amlodipine 10mg PO daily -I advised low fat/low cholesterol diet, low salt diet, and weight loss  Unsteady gait -she will find out when type of grab bar she needs and will let me know so I can write a prescription  Lung mass/nodule: -CXR revealed hilar mass -previous CT chest revealed 1.7 x 1.5 lingular nodule possible hamartoma -She was seen by thoracic sx- to Dr Pa who ordered a PET CT which showed lung mass/nodule with minimal FGD activity which was indeterminate -She had repeat CT chest 2022 which showed 1.8 x 1.6 cm endobronchial; nodule in lingula possible carcinoid tumor -She had a chest x-ray 10/2022 which showed a 1.6 cm perihilar lung nodule -I have advised her to follow-up with Dr. Pa her thoracic surgeon on multiple occasions but she has been noncompliant with follow-up.  I again advised her of the importance of follow-up and I gave her referral again today 8/3/2023 -I will order a follow-up CT chest today  SEEMA: -She refuses CPAP  Morbid Obesity/pre-DM:  -Fasting labs ordered -Weight loss advised -I have advised low-fat, low-cholesterol, low carbohydrate diet and weight loss advised -She will follow-up in 6 to 8 weeks.  If she has not lost any weight may consider trial of Wegovy  Abnormal EKG -She remains asymptomatic from the cardiac standpoint -She states she completed cardiac work-up and it was negative  Vitamin D deficiency: -Check vitamin D 25 OH  Hypertriglyceridemia: -check fasting lipids  Intermittent dermatitis underneath bilateral breasts -She states she is currently asymptomatic -I will give trial of nystatin powder twice daily as needed -Weight loss advised -She is to notify office if symptoms persist or worsen   HCM:  CPE: 11/10/2021  Depression screenin/3/2023 PHQ 2 score 0  EK/10/2021  Flu shot: advised 2022-she declined  Tdap:  10/31/2019  Shingles vaccine: Advised previously  Covid vaccine ([employer] Emm[employer]) 2021-J+J booster  HIV testing: negative 10/2019-offered 8/3/2023-she declined  Hepatitis C screening: negative 10/2019  Colonoscopy: 2018--had one hyperplastic polyp and one tubular adenoma-she reports she had repeat colonoscopy 2022.  I will again try to get colonoscopy report  Mammogram: She reports she has appointment for annual mammogram 10/2023.  She states she got referral from GYN already  GYN/PAP:  2023  DEXA: 3/2020 normal  QuantiFERON-TB test: 2020 negative  F/U  6-8 weeks.  Fasting labs ordered which she will have done at the lab

## 2023-08-03 NOTE — PHYSICAL EXAM
[No Acute Distress] : no acute distress [Well-Appearing] : well-appearing [Normal Voice/Communication] : normal voice/communication [Normal Sclera/Conjunctiva] : normal sclera/conjunctiva [PERRL] : pupils equal round and reactive to light [Normal Oropharynx] : the oropharynx was normal [Normal] : normal rate, regular rhythm, normal S1 and S2 and no murmur heard [No Edema] : there was no peripheral edema [Soft] : abdomen soft [Non Tender] : non-tender [Non-distended] : non-distended [No Masses] : no abdominal mass palpated [No HSM] : no HSM [Normal Bowel Sounds] : normal bowel sounds [No Rash] : no rash [No Focal Deficits] : no focal deficits [Normal Affect] : the affect was normal [Alert and Oriented x3] : oriented to person, place, and time [Normal Mood] : the mood was normal [Normal Insight/Judgement] : insight and judgment were intact [No Spinal Tenderness] : no spinal tenderness [de-identified] : Obese

## 2023-08-03 NOTE — HISTORY OF PRESENT ILLNESS
[de-identified] : Here today for follow-up of hyperlipidemia, lung nodule, hypertension  She was previously referred to thoracic surgery for follow-up of 1.6 cm perihilar and lung nodule but she never made appointment.  She states her daughter has been unable to bring her because she is busy.  She states she wants referral again and plans to make an appointment  She reports every once in a while she gets an itchy rash underneath bilateral breasts.  She was previously prescribed nystatin cream but she states this does not help that much.  She states she is currently asymptomatic.  She also reports that for the past 6 months she sometimes loses urine trying to get to the bathroom.  She states when she gets the urge to urinate she has to go really fast or otherwise she has an accident.  She denies any urinary incontinence with coughing or sneezing.  She denies any burning on urination.  She denies any abdominal pain.  She reports she is interested in getting grab bars for her bathroom to assist her with mobility.  She wanted to know if she can have prescriptions for these.

## 2023-08-03 NOTE — REVIEW OF SYSTEMS
[Cough] : cough [Fever] : no fever [Chills] : no chills [Fatigue] : no fatigue [Chest Pain] : no chest pain [Palpitations] : no palpitations [Lower Ext Edema] : no lower extremity edema [Wheezing] : no wheezing [Dyspnea on Exertion] : no dyspnea on exertion [Abdominal Pain] : no abdominal pain [Nausea] : no nausea [Diarrhea] : diarrhea [Vomiting] : no vomiting [Suicidal] : not suicidal [Anxiety] : no anxiety [Depression] : no depression [Negative] : ENT [FreeTextEntry8] : see HPI

## 2023-08-03 NOTE — HEALTH RISK ASSESSMENT
[0] : 2) Feeling down, depressed, or hopeless: Not at all (0) [PHQ-2 Negative - No further assessment needed] : PHQ-2 Negative - No further assessment needed [[id number]] : 0 [Never] : Never

## 2023-08-11 DIAGNOSIS — R79.89 OTHER SPECIFIED ABNORMAL FINDINGS OF BLOOD CHEMISTRY: ICD-10-CM

## 2023-08-15 ENCOUNTER — APPOINTMENT (OUTPATIENT)
Dept: CT IMAGING | Facility: CLINIC | Age: 63
End: 2023-08-15
Payer: MEDICAID

## 2023-08-15 ENCOUNTER — OUTPATIENT (OUTPATIENT)
Dept: OUTPATIENT SERVICES | Facility: HOSPITAL | Age: 63
LOS: 1 days | End: 2023-08-15
Payer: MEDICAID

## 2023-08-15 DIAGNOSIS — R91.1 SOLITARY PULMONARY NODULE: ICD-10-CM

## 2023-08-15 DIAGNOSIS — Z98.89 OTHER SPECIFIED POSTPROCEDURAL STATES: Chronic | ICD-10-CM

## 2023-08-15 PROCEDURE — 71250 CT THORAX DX C-: CPT

## 2023-08-15 PROCEDURE — 71250 CT THORAX DX C-: CPT | Mod: 26

## 2023-08-17 PROBLEM — R79.89 ELEVATED LFTS: Status: ACTIVE | Noted: 2023-08-17

## 2023-10-15 ENCOUNTER — NON-APPOINTMENT (OUTPATIENT)
Age: 63
End: 2023-10-15

## 2023-10-18 ENCOUNTER — APPOINTMENT (OUTPATIENT)
Dept: MAMMOGRAPHY | Facility: CLINIC | Age: 63
End: 2023-10-18

## 2023-10-19 ENCOUNTER — APPOINTMENT (OUTPATIENT)
Dept: INTERNAL MEDICINE | Facility: CLINIC | Age: 63
End: 2023-10-19

## 2023-10-25 ENCOUNTER — APPOINTMENT (OUTPATIENT)
Dept: INTERNAL MEDICINE | Facility: CLINIC | Age: 63
End: 2023-10-25
Payer: MEDICAID

## 2023-10-25 VITALS
OXYGEN SATURATION: 98 % | TEMPERATURE: 98.1 F | DIASTOLIC BLOOD PRESSURE: 80 MMHG | WEIGHT: 254 LBS | HEART RATE: 69 BPM | BODY MASS INDEX: 49.87 KG/M2 | HEIGHT: 60 IN | SYSTOLIC BLOOD PRESSURE: 126 MMHG | RESPIRATION RATE: 16 BRPM

## 2023-10-25 DIAGNOSIS — U07.1 COVID-19: ICD-10-CM

## 2023-10-25 DIAGNOSIS — R79.89 OTHER SPECIFIED ABNORMAL FINDINGS OF BLOOD CHEMISTRY: ICD-10-CM

## 2023-10-25 PROCEDURE — 99214 OFFICE O/P EST MOD 30 MIN: CPT | Mod: 25

## 2023-10-25 RX ORDER — ESCITALOPRAM OXALATE 10 MG/1
10 TABLET ORAL
Qty: 30 | Refills: 0 | Status: ACTIVE | COMMUNITY
Start: 2023-07-07

## 2023-10-27 ENCOUNTER — NON-APPOINTMENT (OUTPATIENT)
Age: 63
End: 2023-10-27

## 2023-10-28 PROBLEM — R79.89 ELEVATED TSH: Status: ACTIVE | Noted: 2019-12-05

## 2023-10-28 PROBLEM — U07.1 COVID-19: Status: ACTIVE | Noted: 2021-12-15

## 2023-10-29 LAB
ALBUMIN SERPL ELPH-MCNC: 4.2 G/DL
ALP BLD-CCNC: 121 U/L
ALT SERPL-CCNC: 33 U/L
ANION GAP SERPL CALC-SCNC: 14 MMOL/L
APPEARANCE: CLEAR
AST SERPL-CCNC: 45 U/L
BACTERIA: NEGATIVE /HPF
BASOPHILS # BLD AUTO: 0.05 K/UL
BASOPHILS NFR BLD AUTO: 0.7 %
BILIRUB SERPL-MCNC: 0.5 MG/DL
BILIRUBIN URINE: NEGATIVE
BLOOD URINE: NEGATIVE
BUN SERPL-MCNC: 11 MG/DL
CALCIUM SERPL-MCNC: 9.3 MG/DL
CAST: 0 /LPF
CHLORIDE SERPL-SCNC: 105 MMOL/L
CO2 SERPL-SCNC: 23 MMOL/L
COLOR: NORMAL
CREAT SERPL-MCNC: 0.66 MG/DL
CREAT SPEC-SCNC: 303 MG/DL
CREAT/PROT UR: 0.1 RATIO
EGFR: 97 ML/MIN/1.73M2
EOSINOPHIL # BLD AUTO: 0.18 K/UL
EOSINOPHIL NFR BLD AUTO: 2.6 %
EPITHELIAL CELLS: 12 /HPF
ESTIMATED AVERAGE GLUCOSE: 131 MG/DL
GLUCOSE QUALITATIVE U: NEGATIVE MG/DL
GLUCOSE SERPL-MCNC: 148 MG/DL
HBA1C MFR BLD HPLC: 6.2 %
HCT VFR BLD CALC: 43.1 %
HGB BLD-MCNC: 13.8 G/DL
IMM GRANULOCYTES NFR BLD AUTO: 0.4 %
KETONES URINE: NEGATIVE MG/DL
LEUKOCYTE ESTERASE URINE: ABNORMAL
LYMPHOCYTES # BLD AUTO: 2.14 K/UL
LYMPHOCYTES NFR BLD AUTO: 31.2 %
MAN DIFF?: NORMAL
MCHC RBC-ENTMCNC: 28 PG
MCHC RBC-ENTMCNC: 32 GM/DL
MCV RBC AUTO: 87.4 FL
MICROSCOPIC-UA: NORMAL
MONOCYTES # BLD AUTO: 0.36 K/UL
MONOCYTES NFR BLD AUTO: 5.3 %
NEUTROPHILS # BLD AUTO: 4.09 K/UL
NEUTROPHILS NFR BLD AUTO: 59.8 %
NITRITE URINE: NEGATIVE
PH URINE: 5.5
PLATELET # BLD AUTO: 262 K/UL
POTASSIUM SERPL-SCNC: 4.1 MMOL/L
PROT SERPL-MCNC: 7 G/DL
PROT UR-MCNC: 29 MG/DL
PROTEIN URINE: NORMAL MG/DL
RBC # BLD: 4.93 M/UL
RBC # FLD: 13.4 %
RED BLOOD CELLS URINE: 2 /HPF
REVIEW: NORMAL
SODIUM SERPL-SCNC: 142 MMOL/L
SPECIFIC GRAVITY URINE: 1.03
T4 FREE SERPL-MCNC: 1.2 NG/DL
TSH SERPL-ACNC: 3.69 UIU/ML
UROBILINOGEN URINE: 1 MG/DL
VIT B12 SERPL-MCNC: 811 PG/ML
WBC # FLD AUTO: 6.85 K/UL
WHITE BLOOD CELLS URINE: 2 /HPF

## 2023-11-09 ENCOUNTER — APPOINTMENT (OUTPATIENT)
Dept: RADIOLOGY | Facility: CLINIC | Age: 63
End: 2023-11-09

## 2023-11-29 ENCOUNTER — APPOINTMENT (OUTPATIENT)
Dept: RADIOLOGY | Facility: CLINIC | Age: 63
End: 2023-11-29

## 2023-11-29 ENCOUNTER — APPOINTMENT (OUTPATIENT)
Dept: MAMMOGRAPHY | Facility: CLINIC | Age: 63
End: 2023-11-29

## 2023-12-06 ENCOUNTER — LABORATORY RESULT (OUTPATIENT)
Age: 63
End: 2023-12-06

## 2023-12-06 ENCOUNTER — APPOINTMENT (OUTPATIENT)
Dept: UROLOGY | Facility: CLINIC | Age: 63
End: 2023-12-06
Payer: MEDICAID

## 2023-12-06 VITALS — HEART RATE: 77 BPM | SYSTOLIC BLOOD PRESSURE: 142 MMHG | DIASTOLIC BLOOD PRESSURE: 84 MMHG

## 2023-12-06 PROCEDURE — 99204 OFFICE O/P NEW MOD 45 MIN: CPT

## 2023-12-07 ENCOUNTER — RX CHANGE (OUTPATIENT)
Age: 63
End: 2023-12-07

## 2023-12-07 LAB
APPEARANCE: CLEAR
BILIRUBIN URINE: NEGATIVE
BLOOD URINE: NEGATIVE
COLOR: YELLOW
GLUCOSE QUALITATIVE U: NEGATIVE MG/DL
KETONES URINE: NEGATIVE MG/DL
LEUKOCYTE ESTERASE URINE: ABNORMAL
NITRITE URINE: NEGATIVE
PH URINE: 6
PROTEIN URINE: NEGATIVE MG/DL
SPECIFIC GRAVITY URINE: 1.02
UROBILINOGEN URINE: 0.2 MG/DL

## 2023-12-07 RX ORDER — AMLODIPINE BESYLATE 10 MG/1
10 TABLET ORAL
Qty: 90 | Refills: 1 | Status: ACTIVE | COMMUNITY
Start: 2019-04-05 | End: 1900-01-01

## 2023-12-11 ENCOUNTER — RX CHANGE (OUTPATIENT)
Age: 63
End: 2023-12-11

## 2023-12-11 ENCOUNTER — RX RENEWAL (OUTPATIENT)
Age: 63
End: 2023-12-11

## 2023-12-11 DIAGNOSIS — N39.0 URINARY TRACT INFECTION, SITE NOT SPECIFIED: ICD-10-CM

## 2024-01-05 ENCOUNTER — RESULT REVIEW (OUTPATIENT)
Age: 64
End: 2024-01-05

## 2024-01-05 ENCOUNTER — APPOINTMENT (OUTPATIENT)
Dept: RADIOLOGY | Facility: CLINIC | Age: 64
End: 2024-01-05
Payer: MEDICARE

## 2024-01-05 ENCOUNTER — OUTPATIENT (OUTPATIENT)
Dept: OUTPATIENT SERVICES | Facility: HOSPITAL | Age: 64
LOS: 1 days | End: 2024-01-05
Payer: MEDICARE

## 2024-01-05 ENCOUNTER — APPOINTMENT (OUTPATIENT)
Dept: MAMMOGRAPHY | Facility: CLINIC | Age: 64
End: 2024-01-05
Payer: MEDICARE

## 2024-01-05 DIAGNOSIS — Z00.8 ENCOUNTER FOR OTHER GENERAL EXAMINATION: ICD-10-CM

## 2024-01-05 DIAGNOSIS — Z00.00 ENCOUNTER FOR GENERAL ADULT MEDICAL EXAMINATION WITHOUT ABNORMAL FINDINGS: ICD-10-CM

## 2024-01-05 DIAGNOSIS — Z98.89 OTHER SPECIFIED POSTPROCEDURAL STATES: Chronic | ICD-10-CM

## 2024-01-05 PROCEDURE — 77085 DXA BONE DENSITY AXL VRT FX: CPT | Mod: 26

## 2024-01-05 PROCEDURE — 77085 DXA BONE DENSITY AXL VRT FX: CPT

## 2024-01-05 PROCEDURE — 77067 SCR MAMMO BI INCL CAD: CPT | Mod: 26

## 2024-01-05 PROCEDURE — 77063 BREAST TOMOSYNTHESIS BI: CPT | Mod: 26

## 2024-01-05 PROCEDURE — 77067 SCR MAMMO BI INCL CAD: CPT

## 2024-01-05 PROCEDURE — 77063 BREAST TOMOSYNTHESIS BI: CPT

## 2024-01-08 ENCOUNTER — APPOINTMENT (OUTPATIENT)
Dept: THORACIC SURGERY | Facility: CLINIC | Age: 64
End: 2024-01-08

## 2024-01-08 ENCOUNTER — APPOINTMENT (OUTPATIENT)
Dept: THORACIC SURGERY | Facility: CLINIC | Age: 64
End: 2024-01-08
Payer: MEDICARE

## 2024-01-22 ENCOUNTER — APPOINTMENT (OUTPATIENT)
Dept: THORACIC SURGERY | Facility: CLINIC | Age: 64
End: 2024-01-22
Payer: MEDICARE

## 2024-01-22 VITALS
HEART RATE: 73 BPM | BODY MASS INDEX: 49.08 KG/M2 | SYSTOLIC BLOOD PRESSURE: 103 MMHG | TEMPERATURE: 98 F | WEIGHT: 250 LBS | DIASTOLIC BLOOD PRESSURE: 47 MMHG | OXYGEN SATURATION: 96 % | RESPIRATION RATE: 18 BRPM | HEIGHT: 60 IN

## 2024-01-22 PROCEDURE — 99204 OFFICE O/P NEW MOD 45 MIN: CPT

## 2024-01-22 RX ORDER — BENZONATATE 100 MG/1
100 CAPSULE ORAL
Qty: 15 | Refills: 0 | Status: COMPLETED | COMMUNITY
Start: 2023-10-17 | End: 2024-01-22

## 2024-01-22 RX ORDER — PERPHENAZINE 4 MG/1
4 TABLET ORAL TWICE DAILY
Refills: 0 | Status: COMPLETED | COMMUNITY
Start: 2021-04-15 | End: 2024-01-22

## 2024-01-22 RX ORDER — NYSTATIN 100000 1/G
100000 POWDER TOPICAL TWICE DAILY
Qty: 1 | Refills: 0 | Status: COMPLETED | COMMUNITY
Start: 2023-08-03 | End: 2024-01-22

## 2024-01-22 RX ORDER — ALCOHOL ANTISEPTIC PADS
PADS, MEDICATED (EA) TOPICAL
Refills: 0 | Status: COMPLETED | COMMUNITY
Start: 2021-04-15 | End: 2024-01-22

## 2024-01-22 RX ORDER — MELOXICAM 15 MG/1
15 TABLET ORAL
Qty: 30 | Refills: 0 | Status: COMPLETED | COMMUNITY
Start: 2022-06-30 | End: 2024-01-22

## 2024-01-22 RX ORDER — CEPHALEXIN 500 MG/1
500 TABLET ORAL 3 TIMES DAILY
Qty: 21 | Refills: 0 | Status: COMPLETED | COMMUNITY
Start: 2023-12-11 | End: 2024-01-22

## 2024-01-22 RX ORDER — ASCORBIC ACID 500 MG
500-400 TABLET,CHEWABLE ORAL
Refills: 0 | Status: COMPLETED | COMMUNITY
End: 2024-01-22

## 2024-01-22 RX ORDER — NYSTATIN 100000 [USP'U]/G
100000 CREAM TOPICAL TWICE DAILY
Qty: 1 | Refills: 1 | Status: COMPLETED | COMMUNITY
Start: 2022-06-22 | End: 2024-01-22

## 2024-01-22 RX ORDER — ACETAMINOPHEN 500 MG/1
500 TABLET, COATED ORAL
Qty: 90 | Refills: 0 | Status: COMPLETED | COMMUNITY
Start: 2019-04-05 | End: 2024-01-22

## 2024-01-22 RX ORDER — MIRABEGRON 50 MG/1
50 TABLET, FILM COATED, EXTENDED RELEASE ORAL DAILY
Qty: 90 | Refills: 3 | Status: COMPLETED | COMMUNITY
Start: 2023-12-06 | End: 2024-01-22

## 2024-01-22 RX ORDER — MUPIROCIN 20 MG/G
2 OINTMENT TOPICAL
Qty: 1 | Refills: 0 | Status: COMPLETED | COMMUNITY
Start: 2021-06-15 | End: 2024-01-22

## 2024-01-22 NOTE — PHYSICAL EXAM
[General Appearance - Alert] : alert [General Appearance - In No Acute Distress] : in no acute distress [Auscultation Breath Sounds / Voice Sounds] : lungs were clear to auscultation bilaterally [] : no respiratory distress [Heart Rate And Rhythm] : heart rate was normal and rhythm regular [Heart Sounds] : normal S1 and S2 [Heart Sounds Gallop] : no gallops [Murmurs] : no murmurs [Heart Sounds Pericardial Friction Rub] : no pericardial rub [Examination Of The Chest] : the chest was normal in appearance [Chest Visual Inspection Thoracic Asymmetry] : no chest asymmetry [Diminished Respiratory Excursion] : normal chest expansion [No Focal Deficits] : no focal deficits [Oriented To Time, Place, And Person] : oriented to person, place, and time [Impaired Insight] : insight and judgment were intact [Affect] : the affect was normal

## 2024-01-22 NOTE — ASSESSMENT
[FreeTextEntry1] : July is a 63-year-old female who was initially seen by me several years ago and lost to follow-up she presents now with a CT scan from August that demonstrates a stable nodule that may represent a hamartoma per radiology and her initial exam.  She has not had imaging since August and I believe she is due.  I will be arranging a CT in early February for comparison.  Of note she is strongly in favor of observation alone at this point.  Thank you for allowing me to participate in the care of your patient.  45 minutes was spent during this encounter.  Isaiah Pa MD Department of Cardiovascular and Thoracic Surgery  [employer] [facility]

## 2024-01-22 NOTE — CONSULT LETTER
[FreeTextEntry2] : Dr. Ian Kearney [FreeTextEntry3] : Isaiah Pa MD Cardiovascular [place] [address] Tel. [phone] Fax [phone]

## 2024-01-22 NOTE — HISTORY OF PRESENT ILLNESS
[FreeTextEntry1] : Ms. SONIA ALVARADO is a 63-year-old female, referred by Dr. Kearney, who presents for consultation regarding CT findings of lung nodule. She was previously evaluated in our office in November 2020 and I recommended a PET imaging, which was performed, but she was lost to follow up.   Past medical history includes hyperlipidemia, prediabetes, fatty liver, hypertension.  Today she reports a cough that is worse at night. She does report reflux in her throat of phlegm. Patient denies chest pain, shortness of breath, palpitations, fevers, chills, fatigue and unintentional weight loss or gain.

## 2024-01-22 NOTE — DATA REVIEWED
[FreeTextEntry1] : CT CHEST  - ORDERED BY:  HERMELINDO ARTEAGA PROCEDURE DATE:  08/15/2023 INTERPRETATION:  INDICATION: lung nodule; TECHNIQUE: Unenhanced CT of the chest. Coronal, sagittal, and MIP images were reconstructed and reviewed. COMPARISON: 5/27/2022 chest CT.  FINDINGS:  AIRWAYS, LUNGS, PLEURA: Patent central airways. Stable 1.8 x 1.6 cm round solid nodule within lingula and surrounding lucency suggestive of air trapping. Stable punctate nodule within the right middle lobe. The lungs are otherwise clear. No new or enlarging nodule. No pleural effusion.  LYMPH NODES, MEDIASTINUM: No lymphadenopathy.  HEART, VESSELS: Heart size is normal. No pericardial effusion. Thoracic aorta normal in diameter.  VISUALIZED UPPER ABDOMEN: Hepatic steatosis. Cholecystectomy. Left renal cyst.  CHEST WALL, BONES: No aggressive osseous lesion.  LOWER NECK: Within normal limits.  IMPRESSION:  Stable 1.8 x 1.6 cm round solid nodule in the lingula  --- End of Report ---  OMAR WOOD MD; Attending Radiologist        CT CHEST  - ORDERED BY: HERMELINDO ARTEAGA PROCEDURE DATE:  05/27/2022 INTERPRETATION:  CLINICAL INFORMATION: Left lung nodule/mass  TECHNIQUE:  A volumetric CT acquisition of the chest was obtained from the thoracic inlet to the upper abdomen, without the administration  of intravenous contrast. Coronal and sagittal multiplanar reformations were also submitted.  Comparison: PET/CT 12/3/2020, chest CT 11/6/2020  FINDINGS:  Lungs/Airways/Pleura: Stable smoothly marginated 1.8 x 1.6 cm endobronchial nodule in the lingula with increased distal mucoid impaction and surrounding lucency suggestive of air trapping. No pleural effusion.  Mediastinum/Lymph nodes: No thoracic adenopathy.  Heart and Vessels: The cardiac chambers are normal in size. No pericardial effusion. Normal course and caliber of the aorta.  Upper Abdomen: Diffuse hepatic steatosis. Cholecystectomy has been performed. Partially imaged left renal cyst  Osseous structures and Soft Tissues: No aggressive bone lesions.  IMPRESSION:  Stable 1.8 x 1.6 cm endobronchial nodule in the lingula with increased distal mucoid impaction, consider carcinoid.  --- End of Report ---  JULIAN GUZMAN M.D., Attending Radiologist       PETCT SKUL-THI ONC FDG INIT PROCEDURE DATE:  12/03/2020 INTERPRETATION:  PROCEDURE:  PET/CT SKULL BASE-MID THIGH IMAGING RADIOPHARMACEUTICAL:  11.82 mCi F-18, FDG, I.V. CLINICAL INFORMATION: Well-circumscribed lingular nodule seen on CT chest. PET/CT is done as part of the initial treatment strategy evaluation.  TECHNIQUE:  Fasting blood sugar measured prior to injection of radiopharmaceutical was 101 mg/dl. Following intravenous injection of the radiopharmaceutical and an uptake period of approximately 50 minutes, FDG-PET/CT was obtained on a [employer] Discovery 710  scanner from the skull base to mid thigh. Oral contrast  was administered during the uptake period. CT was performed during shallow respiration. The CT protocol was optimized for PET attenuation correction and to provide anatomic detail for localization of PET abnormalities. The CT protocol was not designed to produce and cannot replace state-of-the-art diagnostic CT images with specific imaging protocols for different body parts and indications. Images were reviewed on a dedicated workstation using multiplanar reconstruction.  The standardized uptake values (SUV) are normalized to patient body weight and indicate the highest activity concentration (SUVmax) in a given disease site. All image numbers refer to axial image number.  COMPARISON:  No prior PET/CT  OTHER STUDIES USED FOR CORRELATION: None  FINDINGS:  HEAD/NECK: No abnormal avidity. Physiologic FDG activity seen in the visualized portions of the brain, major salivary glands and neck muscles.  THORAX:  No abnormal avidity.Physiologic FDG activity in the myocardium and blood pool.  LUNGS: Minimally FDG avid centrally located lingular nodule, 1.6 x 1.4 cm (SUV 2.7; image 88).  PLEURA/PERICARDIUM: No abnormal avidity. No pericardial or pleural effusions.  HEPATOBILIARY/PANCREAS:  Liver background SUV mean as a reference for comparing studies is 3.3. Cholecystectomy.  SPLEEN: No abnormal avidity.  ADRENAL GLANDS: No abnormal avidity.  KIDNEYS/URINARY BLADDER: Low-attenuation, photopenic cyst posterior interpolar left kidney 4.5 cm (image 138), non-FDG avid. Excreted activity is seen.   No abnormal avidity.  ABDOMINOPELVIC NODES:   No abnormal avidity.  BOWEL/PERITONEUM/MESENTERY:  No abnormal avidity.  PELVIC ORGANS: FDG avidity in the vaginal region probably excreted urinary activity.  BONES:  No abnormal avidity. Degenerative changes.  SOFT TISSUES:  No abnormal avidity.  IMPRESSION:  1.  Well-circumscribed lingular pulmonary nodule, with minimal FDG avidity less than mediastinal background, indeterminate.  HERNANDEZ NOLAND MD; Attending Radiologist

## 2024-02-16 ENCOUNTER — APPOINTMENT (OUTPATIENT)
Dept: UROLOGY | Facility: CLINIC | Age: 64
End: 2024-02-16
Payer: MEDICARE

## 2024-02-16 PROCEDURE — 99214 OFFICE O/P EST MOD 30 MIN: CPT

## 2024-02-16 NOTE — HISTORY OF PRESENT ILLNESS
[FreeTextEntry1] : 63F w BMI 49, depression, HTN, HLD, and SEEMA presents for UUI.  Spring 2023, developed UUI.  10/25/23, UA unremarkable.  12/6/23, PVR 0 ml. UCx grew GBS, and he was started on keflex. Started on mirabegron 50 mg/d. I also recommended decreasing intake of caffeine and sweetened beverages.  Today, she reports mirabegron has made a minimal impact, but it has not had any side effects. She has not decreased intake of caffeine or sweetened beverages. She also has not lost any weight.

## 2024-02-16 NOTE — ASSESSMENT
[FreeTextEntry1] : 63F w BMI 49, depression, HTN, HLD, and SEEMA presents for UUI. She has had minimal benefit from Mirabegron 50 mg. Her UUi is likely mainly lifestyle related. I encouraged her to decrease intake of caffeine and sweetened beverages and to lose weight. We will also try adding Vesicare.  -Continue mirabegron -Start Vesicare -d/c caffeine and sweetened beverages -PCP to help w weight loss

## 2024-03-15 ENCOUNTER — APPOINTMENT (OUTPATIENT)
Dept: INTERNAL MEDICINE | Facility: CLINIC | Age: 64
End: 2024-03-15
Payer: MEDICARE

## 2024-03-15 VITALS
HEIGHT: 60 IN | RESPIRATION RATE: 18 BRPM | SYSTOLIC BLOOD PRESSURE: 130 MMHG | BODY MASS INDEX: 53.01 KG/M2 | HEART RATE: 78 BPM | OXYGEN SATURATION: 97 % | DIASTOLIC BLOOD PRESSURE: 80 MMHG | WEIGHT: 270 LBS | TEMPERATURE: 97.8 F

## 2024-03-15 DIAGNOSIS — R09.81 NASAL CONGESTION: ICD-10-CM

## 2024-03-15 DIAGNOSIS — G47.33 OBSTRUCTIVE SLEEP APNEA (ADULT) (PEDIATRIC): ICD-10-CM

## 2024-03-15 DIAGNOSIS — F41.9 ANXIETY DISORDER, UNSPECIFIED: ICD-10-CM

## 2024-03-15 DIAGNOSIS — R05.9 COUGH, UNSPECIFIED: ICD-10-CM

## 2024-03-15 PROCEDURE — G2211 COMPLEX E/M VISIT ADD ON: CPT

## 2024-03-15 PROCEDURE — 99215 OFFICE O/P EST HI 40 MIN: CPT

## 2024-03-15 PROCEDURE — G0447 BEHAVIOR COUNSEL OBESITY 15M: CPT | Mod: 59

## 2024-03-15 RX ORDER — FLUTICASONE PROPIONATE 50 UG/1
50 SPRAY, METERED NASAL
Qty: 16 | Refills: 0 | Status: ACTIVE | COMMUNITY
Start: 2024-03-15 | End: 1900-01-01

## 2024-03-16 PROBLEM — F41.9 ANXIETY: Status: ACTIVE | Noted: 2019-04-05

## 2024-03-16 NOTE — REVIEW OF SYSTEMS
[Shortness Of Breath] : no shortness of breath [Cough] : cough [Wheezing] : no wheezing [Dyspnea on Exertion] : dyspnea on exertion [Negative] : Integumentary

## 2024-03-16 NOTE — ASSESSMENT
[FreeTextEntry1] : Depression/psychosis: She is followed by therapist/psychiatrist -perphenazine 4 mg twice daily and Lexapro 10 mg daily  follow-up with psychiatry symptoms are well controlled.  HTN: -BP at goal today -amlodipine 10mg PO daily advised low salt diet and exercise.  Lung mass/nodule: -CXR revealed hilar mass -previous CT chest revealed 1.7 x 1.5 lingular nodule possible hamartoma -She was seen by thoracic sx- to Dr Pa who ordered a PET CT which showed lung mass/nodule with minimal FGD activity which was indeterminate -She had repeat CT chest 5/2022 which showed 1.8 x 1.6 cm endobronchial; nodule in lingula possible carcinoid tumor -She had a chest x-ray 10/2022 which showed a 1.6 cm perihilar lung nodule she c/o cough , chest congestion for last 2 wks, ordered chest x-ray. prescribed flonase nasal spray 2 spray b/l for nasal sins congestion.   SEEMA: referred to pulmonary  for sleep study and to get  CPAP  Morbid Obesity/pre-DM: Last hemoglobin A1c was 6.2 prescribed metformin  mg bid. Advised low carb , Mediterranean diet,avoid carbonated beverage , added sugar and sweets. exercise min 150 min/wk. portion control, maintain a food diary. referred to wt. management. f/u in 3 months  Hypertriglyceridemia//hyperlipidemia -not on meds , advised low fat diet and exercise.  Elevated LFTs/fatty liver: advised mediterranean diet and exercise.  i reviewed old labs and discussed with pt.

## 2024-03-16 NOTE — HISTORY OF PRESENT ILLNESS
[FreeTextEntry8] : Ms. SONIA ALVARADO is a 63 year female she is here with a  who translate for her in English. pt. is concerned about her wt. gain , she had gained 15 lbs of wt. since her last visit. pt. stated she do not eat big meal , admits her diet consist of mostly carbs, also she eats unhealthy snacks she was not going for walk due to cold weather. she puts her daughter on the phone who tells me she has SEEMA do not use CPAP. c/o cough and congestion mostly at night, she uses 2 pillows to sleep , she gets up choking at night.

## 2024-03-16 NOTE — COUNSELING
[Benefits of weight loss discussed] : Benefits of weight loss discussed [Potential consequences of obesity discussed] : Potential consequences of obesity discussed [Structured Weight Management Program suggested:] : Structured weight management program suggested [Encouraged to maintain food diary] : Encouraged to maintain food diary [Encouraged to increase physical activity] : Encouraged to increase physical activity [Encouraged to use exercise tracking device] : Encouraged to use exercise tracking device [____ min/wk Activity] : [unfilled] min/wk activity [Decrease Portions] : decrease portions [Keep Food Diary] : keep food diary [FreeTextEntry3] : wt. bearing exercise [FreeTextEntry4] : 15

## 2024-03-16 NOTE — PHYSICAL EXAM
[No Edema] : there was no peripheral edema [de-identified] : erythematous and edematous b/l nasal mucosa. [Normal] : normal gait, coordination grossly intact, no focal deficits and deep tendon reflexes were 2+ and symmetric

## 2024-03-25 ENCOUNTER — RX CHANGE (OUTPATIENT)
Age: 64
End: 2024-03-25

## 2024-03-28 ENCOUNTER — APPOINTMENT (OUTPATIENT)
Dept: CT IMAGING | Facility: CLINIC | Age: 64
End: 2024-03-28
Payer: MEDICARE

## 2024-03-28 ENCOUNTER — OUTPATIENT (OUTPATIENT)
Dept: OUTPATIENT SERVICES | Facility: HOSPITAL | Age: 64
LOS: 1 days | End: 2024-03-28
Payer: MEDICARE

## 2024-03-28 DIAGNOSIS — R91.1 SOLITARY PULMONARY NODULE: ICD-10-CM

## 2024-03-28 DIAGNOSIS — R91.8 OTHER NONSPECIFIC ABNORMAL FINDING OF LUNG FIELD: ICD-10-CM

## 2024-03-28 DIAGNOSIS — Z98.89 OTHER SPECIFIED POSTPROCEDURAL STATES: Chronic | ICD-10-CM

## 2024-03-28 PROCEDURE — 71250 CT THORAX DX C-: CPT | Mod: 26,MH

## 2024-03-28 PROCEDURE — 71250 CT THORAX DX C-: CPT

## 2024-04-04 ENCOUNTER — NON-APPOINTMENT (OUTPATIENT)
Age: 64
End: 2024-04-04

## 2024-04-08 ENCOUNTER — APPOINTMENT (OUTPATIENT)
Dept: THORACIC SURGERY | Facility: CLINIC | Age: 64
End: 2024-04-08

## 2024-04-19 ENCOUNTER — APPOINTMENT (OUTPATIENT)
Dept: UROLOGY | Facility: CLINIC | Age: 64
End: 2024-04-19
Payer: MEDICARE

## 2024-04-19 VITALS — BODY MASS INDEX: 51.04 KG/M2 | HEIGHT: 60 IN | WEIGHT: 260 LBS

## 2024-04-19 VITALS — HEART RATE: 77 BPM | SYSTOLIC BLOOD PRESSURE: 138 MMHG | DIASTOLIC BLOOD PRESSURE: 75 MMHG

## 2024-04-19 PROCEDURE — 99214 OFFICE O/P EST MOD 30 MIN: CPT

## 2024-04-19 RX ORDER — MIRABEGRON 50 MG/1
50 TABLET, FILM COATED, EXTENDED RELEASE ORAL
Qty: 90 | Refills: 3 | Status: ACTIVE | COMMUNITY
Start: 2024-04-19 | End: 1900-01-01

## 2024-04-20 ENCOUNTER — NON-APPOINTMENT (OUTPATIENT)
Age: 64
End: 2024-04-20

## 2024-04-24 ENCOUNTER — NON-APPOINTMENT (OUTPATIENT)
Age: 64
End: 2024-04-24

## 2024-05-01 ENCOUNTER — NON-APPOINTMENT (OUTPATIENT)
Age: 64
End: 2024-05-01

## 2024-05-06 ENCOUNTER — APPOINTMENT (OUTPATIENT)
Dept: THORACIC SURGERY | Facility: CLINIC | Age: 64
End: 2024-05-06
Payer: MEDICARE

## 2024-05-08 ENCOUNTER — APPOINTMENT (OUTPATIENT)
Dept: UROLOGY | Facility: CLINIC | Age: 64
End: 2024-05-08

## 2024-05-08 ENCOUNTER — APPOINTMENT (OUTPATIENT)
Dept: INTERNAL MEDICINE | Facility: CLINIC | Age: 64
End: 2024-05-08
Payer: MEDICARE

## 2024-05-08 VITALS
BODY MASS INDEX: 51.04 KG/M2 | SYSTOLIC BLOOD PRESSURE: 142 MMHG | HEIGHT: 60 IN | RESPIRATION RATE: 18 BRPM | TEMPERATURE: 98.1 F | OXYGEN SATURATION: 95 % | DIASTOLIC BLOOD PRESSURE: 77 MMHG | HEART RATE: 71 BPM | WEIGHT: 260 LBS

## 2024-05-08 DIAGNOSIS — F29 UNSPECIFIED PSYCHOSIS NOT DUE TO A SUBSTANCE OR KNOWN PHYSIOLOGICAL CONDITION: ICD-10-CM

## 2024-05-08 DIAGNOSIS — F32.A DEPRESSION, UNSPECIFIED: ICD-10-CM

## 2024-05-08 DIAGNOSIS — N39.0 URINARY TRACT INFECTION, SITE NOT SPECIFIED: ICD-10-CM

## 2024-05-08 LAB
BILIRUB UR QL STRIP: NEGATIVE
CLARITY UR: CLEAR
COLLECTION METHOD: NORMAL
GLUCOSE UR-MCNC: NEGATIVE
HCG UR QL: 0.2 EU/DL
HGB UR QL STRIP.AUTO: NORMAL
KETONES UR-MCNC: NORMAL
LEUKOCYTE ESTERASE UR QL STRIP: NORMAL
NITRITE UR QL STRIP: NEGATIVE
PH UR STRIP: 5.5
PROT UR STRIP-MCNC: NEGATIVE
SP GR UR STRIP: 1.02

## 2024-05-08 PROCEDURE — 99214 OFFICE O/P EST MOD 30 MIN: CPT

## 2024-05-08 PROCEDURE — G2211 COMPLEX E/M VISIT ADD ON: CPT

## 2024-05-08 PROCEDURE — 81003 URINALYSIS AUTO W/O SCOPE: CPT | Mod: QW

## 2024-05-09 PROBLEM — F32.A DEPRESSION: Status: ACTIVE | Noted: 2020-03-10

## 2024-05-09 PROBLEM — F29 PSYCHOSIS: Status: ACTIVE | Noted: 2021-04-15

## 2024-05-09 NOTE — COUNSELING
[Potential consequences of obesity discussed] : Potential consequences of obesity discussed [Benefits of weight loss discussed] : Benefits of weight loss discussed [Encouraged to maintain food diary] : Encouraged to maintain food diary [Encouraged to increase physical activity] : Encouraged to increase physical activity [Encouraged to use exercise tracking device] : Encouraged to use exercise tracking device [Decrease Portions] : decrease portions [____ min/wk Activity] : [unfilled] min/wk activity [Keep Food Diary] : keep food diary [FreeTextEntry3] : wt. bearing exercise

## 2024-05-09 NOTE — REVIEW OF SYSTEMS
[Shortness Of Breath] : no shortness of breath [Wheezing] : no wheezing [Cough] : cough [Dyspnea on Exertion] : dyspnea on exertion [Dysuria] : dysuria [Negative] : Integumentary [FreeTextEntry8] : as hpi

## 2024-05-09 NOTE — ASSESSMENT
[FreeTextEntry1] : UTI: Patient is currently on nitrofurantoin prescribed from urgent care continues to have symptoms Urine dipstick positive for leukocyte. Prescribed  cephalexin 500 mg twice a day. Urine culture sent.   drink more water. complete the antibiotic course. maintain good hygiene. cranberry juice recommended as a prophylaxis.  she will follow-up with urology.  Depression/psychosis: She is followed by therapist/psychiatrist -perphenazine 4 mg twice daily and Lexapro 10 mg daily  follow-up with psychiatry symptoms are well controlled.  HTN: -BP at goal today -amlodipine 10mg PO daily advised low salt diet and exercise.  Lung mass/nodule: -CXR revealed hilar mass -previous CT chest revealed 1.7 x 1.5 lingular nodule possible hamartoma -She was seen by thoracic sx- to Dr Pa who ordered a PET CT which showed lung mass/nodule with minimal FGD activity which was indeterminate -She had repeat CT chest 5/2022 which showed 1.8 x 1.6 cm endobronchial; nodule in lingula possible carcinoid tumor -She had a chest x-ray 10/2022 which showed a 1.6 cm perihilar lung nodule she c/o cough , chest congestion for last 2 wks, ordered chest x-ray. prescribed flonase nasal spray 2 spray b/l for nasal sins congestion.  Morbid Obesity/pre-DM: Last hemoglobin A1c was 6.2 prescribed metformin  mg bid. Advised low carb , Mediterranean diet,avoid carbonated beverage , added sugar and sweets. exercise min 150 min/wk. portion control, maintain a food diary. referred to wt. management. f/u in 3 months  Hypertriglyceridemia//hyperlipidemia -not on meds , advised low fat diet and exercise.  Elevated LFTs/fatty liver: advised mediterranean diet and exercise.  i reviewed old labs and discussed with pt.

## 2024-05-09 NOTE — HISTORY OF PRESENT ILLNESS
[FreeTextEntry8] : Ms. ALVARADO presents today complaining of burning sensation during urination.  Patient stated she went to urgent care and found out she had a UTI was prescribed nitrofurantoin she is currently taking antibiotics but stated that her symptoms are not getting better. Patient has seen urology for frequent UTI she is currently on mirabegron 50 mg and Vesicare. She was prescribed metformin 500 mg twice a day for weight loss.  Patient claims that she is she is Taking medication.  has lost 5 pounds since her last visit.

## 2024-05-10 LAB — BACTERIA UR CULT: NORMAL

## 2024-05-13 PROBLEM — R91.1 LUNG NODULE: Status: ACTIVE | Noted: 2020-11-11

## 2024-05-20 ENCOUNTER — APPOINTMENT (OUTPATIENT)
Dept: THORACIC SURGERY | Facility: CLINIC | Age: 64
End: 2024-05-20
Payer: MEDICARE

## 2024-05-20 VITALS
DIASTOLIC BLOOD PRESSURE: 80 MMHG | WEIGHT: 260 LBS | BODY MASS INDEX: 51.04 KG/M2 | RESPIRATION RATE: 16 BRPM | HEIGHT: 60 IN | HEART RATE: 72 BPM | OXYGEN SATURATION: 95 % | TEMPERATURE: 98 F | SYSTOLIC BLOOD PRESSURE: 138 MMHG

## 2024-05-20 DIAGNOSIS — R91.1 SOLITARY PULMONARY NODULE: ICD-10-CM

## 2024-05-20 PROCEDURE — 99214 OFFICE O/P EST MOD 30 MIN: CPT

## 2024-05-20 RX ORDER — LORATADINE 10 MG
17 TABLET,DISINTEGRATING ORAL TWICE DAILY
Qty: 6 | Refills: 9 | Status: COMPLETED | COMMUNITY
Start: 2024-04-19 | End: 2024-05-20

## 2024-05-20 RX ORDER — CEPHALEXIN 500 MG/1
500 TABLET ORAL
Qty: 10 | Refills: 0 | Status: COMPLETED | COMMUNITY
Start: 2024-05-08 | End: 2024-05-20

## 2024-05-20 RX ORDER — SOLIFENACIN SUCCINATE 10 MG/1
10 TABLET ORAL
Qty: 90 | Refills: 3 | Status: COMPLETED | COMMUNITY
Start: 2024-02-16 | End: 2024-05-20

## 2024-05-20 RX ORDER — POLYETHYLENE GLYCOL 3350 17 G/17G
17 POWDER, FOR SOLUTION ORAL DAILY
Qty: 5 | Refills: 9 | Status: COMPLETED | COMMUNITY
Start: 2024-02-16 | End: 2024-05-20

## 2024-05-20 NOTE — DATA REVIEWED
[FreeTextEntry1] : CT CHEST performed through [facility] PROCEDURE DATE:  03/28/2024 INTERPRETATION:  INDICATION: Lung nodule TECHNIQUE: Helical acquisition images of the chest without intravenous contrast. Maximum intensity projection images were generated.  COMPARISON: CT chest 8/15/2023.  FINDINGS:  LUNGS/AIRWAYS/PLEURA: Unchanged round circumscribed 1.8 cm solid nodule in the lingula. New mild peripheral groundglass distal to the nodule (3-57). No pleural effusion.  LYMPH NODES/MEDIASTINUM: No lymphadenopathy.  HEART/VASCULATURE: Enlarged heart. No pericardial effusion. Normal caliber aorta.  UPPER ABDOMEN: Left renal cyst. Cholecystectomy.  BONES/SOFT TISSUES: Unremarkable.   IMPRESSION:  Unchanged 1.8 cm round circumscribed nodule in the lingula, differential for which includes carcinoid and hamartoma.  New mild groundglass in the lingula peripheral to the nodule, likely inflammatory.  --- End of Report ---       TWYLA KINGSTON M.D., ATTENDING RADIOLOGIST       CT CHEST - ORDERED BY: HERMELINDO ARTEAGA PROCEDURE DATE: 08/15/2023 INTERPRETATION: INDICATION: lung nodule; TECHNIQUE: Unenhanced CT of the chest. Coronal, sagittal, and MIP images were reconstructed and reviewed. COMPARISON: 5/27/2022 chest CT.  FINDINGS:  AIRWAYS, LUNGS, PLEURA: Patent central airways. Stable 1.8 x 1.6 cm round solid nodule within lingula and surrounding lucency suggestive of air trapping. Stable punctate nodule within the right middle lobe. The lungs are otherwise clear. No new or enlarging nodule. No pleural effusion.  LYMPH NODES, MEDIASTINUM: No lymphadenopathy.  HEART, VESSELS: Heart size is normal. No pericardial effusion. Thoracic aorta normal in diameter.  VISUALIZED UPPER ABDOMEN: Hepatic steatosis. Cholecystectomy. Left renal cyst.  CHEST WALL, BONES: No aggressive osseous lesion.  LOWER NECK: Within normal limits.  IMPRESSION:  Stable 1.8 x 1.6 cm round solid nodule in the lingula  --- End of Report ---  OMAR WOOD MD; Attending Radiologist        CT CHEST - ORDERED BY: HERMELINDO ARTEAGA PROCEDURE DATE: 05/27/2022 INTERPRETATION: CLINICAL INFORMATION: Left lung nodule/mass  TECHNIQUE: A volumetric CT acquisition of the chest was obtained from the thoracic inlet to the upper abdomen, without the administration of intravenous contrast. Coronal and sagittal multiplanar reformations were also submitted.  Comparison: PET/CT 12/3/2020, chest CT 11/6/2020  FINDINGS:  Lungs/Airways/Pleura: Stable smoothly marginated 1.8 x 1.6 cm endobronchial nodule in the lingula with increased distal mucoid impaction and surrounding lucency suggestive of air trapping. No pleural effusion.  Mediastinum/Lymph nodes: No thoracic adenopathy.  Heart and Vessels: The cardiac chambers are normal in size. No pericardial effusion. Normal course and caliber of the aorta.  Upper Abdomen: Diffuse hepatic steatosis. Cholecystectomy has been performed. Partially imaged left renal cyst  Osseous structures and Soft Tissues: No aggressive bone lesions.  IMPRESSION:  Stable 1.8 x 1.6 cm endobronchial nodule in the lingula with increased distal mucoid impaction, consider carcinoid.  --- End of Report ---  JULIAN GUZMAN M.D., Attending Radiologist       PETCT [facility] FDG INIT PROCEDURE DATE: 12/03/2020 INTERPRETATION: PROCEDURE: PET/CT SKULL BASE-MID THIGH IMAGING RADIOPHARMACEUTICAL: 11.82 mCi F-18, FDG, I.V. CLINICAL INFORMATION: Well-circumscribed lingular nodule seen on CT chest. PET/CT is done as part of the initial treatment strategy evaluation.  TECHNIQUE: Fasting blood sugar measured prior to injection of radiopharmaceutical was 101 mg/dl. Following intravenous injection of the radiopharmaceutical and an uptake period of approximately 50 minutes, FDG-PET/CT was obtained on a [employer] Discovery 710 scanner from the skull base to mid thigh. Oral contrast was administered during the uptake period. CT was performed during shallow respiration. The CT protocol was optimized for PET attenuation correction and to provide anatomic detail for localization of PET abnormalities. The CT protocol was not designed to produce and cannot replace state-of-the-art diagnostic CT images with specific imaging protocols for different body parts and indications. Images were reviewed on a dedicated workstation using multiplanar reconstruction.  The standardized uptake values (SUV) are normalized to patient body weight and indicate the highest activity concentration (SUVmax) in a given disease site. All image numbers refer to axial image number.  COMPARISON: No prior PET/CT  OTHER STUDIES USED FOR CORRELATION: None  FINDINGS:  HEAD/NECK: No abnormal avidity. Physiologic FDG activity seen in the visualized portions of the brain, major salivary glands and neck muscles.  THORAX: No abnormal avidity.Physiologic FDG activity in the myocardium and blood pool.  LUNGS: Minimally FDG avid centrally located lingular nodule, 1.6 x 1.4 cm (SUV 2.7; image 88).  PLEURA/PERICARDIUM: No abnormal avidity. No pericardial or pleural effusions.  HEPATOBILIARY/PANCREAS: Liver background SUV mean as a reference for comparing studies is 3.3. Cholecystectomy.  SPLEEN: No abnormal avidity.  ADRENAL GLANDS: No abnormal avidity.  KIDNEYS/URINARY BLADDER: Low-attenuation, photopenic cyst posterior interpolar left kidney 4.5 cm (image 138), non-FDG avid. Excreted activity is seen. No abnormal avidity.  ABDOMINOPELVIC NODES: No abnormal avidity.  BOWEL/PERITONEUM/MESENTERY: No abnormal avidity.  PELVIC ORGANS: FDG avidity in the vaginal region probably excreted urinary activity.  BONES: No abnormal avidity. Degenerative changes.  SOFT TISSUES: No abnormal avidity.  IMPRESSION:  1. Well-circumscribed lingular pulmonary nodule, with minimal FDG avidity less than mediastinal background, indeterminate.  HERNANDEZ NOLAND MD; Attending Radiologist.

## 2024-05-20 NOTE — REVIEW OF SYSTEMS
[Feeling Tired] : feeling tired [Shortness Of Breath] : shortness of breath [Cough] : cough [Diarrhea] : diarrhea [Arthralgias] : arthralgias [Negative] : Neurological [Fever] : no fever [Chills] : no chills [Sleep Disturbances] : no sleep disturbances

## 2024-05-20 NOTE — HISTORY OF PRESENT ILLNESS
[FreeTextEntry1] : Ms. SONIA ALVARADO is a 63-year-old female, referred by Dr. Kearney, who presents for a follow up appointment regarding CT findings from August 2023 that demonstrated a nodule in the lingula. She was previously evaluated in our office in November 2020 and I recommended a PET imaging, which was performed, but she was lost to follow up. At our last office in January 2024, I recommended that she obtain repeat CT chest imaging, which she presents for today to review.  Past medical history includes hyperlipidemia, prediabetes, fatty liver, hypertension.  Today she reports a chronic and productive cough with clear phlegm. This has been going on for over a year. This gets worse at night. She feels like she has chest congestion but no sickness or fevers. This just came on about a year ago but was not triggered originally by any sickness.   The patient is accompanied by her daughter, Candice, who assists with history taking.

## 2024-05-20 NOTE — PHYSICAL EXAM
[] : no respiratory distress [Auscultation Breath Sounds / Voice Sounds] : lungs were clear to auscultation bilaterally [Heart Rate And Rhythm] : heart rate was normal and rhythm regular [Heart Sounds] : normal S1 and S2 [Heart Sounds Gallop] : no gallops [Murmurs] : no murmurs [Heart Sounds Pericardial Friction Rub] : no pericardial rub [Examination Of The Chest] : the chest was normal in appearance [Diminished Respiratory Excursion] : normal chest expansion [Chest Visual Inspection Thoracic Asymmetry] : no chest asymmetry [No Focal Deficits] : no focal deficits [Oriented To Time, Place, And Person] : oriented to person, place, and time [Impaired Insight] : insight and judgment were intact [Affect] : the affect was normal

## 2024-05-20 NOTE — ASSESSMENT
[FreeTextEntry1] : July is a 63-year-old female with a well-circumscribed nodule in the lingular segment of the left upper lobe that has remained stable since 2020.  I do believe continued observation is appropriate as this may represent a hamartoma as previously stated by radiology.  I would like her to obtain a CT scan in 6 months time.  Thank you for allowing me to participate in the care of your patient.  30 minutes was spent during this encounter.  Isaiah Pa MD Department of Cardiovascular and Thoracic Surgery  [employer] [facility]

## 2024-06-02 ENCOUNTER — NON-APPOINTMENT (OUTPATIENT)
Age: 64
End: 2024-06-02

## 2024-06-10 ENCOUNTER — RX RENEWAL (OUTPATIENT)
Age: 64
End: 2024-06-10

## 2024-06-21 ENCOUNTER — APPOINTMENT (OUTPATIENT)
Dept: INTERNAL MEDICINE | Facility: CLINIC | Age: 64
End: 2024-06-21
Payer: MEDICARE

## 2024-06-21 VITALS
HEIGHT: 60 IN | HEART RATE: 88 BPM | WEIGHT: 252 LBS | BODY MASS INDEX: 49.48 KG/M2 | RESPIRATION RATE: 16 BRPM | DIASTOLIC BLOOD PRESSURE: 89 MMHG | OXYGEN SATURATION: 98 % | SYSTOLIC BLOOD PRESSURE: 131 MMHG | TEMPERATURE: 97.8 F

## 2024-06-21 DIAGNOSIS — E66.01 MORBID (SEVERE) OBESITY DUE TO EXCESS CALORIES: ICD-10-CM

## 2024-06-21 DIAGNOSIS — R22.0 LOCALIZED SWELLING, MASS AND LUMP, HEAD: ICD-10-CM

## 2024-06-21 DIAGNOSIS — R73.03 PREDIABETES.: ICD-10-CM

## 2024-06-21 DIAGNOSIS — I10 ESSENTIAL (PRIMARY) HYPERTENSION: ICD-10-CM

## 2024-06-21 LAB — CYTOLOGY CVX/VAG DOC THIN PREP: NORMAL

## 2024-06-21 PROCEDURE — G2211 COMPLEX E/M VISIT ADD ON: CPT

## 2024-06-21 PROCEDURE — G0444 DEPRESSION SCREEN ANNUAL: CPT | Mod: 59

## 2024-06-21 PROCEDURE — 99214 OFFICE O/P EST MOD 30 MIN: CPT

## 2024-06-21 PROCEDURE — 36415 COLL VENOUS BLD VENIPUNCTURE: CPT

## 2024-06-21 PROCEDURE — G0447 BEHAVIOR COUNSEL OBESITY 15M: CPT | Mod: 59

## 2024-06-21 NOTE — HISTORY OF PRESENT ILLNESS
[de-identified] : Ms. SONIA ALVARADO is a 63 year female she is over all doing well. she is on metformin for prediabetes and wt. loss, tolerating well ,denied any s/e  she had lost 8 lbs since last visit. stated she had cut down on carbs, She is not able to walk that much because of arthritis in both her knee joint.  [FreeTextEntry1] : pt. is her for f/u.

## 2024-06-21 NOTE — COUNSELING
[Potential consequences of obesity discussed] : Potential consequences of obesity discussed [Benefits of weight loss discussed] : Benefits of weight loss discussed [Encouraged to maintain food diary] : Encouraged to maintain food diary [Encouraged to increase physical activity] : Encouraged to increase physical activity [Encouraged to use exercise tracking device] : Encouraged to use exercise tracking device [Decrease Portions] : decrease portions [____ min/wk Activity] : [unfilled] min/wk activity [Keep Food Diary] : keep food diary [FreeTextEntry3] : wt. bearing exercise [FreeTextEntry4] : 15

## 2024-06-21 NOTE — ASSESSMENT
[FreeTextEntry1] : Morbid Obesity/pre-DM: Last hemoglobin A1c was 6.2, ordered a1c prescribed metformin  mg bid. Advised low carb , Mediterranean diet, avoid carbonated beverage , added sugar and sweets. exercise min 150 min/wk. portion control, maintain a food diary. she has not been to wt. management   Depression/psychosis: She is followed by therapist/psychiatrist on perphenazine 4 mg twice daily and Lexapro 10 mg daily she is in a very good mood , today, she is stable on current regimen.  HTN: -BP at goal today -amlodipine 10mg PO daily advised low salt diet and exercise.  Lung mass/nodule: -CXR revealed hilar mass -previous CT chest revealed 1.7 x 1.5 lingular nodule possible hamartoma -She was seen by thoracic sx- to Dr Pa who ordered a PET CT which showed lung mass/nodule with minimal FGD activity which was indeterminate -She had repeat CT chest 5/2022 which showed 1.8 x 1.6 cm endobronchial; nodule in lingula possible carcinoid tumor -She had a chest x-ray 10/2022 which showed a 1.6 cm perihilar lung nodule she will f/u with cardiothoracic.  Morbid Obesity/pre-DM: lost 8 lbs since last visit. Last hemoglobin A1c was 6.2, after i review labs will go up on the dose of metformin to 850 mg bid. on metformin  mg bid. Advised low carb , Mediterranean diet,avoid carbonated beverage , added sugar and sweets. exercise min 150 min/wk. portion control, maintain a food diary.  Hypertriglyceridemia//hyperlipidemia -not on meds , advised low fat diet and exercise.  Elevated LFTs/fatty liver: advised mediterranean diet and exercise.  f/u in 3 months for cpe.

## 2024-06-23 LAB
ALBUMIN SERPL ELPH-MCNC: 4.5 G/DL
ALP BLD-CCNC: 108 U/L
ALT SERPL-CCNC: 35 U/L
ANION GAP SERPL CALC-SCNC: 14 MMOL/L
APPEARANCE: ABNORMAL
AST SERPL-CCNC: 39 U/L
BACTERIA: NEGATIVE /HPF
BILIRUB SERPL-MCNC: 0.7 MG/DL
BILIRUBIN URINE: NEGATIVE
BLOOD URINE: NEGATIVE
BUN SERPL-MCNC: 12 MG/DL
CALCIUM SERPL-MCNC: 9.8 MG/DL
CAST: 0 /LPF
CHLORIDE SERPL-SCNC: 105 MMOL/L
CO2 SERPL-SCNC: 23 MMOL/L
COLOR: YELLOW
CREAT SERPL-MCNC: 0.68 MG/DL
EGFR: 97 ML/MIN/1.73M2
EPITHELIAL CELLS: 3 /HPF
ESTIMATED AVERAGE GLUCOSE: 126 MG/DL
GLUCOSE QUALITATIVE U: NEGATIVE MG/DL
GLUCOSE SERPL-MCNC: 118 MG/DL
HBA1C MFR BLD HPLC: 6 %
KETONES URINE: NEGATIVE MG/DL
LEUKOCYTE ESTERASE URINE: NEGATIVE
MICROSCOPIC-UA: NORMAL
NITRITE URINE: NEGATIVE
PH URINE: 6
POTASSIUM SERPL-SCNC: 4.3 MMOL/L
PROT SERPL-MCNC: 7 G/DL
PROTEIN URINE: NEGATIVE MG/DL
RED BLOOD CELLS URINE: 1 /HPF
REVIEW: NORMAL
SODIUM SERPL-SCNC: 142 MMOL/L
SPECIFIC GRAVITY URINE: 1
UROBILINOGEN URINE: 0.2 MG/DL
WHITE BLOOD CELLS URINE: 0 /HPF

## 2024-06-23 RX ORDER — METFORMIN ER 750 MG 750 MG/1
750 TABLET ORAL TWICE DAILY
Qty: 180 | Refills: 1 | Status: ACTIVE | COMMUNITY
Start: 2024-03-15 | End: 1900-01-01

## 2024-07-09 ENCOUNTER — APPOINTMENT (OUTPATIENT)
Dept: ORTHOPEDIC SURGERY | Facility: CLINIC | Age: 64
End: 2024-07-09
Payer: COMMERCIAL

## 2024-07-09 DIAGNOSIS — S46.012A STRAIN OF MUSCLE(S) AND TENDON(S) OF THE ROTATOR CUFF OF LEFT SHOULDER, INITIAL ENCOUNTER: ICD-10-CM

## 2024-07-09 DIAGNOSIS — M17.12 UNILATERAL PRIMARY OSTEOARTHRITIS, LEFT KNEE: ICD-10-CM

## 2024-07-09 DIAGNOSIS — S80.02XA CONTUSION OF LEFT KNEE, INITIAL ENCOUNTER: ICD-10-CM

## 2024-07-09 DIAGNOSIS — S46.011A STRAIN OF MUSCLE(S) AND TENDON(S) OF THE ROTATOR CUFF OF RIGHT SHOULDER, INITIAL ENCOUNTER: ICD-10-CM

## 2024-07-09 PROCEDURE — 99204 OFFICE O/P NEW MOD 45 MIN: CPT

## 2024-07-09 PROCEDURE — 73562 X-RAY EXAM OF KNEE 3: CPT | Mod: LT

## 2024-07-09 RX ORDER — METHYLPREDNISOLONE 4 MG/1
4 TABLET ORAL
Qty: 1 | Refills: 0 | Status: ACTIVE | COMMUNITY
Start: 2024-07-09 | End: 1900-01-01

## 2024-07-09 RX ORDER — CYCLOBENZAPRINE HYDROCHLORIDE 5 MG/1
5 TABLET, FILM COATED ORAL
Qty: 30 | Refills: 0 | Status: ACTIVE | COMMUNITY
Start: 2024-07-09 | End: 1900-01-01

## 2024-07-10 ENCOUNTER — APPOINTMENT (OUTPATIENT)
Dept: UROLOGY | Facility: CLINIC | Age: 64
End: 2024-07-10
Payer: MEDICARE

## 2024-07-10 PROCEDURE — 99442: CPT | Mod: 93

## 2024-07-10 RX ORDER — MIRABEGRON 50 MG/1
50 TABLET, EXTENDED RELEASE ORAL
Qty: 90 | Refills: 3 | Status: ACTIVE | COMMUNITY
Start: 2024-07-10 | End: 1900-01-01

## 2024-07-10 RX ORDER — SOLIFENACIN SUCCINATE 10 MG/1
10 TABLET ORAL DAILY
Qty: 90 | Refills: 3 | Status: ACTIVE | COMMUNITY
Start: 2024-07-10 | End: 1900-01-01

## 2024-07-11 RX ORDER — SOLIFENACIN SUCCINATE 10 MG/1
10 TABLET ORAL
Qty: 90 | Refills: 3 | Status: ACTIVE | COMMUNITY
Start: 2024-07-11 | End: 1900-01-01

## 2024-07-16 ENCOUNTER — APPOINTMENT (OUTPATIENT)
Dept: INTERNAL MEDICINE | Facility: CLINIC | Age: 64
End: 2024-07-16

## 2024-08-06 ENCOUNTER — APPOINTMENT (OUTPATIENT)
Dept: ORTHOPEDIC SURGERY | Facility: CLINIC | Age: 64
End: 2024-08-06

## 2024-08-06 ENCOUNTER — APPOINTMENT (OUTPATIENT)
Dept: MRI IMAGING | Facility: CLINIC | Age: 64
End: 2024-08-06

## 2024-08-06 PROBLEM — M54.12 CERVICAL RADICULITIS: Status: ACTIVE | Noted: 2024-08-06

## 2024-08-06 PROBLEM — M54.16 LUMBAR RADICULITIS: Status: ACTIVE | Noted: 2024-08-06

## 2024-08-06 PROCEDURE — 72100 X-RAY EXAM L-S SPINE 2/3 VWS: CPT

## 2024-08-06 PROCEDURE — 72141 MRI NECK SPINE W/O DYE: CPT

## 2024-08-06 PROCEDURE — 99214 OFFICE O/P EST MOD 30 MIN: CPT

## 2024-08-06 PROCEDURE — 72148 MRI LUMBAR SPINE W/O DYE: CPT

## 2024-08-06 NOTE — IMAGING
[de-identified] : Spine: Inspection/Palpation: No tenderness to palpation throughout Cervical/thoracic/lumbar spine. except bilatearl traps  No bony stepoffs, No lesions.   Gait: antalgic, utilziing cane   Neurologic: Bilateral upper extremities 5/5 Deltoid/Biceps/Triceps/ Wrist Flexion/Wrist Extension/ / Intrinsics Except Bilateral Lower Extremities 5/5 Iliopsoas/Quadriceps/Hamstrings/ Tibialis Anterior/ Gastrocnemius. Extensor Hallucis Longus/ Flexor Hallucis Longus except   Sensation intact to light touch C5-T1 Sensation intact to light touch L2-S1    Negative Swift's  Ct C spien from preeconic shows degenerative cahgnes no fractures  X-ray Ap/Lateraof lumbar spine were viewed and interpreted.  Normal alignment is maintained without any spondylolisthesis.  L5-s1 disc height loss. no fractures

## 2024-08-06 NOTE — ASSESSMENT
[FreeTextEntry1] : 63 F with cervical and lumbar raidc PT MRI C and L spine Fu after MRis  alreayd on nsaids.  discussed major side effects of medication including but not limited to gastritis and acute kidney injury.  She was instructed to take with food and to discontinue use if stomach or esophageal pain developed.  Tried mDP 1 montha go on muscle relaxants.    Discussed side effects including but not limited to drowsiness and dizziness.  Discussed patient should not drive after taking the medicine.
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
## 2024-08-27 ENCOUNTER — APPOINTMENT (OUTPATIENT)
Dept: ORTHOPEDIC SURGERY | Facility: CLINIC | Age: 64
End: 2024-08-27
Payer: COMMERCIAL

## 2024-08-27 VITALS — WEIGHT: 252 LBS | HEIGHT: 60 IN | BODY MASS INDEX: 49.48 KG/M2

## 2024-08-27 DIAGNOSIS — M54.12 RADICULOPATHY, CERVICAL REGION: ICD-10-CM

## 2024-08-27 DIAGNOSIS — M54.16 RADICULOPATHY, LUMBAR REGION: ICD-10-CM

## 2024-08-27 PROCEDURE — 99213 OFFICE O/P EST LOW 20 MIN: CPT

## 2024-08-27 RX ORDER — MELOXICAM 15 MG/1
15 TABLET ORAL DAILY
Qty: 30 | Refills: 0 | Status: ACTIVE | COMMUNITY
Start: 2024-08-27 | End: 1900-01-01
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
## 2024-08-27 NOTE — DATA REVIEWED
[Cervical Spine] : cervical spine [MRI] : MRI [Lumbar Spine] : lumbar spine [I independently reviewed and interpreted images and report] : I independently reviewed and interpreted images and report

## 2024-08-27 NOTE — IMAGING
[de-identified] : Spine: Inspection/Palpation: No tenderness to palpation throughout Cervical/thoracic/lumbar spine. except bilatearl traps  No bony stepoffs, No lesions.   Gait: antalgic, utilziing cane   Neurologic: Bilateral upper extremities 5/5 Deltoid/Biceps/Triceps/ Wrist Flexion/Wrist Extension/ / Intrinsics Except Bilateral Lower Extremities 5/5 Iliopsoas/Quadriceps/Hamstrings/ Tibialis Anterior/ Gastrocnemius. Extensor Hallucis Longus/ Flexor Hallucis Longus except   Sensation intact to light touch C5-T1 Sensation intact to light touch L2-S1    Negative Swift's  Ct C spien from preeconic shows degenerative cahgnes no fractures  X-ray Ap/Lateraof lumbar spine were viewed and interpreted.  Normal alignment is maintained without any spondylolisthesis.  L5-s1 disc height loss. no fractures

## 2024-08-27 NOTE — ASSESSMENT
[FreeTextEntry1] : 63 F with cervical and lumbar Radic C/w PT  We will also provide a prescription for anti-inflammatories.  Discussed major side effects of medication including but not limited to gastritis and acute kidney injury.  She was instructed to take with food and to discontinue use if stomach or esophageal pain developed. FU 6 weeks

## 2024-09-04 ENCOUNTER — APPOINTMENT (OUTPATIENT)
Dept: UROLOGY | Facility: CLINIC | Age: 64
End: 2024-09-04

## 2024-09-13 ENCOUNTER — APPOINTMENT (OUTPATIENT)
Dept: INTERNAL MEDICINE | Facility: CLINIC | Age: 64
End: 2024-09-13
Payer: MEDICAID

## 2024-09-13 VITALS
BODY MASS INDEX: 46.01 KG/M2 | RESPIRATION RATE: 16 BRPM | OXYGEN SATURATION: 99 % | WEIGHT: 250 LBS | HEIGHT: 62 IN | HEART RATE: 67 BPM | SYSTOLIC BLOOD PRESSURE: 138 MMHG | TEMPERATURE: 97.8 F | DIASTOLIC BLOOD PRESSURE: 84 MMHG

## 2024-09-13 DIAGNOSIS — E78.1 PURE HYPERGLYCERIDEMIA: ICD-10-CM

## 2024-09-13 DIAGNOSIS — R91.1 SOLITARY PULMONARY NODULE: ICD-10-CM

## 2024-09-13 DIAGNOSIS — J40 BRONCHITIS, NOT SPECIFIED AS ACUTE OR CHRONIC: ICD-10-CM

## 2024-09-13 DIAGNOSIS — Z87.898 PERSONAL HISTORY OF OTHER SPECIFIED CONDITIONS: ICD-10-CM

## 2024-09-13 DIAGNOSIS — M25.562 PAIN IN RIGHT KNEE: ICD-10-CM

## 2024-09-13 DIAGNOSIS — R79.89 OTHER SPECIFIED ABNORMAL FINDINGS OF BLOOD CHEMISTRY: ICD-10-CM

## 2024-09-13 DIAGNOSIS — F32.A DEPRESSION, UNSPECIFIED: ICD-10-CM

## 2024-09-13 DIAGNOSIS — F29 UNSPECIFIED PSYCHOSIS NOT DUE TO A SUBSTANCE OR KNOWN PHYSIOLOGICAL CONDITION: ICD-10-CM

## 2024-09-13 DIAGNOSIS — Z00.00 ENCOUNTER FOR GENERAL ADULT MEDICAL EXAMINATION W/OUT ABNORMAL FINDINGS: ICD-10-CM

## 2024-09-13 DIAGNOSIS — I10 ESSENTIAL (PRIMARY) HYPERTENSION: ICD-10-CM

## 2024-09-13 DIAGNOSIS — R73.03 PREDIABETES.: ICD-10-CM

## 2024-09-13 DIAGNOSIS — E66.01 MORBID (SEVERE) OBESITY DUE TO EXCESS CALORIES: ICD-10-CM

## 2024-09-13 DIAGNOSIS — M25.561 PAIN IN RIGHT KNEE: ICD-10-CM

## 2024-09-13 DIAGNOSIS — F41.9 ANXIETY DISORDER, UNSPECIFIED: ICD-10-CM

## 2024-09-13 PROCEDURE — 96127 BRIEF EMOTIONAL/BEHAV ASSMT: CPT

## 2024-09-13 PROCEDURE — 99397 PER PM REEVAL EST PAT 65+ YR: CPT | Mod: 25

## 2024-09-13 PROCEDURE — 93000 ELECTROCARDIOGRAM COMPLETE: CPT

## 2024-09-13 NOTE — COUNSELING
[Potential consequences of obesity discussed] : Potential consequences of obesity discussed [Benefits of weight loss discussed] : Benefits of weight loss discussed [Encouraged to maintain food diary] : Encouraged to maintain food diary [Encouraged to increase physical activity] : Encouraged to increase physical activity [Decrease Portions] : decrease portions [____ min/wk Activity] : [unfilled] min/wk activity

## 2024-09-16 NOTE — PHYSICAL EXAM
[No Acute Distress] : no acute distress [Well-Appearing] : well-appearing [Normal Voice/Communication] : normal voice/communication [Normal Sclera/Conjunctiva] : normal sclera/conjunctiva [PERRL] : pupils equal round and reactive to light [EOMI] : extraocular movements intact [Normal Outer Ear/Nose] : the outer ears and nose were normal in appearance [Normal Oropharynx] : the oropharynx was normal [Normal TMs] : both tympanic membranes were normal [Normal Nasal Mucosa] : the nasal mucosa was normal [Normal] : normal rate, regular rhythm, normal S1 and S2 and no murmur heard [No Carotid Bruits] : no carotid bruits [No Edema] : there was no peripheral edema [Soft] : abdomen soft [Non Tender] : non-tender [Non-distended] : non-distended [No Masses] : no abdominal mass palpated [No HSM] : no HSM [Normal Bowel Sounds] : normal bowel sounds [No CVA Tenderness] : no CVA  tenderness [No Spinal Tenderness] : no spinal tenderness [No Joint Swelling] : no joint swelling [No Rash] : no rash [No Focal Deficits] : no focal deficits [Normal Affect] : the affect was normal [Alert and Oriented x3] : oriented to person, place, and time [Normal Mood] : the mood was normal [Normal Insight/Judgement] : insight and judgment were intact [de-identified] : Obese [de-identified] : no calf tenderness

## 2024-09-16 NOTE — HEALTH RISK ASSESSMENT
[Yes] : Yes [No] : In the past 12 months have you used drugs other than those required for medical reasons? No [I have developed a follow-up plan documented below in the note.] : I have developed a follow-up plan documented below in the note. [Patient reported mammogram was normal] : Patient reported mammogram was normal [With Family] : lives with family [Good] : ~his/her~  mood as  good [Little interest or pleasure doing things] : 1) Little interest or pleasure doing things [Feeling down, depressed, or hopeless] : 2) Feeling down, depressed, or hopeless [0] : 2) Feeling down, depressed, or hopeless: Not at all (0) [PHQ-2 Negative - No further assessment needed] : PHQ-2 Negative - No further assessment needed [Never] : Never [NO] : No [Patient reported PAP Smear was normal] : Patient reported PAP Smear was normal [Patient reported bone density results were normal] : Patient reported bone density results were normal [HIV Test offered] : HIV Test offered [Hepatitis C test declined] : Hepatitis C test declined [Unemployed] : unemployed [Smoke Detector] : smoke detector [Seat Belt] :  uses seat belt [Designated Healthcare Proxy] : Designated healthcare proxy [Relationship: ___] : Relationship: [unfilled] [de-identified] : rarely [[id number]] : 0 [Reports changes in hearing] : Reports no changes in hearing [Reports changes in vision] : Reports no changes in vision [Reports changes in dental health] : Reports no changes in dental health [MammogramDate] : 01/24 [PapSmearDate] : 01/23 [BoneDensityDate] : 01/24 [ColonoscopyDate] : 12/19 [AdvancecareDate] : 9/24

## 2024-09-16 NOTE — ASSESSMENT
[FreeTextEntry1] : Annual: Ordered comprehensive blood work , screen for hyperlipidemia , diabetes and thyroid disorder. Declined for HIV and Hep C  screening test. labs drawn in the office. The results will be reviewed, and any abnormalities will be addressed accordingly.   she is up to date on mammo and PAP : result  normal. up to date with colonoscopy , result  normal EKG: NSR , no ST-T wave changes DEXA: up to date Skin CA screening: Avoid excess sun exposure. To use sunscreen.  Deferred flu vaccine declined prevnar 20  Received  COVID vaccine   alcohol screening :SIBRT:0 Depression screening:PHQ2:0   Sleep apnea screening: low risk , document scanned to the chart.   Morbid Obesity/pre-DM: Last hemoglobin A1c was 6.2, ordered a1c prescribed metformin  mg bid. Advised low carb , Mediterranean diet, avoid carbonated beverage , added sugar and sweets. exercise min 150 min/wk. portion control, maintain a food diary. she has not been to wt. management  Depression/psychosis: She is followed by therapist/psychiatrist on perphenazine 4 mg twice daily and Lexapro 10 mg daily she is in a very good mood , today, she is stable on current regimen.  HTN: -BP at goal today -amlodipine 10mg PO daily advised low salt diet and exercise.  Lung mass/nodule: -CXR revealed hilar mass -previous CT chest revealed 1.7 x 1.5 lingular nodule possible hamartoma -She was seen by thoracic sx- to Dr Pa who ordered a PET CT which showed lung mass/nodule with minimal FGD activity which was indeterminate -She had repeat CT chest 5/2022 which showed 1.8 x 1.6 cm endobronchial; nodule in lingula possible carcinoid tumor -She had a chest x-ray 10/2022 which showed a 1.6 cm perihilar lung nodule she will f/u with cardiothoracic.  Hypertriglyceridemia//hyperlipidemia -not on meds , advised low fat diet and exercise.  Elevated LFTs/fatty liver: advised mediterranean diet and exercise.

## 2024-09-16 NOTE — HISTORY OF PRESENT ILLNESS
[FreeTextEntry1] : Annual wellness exam. [de-identified] : Ms. SONIA ALVARADO  here for wellness exam she is over all doing well. she is on metformin for prediabetes and wt. loss, tolerating well ,denied any s/e she had lost 2 lbs since last visit.

## 2024-09-16 NOTE — HEALTH RISK ASSESSMENT
[Yes] : Yes [No] : In the past 12 months have you used drugs other than those required for medical reasons? No [I have developed a follow-up plan documented below in the note.] : I have developed a follow-up plan documented below in the note. [Patient reported mammogram was normal] : Patient reported mammogram was normal [With Family] : lives with family [Good] : ~his/her~  mood as  good [Little interest or pleasure doing things] : 1) Little interest or pleasure doing things [Feeling down, depressed, or hopeless] : 2) Feeling down, depressed, or hopeless [0] : 2) Feeling down, depressed, or hopeless: Not at all (0) [PHQ-2 Negative - No further assessment needed] : PHQ-2 Negative - No further assessment needed [Never] : Never [NO] : No [Patient reported PAP Smear was normal] : Patient reported PAP Smear was normal [Patient reported bone density results were normal] : Patient reported bone density results were normal [HIV Test offered] : HIV Test offered [Hepatitis C test declined] : Hepatitis C test declined [Unemployed] : unemployed [Smoke Detector] : smoke detector [Seat Belt] :  uses seat belt [Designated Healthcare Proxy] : Designated healthcare proxy [Relationship: ___] : Relationship: [unfilled] [de-identified] : rarely [[id number]] : 0 [Reports changes in hearing] : Reports no changes in hearing [Reports changes in vision] : Reports no changes in vision [Reports changes in dental health] : Reports no changes in dental health [MammogramDate] : 01/24 [PapSmearDate] : 01/23 [BoneDensityDate] : 01/24 [ColonoscopyDate] : 12/19 [AdvancecareDate] : 9/24

## 2024-09-16 NOTE — PHYSICAL EXAM
[No Acute Distress] : no acute distress [Well-Appearing] : well-appearing [Normal Voice/Communication] : normal voice/communication [Normal Sclera/Conjunctiva] : normal sclera/conjunctiva [PERRL] : pupils equal round and reactive to light [EOMI] : extraocular movements intact [Normal Outer Ear/Nose] : the outer ears and nose were normal in appearance [Normal Oropharynx] : the oropharynx was normal [Normal TMs] : both tympanic membranes were normal [Normal Nasal Mucosa] : the nasal mucosa was normal [Normal] : normal rate, regular rhythm, normal S1 and S2 and no murmur heard [No Carotid Bruits] : no carotid bruits [No Edema] : there was no peripheral edema [Soft] : abdomen soft [Non Tender] : non-tender [Non-distended] : non-distended [No Masses] : no abdominal mass palpated [No HSM] : no HSM [Normal Bowel Sounds] : normal bowel sounds [No CVA Tenderness] : no CVA  tenderness [No Spinal Tenderness] : no spinal tenderness [No Joint Swelling] : no joint swelling [No Rash] : no rash [No Focal Deficits] : no focal deficits [Normal Affect] : the affect was normal [Alert and Oriented x3] : oriented to person, place, and time [Normal Mood] : the mood was normal [Normal Insight/Judgement] : insight and judgment were intact [de-identified] : Obese [de-identified] : no calf tenderness

## 2024-09-16 NOTE — REVIEW OF SYSTEMS
[Cough] : cough [Frequency] : frequency [Negative] : Psychiatric [Joint Pain] : joint pain [Fever] : no fever [Chills] : no chills [Fatigue] : no fatigue [Earache] : no earache [Nasal Discharge] : no nasal discharge [Sore Throat] : no sore throat [Chest Pain] : no chest pain [Palpitations] : no palpitations [Lower Ext Edema] : no lower extremity edema [Shortness Of Breath] : no shortness of breath [Wheezing] : no wheezing [Dyspnea on Exertion] : no dyspnea on exertion [Abdominal Pain] : no abdominal pain [Nausea] : no nausea [Diarrhea] : diarrhea [Vomiting] : no vomiting [Joint Stiffness] : no joint stiffness [Muscle Pain] : no muscle pain [Anxiety] : no anxiety [Depression] : no depression [FreeTextEntry2] : weight gain [FreeTextEntry8] : see HPI

## 2024-09-16 NOTE — HISTORY OF PRESENT ILLNESS
[FreeTextEntry1] : Annual wellness exam. [de-identified] : Ms. SONIA ALVARADO  here for wellness exam she is over all doing well. she is on metformin for prediabetes and wt. loss, tolerating well ,denied any s/e she had lost 2 lbs since last visit.

## 2024-09-17 LAB
ALBUMIN SERPL ELPH-MCNC: 4.4 G/DL
ALP BLD-CCNC: 114 U/L
ALT SERPL-CCNC: 27 U/L
ANION GAP SERPL CALC-SCNC: 12 MMOL/L
APPEARANCE: CLEAR
AST SERPL-CCNC: 30 U/L
BACTERIA: NEGATIVE /HPF
BASOPHILS # BLD AUTO: 0.05 K/UL
BASOPHILS NFR BLD AUTO: 0.6 %
BILIRUB SERPL-MCNC: 0.6 MG/DL
BILIRUBIN URINE: NEGATIVE
BLOOD URINE: NEGATIVE
BUN SERPL-MCNC: 13 MG/DL
CALCIUM SERPL-MCNC: 9.7 MG/DL
CAST: 0 /LPF
CHLORIDE SERPL-SCNC: 103 MMOL/L
CHOLEST SERPL-MCNC: 193 MG/DL
CO2 SERPL-SCNC: 28 MMOL/L
COLOR: YELLOW
CREAT SERPL-MCNC: 0.64 MG/DL
EGFR: 97 ML/MIN/1.73M2
EOSINOPHIL # BLD AUTO: 0.32 K/UL
EOSINOPHIL NFR BLD AUTO: 3.8 %
EPITHELIAL CELLS: 15 /HPF
ESTIMATED AVERAGE GLUCOSE: 123 MG/DL
GLUCOSE QUALITATIVE U: NEGATIVE MG/DL
GLUCOSE SERPL-MCNC: 107 MG/DL
HBA1C MFR BLD HPLC: 5.9 %
HCT VFR BLD CALC: 44.8 %
HDLC SERPL-MCNC: 77 MG/DL
HGB BLD-MCNC: 14.3 G/DL
HIV1+2 AB SPEC QL IA.RAPID: NONREACTIVE
IMM GRANULOCYTES NFR BLD AUTO: 0.5 %
KETONES URINE: NEGATIVE MG/DL
LDLC SERPL CALC-MCNC: 95 MG/DL
LEUKOCYTE ESTERASE URINE: ABNORMAL
LYMPHOCYTES # BLD AUTO: 2.47 K/UL
LYMPHOCYTES NFR BLD AUTO: 29.1 %
MAN DIFF?: NORMAL
MCHC RBC-ENTMCNC: 28.6 PG
MCHC RBC-ENTMCNC: 31.9 GM/DL
MCV RBC AUTO: 89.6 FL
MICROSCOPIC-UA: NORMAL
MONOCYTES # BLD AUTO: 0.38 K/UL
MONOCYTES NFR BLD AUTO: 4.5 %
NEUTROPHILS # BLD AUTO: 5.24 K/UL
NEUTROPHILS NFR BLD AUTO: 61.5 %
NITRITE URINE: NEGATIVE
NONHDLC SERPL-MCNC: 116 MG/DL
PH URINE: 6
PLATELET # BLD AUTO: 243 K/UL
POTASSIUM SERPL-SCNC: 4.7 MMOL/L
PROT SERPL-MCNC: 6.8 G/DL
PROTEIN URINE: NORMAL MG/DL
RBC # BLD: 5 M/UL
RBC # FLD: 14 %
RED BLOOD CELLS URINE: 2 /HPF
SODIUM SERPL-SCNC: 142 MMOL/L
SPECIFIC GRAVITY URINE: 1.02
TRIGL SERPL-MCNC: 121 MG/DL
TSH SERPL-ACNC: 2.98 UIU/ML
UROBILINOGEN URINE: 1 MG/DL
WBC # FLD AUTO: 8.5 K/UL
WHITE BLOOD CELLS URINE: 2 /HPF

## 2024-09-18 ENCOUNTER — NON-APPOINTMENT (OUTPATIENT)
Age: 64
End: 2024-09-18

## 2024-09-18 ENCOUNTER — APPOINTMENT (OUTPATIENT)
Dept: UROLOGY | Facility: CLINIC | Age: 64
End: 2024-09-18
Payer: MEDICAID

## 2024-09-18 VITALS
HEART RATE: 94 BPM | SYSTOLIC BLOOD PRESSURE: 157 MMHG | BODY MASS INDEX: 46.01 KG/M2 | WEIGHT: 250 LBS | DIASTOLIC BLOOD PRESSURE: 82 MMHG | HEIGHT: 62 IN

## 2024-09-18 PROCEDURE — 99214 OFFICE O/P EST MOD 30 MIN: CPT | Mod: 25

## 2024-09-18 PROCEDURE — 51798 US URINE CAPACITY MEASURE: CPT

## 2024-09-18 RX ORDER — MIRABEGRON 50 MG/1
50 TABLET, EXTENDED RELEASE ORAL
Qty: 90 | Refills: 3 | Status: ACTIVE | COMMUNITY
Start: 2024-09-18 | End: 1900-01-01

## 2024-09-18 RX ORDER — AZITHROMYCIN 500 MG/1
500 TABLET, FILM COATED ORAL
Qty: 5 | Refills: 0 | Status: ACTIVE | COMMUNITY
Start: 2024-09-18 | End: 1900-01-01

## 2024-09-18 RX ORDER — LORATADINE 10 MG
17 TABLET,DISINTEGRATING ORAL TWICE DAILY
Qty: 6 | Refills: 9 | Status: ACTIVE | COMMUNITY
Start: 2024-09-18 | End: 1900-01-01

## 2024-09-18 RX ORDER — SOLIFENACIN SUCCINATE 10 MG/1
10 TABLET ORAL
Qty: 90 | Refills: 3 | Status: ACTIVE | COMMUNITY
Start: 2024-09-18 | End: 1900-01-01

## 2024-09-18 NOTE — ASSESSMENT
[FreeTextEntry1] : 64F w BMI 45, depression, HTN, HLD, DM2, and SEEMA presents for UUI. Her UUI is likely related to OAB and dietary intake. We agreed to add Vesicare to her mirabegron and focus on decreasing intake of caffeinated and sweetened beverages.  -Mirabegron 50 mg/d -Vesicare 10 mg/d -Decrease intake of caffeinated and sweetened beverages

## 2024-09-18 NOTE — HISTORY OF PRESENT ILLNESS
[FreeTextEntry1] : 64F w BMI 45, depression, HTN, HLD, DM2, and SEEMA presents for UUI.  Spring 2023, developed UUI.  10/25/23, UA unremarkable.  12/6/23, UCx grew GBS, and he was started on keflex. Started on mirabegron 50 mg/d. I also recommended decreasing intake of caffeine and sweetened beverages.  Today, sPVR 12. She reports minimal improvement w mirabegron. She is still drinking a lot of tea.

## 2024-09-24 ENCOUNTER — APPOINTMENT (OUTPATIENT)
Dept: ORTHOPEDIC SURGERY | Facility: CLINIC | Age: 64
End: 2024-09-24
Payer: COMMERCIAL

## 2024-09-24 DIAGNOSIS — M54.16 RADICULOPATHY, LUMBAR REGION: ICD-10-CM

## 2024-09-24 DIAGNOSIS — M54.12 RADICULOPATHY, CERVICAL REGION: ICD-10-CM

## 2024-09-24 PROCEDURE — 99213 OFFICE O/P EST LOW 20 MIN: CPT
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
## 2024-09-24 NOTE — IMAGING
[de-identified] : Spine: Inspection/Palpation: No tenderness to palpation throughout Cervical/thoracic/lumbar spine. except bilatearl traps  No bony stepoffs, No lesions.   Gait: antalgic, utilziing cane   Neurologic: Bilateral upper extremities 5/5 Deltoid/Biceps/Triceps/ Wrist Flexion/Wrist Extension/ / Intrinsics Except Bilateral Lower Extremities 5/5 Iliopsoas/Quadriceps/Hamstrings/ Tibialis Anterior/ Gastrocnemius. Extensor Hallucis Longus/ Flexor Hallucis Longus except   Sensation intact to light touch C5-T1 Sensation intact to light touch L2-S1    Negative Swift's  Ct C spien from preeconic shows degenerative cahgnes no fractures  X-ray Ap/Lateraof lumbar spine were viewed and interpreted.  Normal alignment is maintained without any spondylolisthesis.  L5-s1 disc height loss. no fractures
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
## 2024-10-29 ENCOUNTER — APPOINTMENT (OUTPATIENT)
Dept: ORTHOPEDIC SURGERY | Facility: CLINIC | Age: 64
End: 2024-10-29

## 2024-10-30 ENCOUNTER — APPOINTMENT (OUTPATIENT)
Dept: UROLOGY | Facility: CLINIC | Age: 64
End: 2024-10-30

## 2024-11-30 ENCOUNTER — RESULT REVIEW (OUTPATIENT)
Age: 64
End: 2024-11-30

## 2024-11-30 ENCOUNTER — EMERGENCY (EMERGENCY)
Facility: HOSPITAL | Age: 64
LOS: 1 days | Discharge: HOME | End: 2024-11-30
Attending: EMERGENCY MEDICINE
Payer: MEDICARE

## 2024-11-30 VITALS
OXYGEN SATURATION: 98 % | HEART RATE: 82 BPM | DIASTOLIC BLOOD PRESSURE: 82 MMHG | TEMPERATURE: 98 F | SYSTOLIC BLOOD PRESSURE: 136 MMHG | RESPIRATION RATE: 19 BRPM

## 2024-11-30 VITALS
SYSTOLIC BLOOD PRESSURE: 159 MMHG | RESPIRATION RATE: 20 BRPM | OXYGEN SATURATION: 98 % | WEIGHT: 251.33 LBS | HEIGHT: 60 IN | HEART RATE: 88 BPM | DIASTOLIC BLOOD PRESSURE: 78 MMHG | TEMPERATURE: 98 F

## 2024-11-30 DIAGNOSIS — R07.89 OTHER CHEST PAIN: ICD-10-CM

## 2024-11-30 DIAGNOSIS — R16.2 HEPATOMEGALY WITH SPLENOMEGALY, NOT ELSEWHERE CLASSIFIED: ICD-10-CM

## 2024-11-30 LAB
A1C WITH ESTIMATED AVERAGE GLUCOSE RESULT: 5.9 % — HIGH (ref 4–5.6)
ALBUMIN SERPL ELPH-MCNC: 3.7 G/DL — SIGNIFICANT CHANGE UP (ref 3.3–5.2)
ALP SERPL-CCNC: 113 U/L — SIGNIFICANT CHANGE UP (ref 40–120)
ALT FLD-CCNC: 19 U/L — SIGNIFICANT CHANGE UP
ANION GAP SERPL CALC-SCNC: 12 MMOL/L — SIGNIFICANT CHANGE UP (ref 5–17)
APPEARANCE UR: CLEAR — SIGNIFICANT CHANGE UP
AST SERPL-CCNC: 30 U/L — SIGNIFICANT CHANGE UP
BACTERIA # UR AUTO: NEGATIVE /HPF — SIGNIFICANT CHANGE UP
BASOPHILS # BLD AUTO: 0.05 K/UL — SIGNIFICANT CHANGE UP (ref 0–0.2)
BASOPHILS NFR BLD AUTO: 0.5 % — SIGNIFICANT CHANGE UP (ref 0–2)
BILIRUB SERPL-MCNC: 0.8 MG/DL — SIGNIFICANT CHANGE UP (ref 0.4–2)
BILIRUB UR-MCNC: NEGATIVE — SIGNIFICANT CHANGE UP
BUN SERPL-MCNC: 6.5 MG/DL — LOW (ref 8–20)
CALCIUM SERPL-MCNC: 9.3 MG/DL — SIGNIFICANT CHANGE UP (ref 8.4–10.5)
CAST: 1 /LPF — SIGNIFICANT CHANGE UP (ref 0–4)
CHLORIDE SERPL-SCNC: 99 MMOL/L — SIGNIFICANT CHANGE UP (ref 96–108)
CHOLEST SERPL-MCNC: 158 MG/DL — SIGNIFICANT CHANGE UP
CK SERPL-CCNC: 40 U/L — SIGNIFICANT CHANGE UP (ref 25–170)
CO2 SERPL-SCNC: 25 MMOL/L — SIGNIFICANT CHANGE UP (ref 22–29)
COLOR SPEC: ABNORMAL
CREAT SERPL-MCNC: 0.44 MG/DL — LOW (ref 0.5–1.3)
CRP SERPL-MCNC: 142 MG/L — HIGH
D DIMER BLD IA.RAPID-MCNC: 220 NG/ML DDU — SIGNIFICANT CHANGE UP
DIFF PNL FLD: NEGATIVE — SIGNIFICANT CHANGE UP
EGFR: 107 ML/MIN/1.73M2 — SIGNIFICANT CHANGE UP
EOSINOPHIL # BLD AUTO: 0.1 K/UL — SIGNIFICANT CHANGE UP (ref 0–0.5)
EOSINOPHIL NFR BLD AUTO: 1.1 % — SIGNIFICANT CHANGE UP (ref 0–6)
ERYTHROCYTE [SEDIMENTATION RATE] IN BLOOD: 59 MM/HR — HIGH (ref 0–20)
ESTIMATED AVERAGE GLUCOSE: 123 MG/DL — HIGH (ref 68–114)
GLUCOSE SERPL-MCNC: 117 MG/DL — HIGH (ref 70–99)
GLUCOSE UR QL: NEGATIVE MG/DL — SIGNIFICANT CHANGE UP
HCT VFR BLD CALC: 36.7 % — SIGNIFICANT CHANGE UP (ref 34.5–45)
HCT VFR BLD CALC: 38.7 % — SIGNIFICANT CHANGE UP (ref 34.5–45)
HDLC SERPL-MCNC: 71 MG/DL — SIGNIFICANT CHANGE UP
HGB BLD-MCNC: 12.3 G/DL — SIGNIFICANT CHANGE UP (ref 11.5–15.5)
HGB BLD-MCNC: 13.1 G/DL — SIGNIFICANT CHANGE UP (ref 11.5–15.5)
IMM GRANULOCYTES NFR BLD AUTO: 0.4 % — SIGNIFICANT CHANGE UP (ref 0–0.9)
KETONES UR-MCNC: ABNORMAL MG/DL
LACTATE SERPL-SCNC: 0.9 MMOL/L — SIGNIFICANT CHANGE UP (ref 0.5–2)
LEUKOCYTE ESTERASE UR-ACNC: ABNORMAL
LIDOCAIN IGE QN: 32 U/L — SIGNIFICANT CHANGE UP (ref 22–51)
LIDOCAIN IGE QN: 45 U/L — SIGNIFICANT CHANGE UP (ref 22–51)
LIPID PNL WITH DIRECT LDL SERPL: 71 MG/DL — SIGNIFICANT CHANGE UP
LYMPHOCYTES # BLD AUTO: 1.89 K/UL — SIGNIFICANT CHANGE UP (ref 1–3.3)
LYMPHOCYTES # BLD AUTO: 20.6 % — SIGNIFICANT CHANGE UP (ref 13–44)
MCHC RBC-ENTMCNC: 28.9 PG — SIGNIFICANT CHANGE UP (ref 27–34)
MCHC RBC-ENTMCNC: 29.4 PG — SIGNIFICANT CHANGE UP (ref 27–34)
MCHC RBC-ENTMCNC: 33.5 G/DL — SIGNIFICANT CHANGE UP (ref 32–36)
MCHC RBC-ENTMCNC: 33.9 G/DL — SIGNIFICANT CHANGE UP (ref 32–36)
MCV RBC AUTO: 86.4 FL — SIGNIFICANT CHANGE UP (ref 80–100)
MCV RBC AUTO: 87 FL — SIGNIFICANT CHANGE UP (ref 80–100)
MONOCYTES # BLD AUTO: 0.62 K/UL — SIGNIFICANT CHANGE UP (ref 0–0.9)
MONOCYTES NFR BLD AUTO: 6.8 % — SIGNIFICANT CHANGE UP (ref 2–14)
NEUTROPHILS # BLD AUTO: 6.48 K/UL — SIGNIFICANT CHANGE UP (ref 1.8–7.4)
NEUTROPHILS NFR BLD AUTO: 70.6 % — SIGNIFICANT CHANGE UP (ref 43–77)
NITRITE UR-MCNC: NEGATIVE — SIGNIFICANT CHANGE UP
NON HDL CHOLESTEROL: 87 MG/DL — SIGNIFICANT CHANGE UP
NT-PROBNP SERPL-SCNC: 228 PG/ML — SIGNIFICANT CHANGE UP (ref 0–300)
PH UR: 6 — SIGNIFICANT CHANGE UP (ref 5–8)
PLATELET # BLD AUTO: 226 K/UL — SIGNIFICANT CHANGE UP (ref 150–400)
PLATELET # BLD AUTO: 230 K/UL — SIGNIFICANT CHANGE UP (ref 150–400)
POTASSIUM SERPL-MCNC: 4.5 MMOL/L — SIGNIFICANT CHANGE UP (ref 3.5–5.3)
POTASSIUM SERPL-SCNC: 4.5 MMOL/L — SIGNIFICANT CHANGE UP (ref 3.5–5.3)
PROT SERPL-MCNC: 7.4 G/DL — SIGNIFICANT CHANGE UP (ref 6.6–8.7)
PROT UR-MCNC: SIGNIFICANT CHANGE UP MG/DL
RAPID RVP RESULT: SIGNIFICANT CHANGE UP
RBC # BLD: 4.25 M/UL — SIGNIFICANT CHANGE UP (ref 3.8–5.2)
RBC # BLD: 4.45 M/UL — SIGNIFICANT CHANGE UP (ref 3.8–5.2)
RBC # FLD: 13.2 % — SIGNIFICANT CHANGE UP (ref 10.3–14.5)
RBC # FLD: 13.4 % — SIGNIFICANT CHANGE UP (ref 10.3–14.5)
RBC CASTS # UR COMP ASSIST: 3 /HPF — SIGNIFICANT CHANGE UP (ref 0–4)
SARS-COV-2 RNA SPEC QL NAA+PROBE: SIGNIFICANT CHANGE UP
SODIUM SERPL-SCNC: 136 MMOL/L — SIGNIFICANT CHANGE UP (ref 135–145)
SP GR SPEC: 1.02 — SIGNIFICANT CHANGE UP (ref 1–1.03)
SQUAMOUS # UR AUTO: 3 /HPF — SIGNIFICANT CHANGE UP (ref 0–5)
TRIGL SERPL-MCNC: 81 MG/DL — SIGNIFICANT CHANGE UP
TROPONIN T, HIGH SENSITIVITY RESULT: 6 NG/L — SIGNIFICANT CHANGE UP (ref 0–51)
UROBILINOGEN FLD QL: 1 MG/DL — SIGNIFICANT CHANGE UP (ref 0.2–1)
WBC # BLD: 9.15 K/UL — SIGNIFICANT CHANGE UP (ref 3.8–10.5)
WBC # BLD: 9.18 K/UL — SIGNIFICANT CHANGE UP (ref 3.8–10.5)
WBC # FLD AUTO: 9.15 K/UL — SIGNIFICANT CHANGE UP (ref 3.8–10.5)
WBC # FLD AUTO: 9.18 K/UL — SIGNIFICANT CHANGE UP (ref 3.8–10.5)
WBC UR QL: 2 /HPF — SIGNIFICANT CHANGE UP (ref 0–5)

## 2024-11-30 RX ORDER — CEFDINIR 250 MG/5ML
1 SUSPENSION, RECONSTITUTED, ORAL (ML) ORAL
Qty: 20 | Refills: 0
Start: 2024-11-30 | End: 2024-12-09

## 2024-11-30 RX ORDER — AZITHROMYCIN 250 MG/1
500 TABLET, FILM COATED ORAL ONCE
Refills: 0 | Status: COMPLETED | OUTPATIENT
Start: 2024-11-30 | End: 2024-11-30

## 2024-11-30 RX ORDER — CEFTRIAXONE SODIUM 1 G
1000 VIAL (EA) INJECTION ONCE
Refills: 0 | Status: COMPLETED | OUTPATIENT
Start: 2024-11-30 | End: 2024-11-30

## 2024-11-30 RX ORDER — CEFTRIAXONE SODIUM 1 G
1000 VIAL (EA) INJECTION ONCE
Refills: 0 | Status: DISCONTINUED | OUTPATIENT
Start: 2024-11-30 | End: 2024-11-30

## 2024-11-30 RX ORDER — AZITHROMYCIN 250 MG/1
1 TABLET, FILM COATED ORAL
Qty: 1 | Refills: 0
Start: 2024-11-30 | End: 2024-12-04

## 2024-11-30 RX ADMIN — Medication 1000 MILLIGRAM(S): at 15:05

## 2024-11-30 RX ADMIN — AZITHROMYCIN 255 MILLIGRAM(S): 250 TABLET, FILM COATED ORAL at 15:05

## 2024-11-30 NOTE — ED PROVIDER NOTE - PATIENT PORTAL LINK FT
You can access the FollowMyHealth Patient Portal offered by [facility] by registering at the following website: [email or web address]. By joining [employer]’s FollowMyHealth portal, you will also be able to view your health information using other applications (apps) compatible with our system.
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
## 2024-11-30 NOTE — ED PROVIDER NOTE - NSFOLLOWUPINSTRUCTIONS_ED_ALL_ED_FT
Pneumonia    Pneumonia is an infection of the lungs. Pneumonia may be caused by bacteria, viruses, or funguses. Symptoms include coughing, fever, chest pain when breathing deeply or coughing, shortness of breath, fatigue, or muscle aches. Pneumonia can be diagnosed with a medical history and physical exam, as well as other tests which may include a chest X-ray. If you were prescribed an antibiotic medicine, take it as told by your health care provider and do not stop taking the antibiotic even if you start to feel better. Do not use tobacco products, including cigarettes, chewing tobacco, and e-cigarettes.    SEEK IMMEDIATE MEDICAL CARE IF YOU HAVE ANY OF THE FOLLOWING SYMPTOMS: worsening shortness of breath, worsening chest pain, coughing up blood, change in mental status, lightheadedness/dizziness.    Chest Pain    Chest pain can be caused by many different conditions which may or may not be dangerous. Causes include heartburn, lung infections, heart attack, blood clot in lungs, skin infections, strain or damage to muscle, cartilage, or bones, etc. In addition to a history and physical examination, an electrocardiogram (ECG) or other lab tests may have been performed to determine the cause of your chest pain. Follow up with your primary care provider or with a cardiologist as instructed.     SEEK IMMEDIATE MEDICAL CARE IF YOU HAVE ANY OF THE FOLLOWING SYMPTOMS: worsening chest pain, coughing up blood, unexplained back/neck/jaw pain, severe abdominal pain, dizziness or lightheadedness, fainting, shortness of breath, sweaty or clammy skin, vomiting, or racing heart beat. These symptoms may represent a serious problem that is an emergency. Do not wait to see if the symptoms will go away. Get medical help right away. Call 911 and do not drive yourself to the hospital.    Dolor de pecho inespecífico en los adultos    Nonspecific Chest Pain, Adult    El dolor de pecho puede deberse a muchas afecciones diferentes. Puede ser provocado por henry afección que es potencialmente mortal y requiere tratamiento hospitalario de inmediato. También puede ser provocado por algo que no es potencialmente mortal. Si tiene dolor de pecho, puede ser difícil saber la diferencia, por lo tanto es importante que obtenga ayuda de inmediato para asegurarse de que no tiene henry afección grave.  Algunas causas potencialmente mortales del dolor de pecho incluyen:  Infarto de miocardio.Un desgarro en el vaso sanguíneo principal del cuerpo (disección aórtica).Inflamación alrededor del corazón (pericarditis).Un problema en los pulmones, kiley un coágulo de jocelyn (embolia pulmonar) o un pulmón colapsado (neumotórax).Algunas causas de dolor de pecho que no son potencialmente mortales incluyen:  Acidez estomacal.Ansiedad o estrés.Daño de los huesos, los músculos y los cartílagos que conforman la pared torácica.Neumonía o bronquitis.Culebrilla (virus de la varicela zóster).El dolor de pecho puede provocar las siguientes sensaciones:  Dolor o molestias en la superficie o en lo profundo del pecho.Dolor opresivo, continuo o constrictivo.Ardor u hormigueo.Dolor sordo o intenso que empeora al moverse, toser o inhalar profundamente.Dolor o molestias que también se sienten en la espalda, el ann marie, la mandíbula, el hombro o el brazo, o dolor que se extiende a cualquiera de estas zonas.El dolor de pecho puede aparecer y desaparecer. También puede ser beth. [place] méd[place] le hará análisis clínicos y otros estudios para tratar de determinar la causa del dolor. El tratamiento dependerá de la causa del dolor de pecho.  Siga estas indicaciones en del real casa:  Medicamentos     Reddell los medicamentos de venta giacomo y los recetados solamente kiley se lo haya indicado el médico.Si le recetaron un antibiótico, tómelo o úselo kiley se lo haya indicado el médico. No deje de jose angel los antibióticos aunque comience a sentirse mejor.Estilo de nimesh        Rachel reposo kiley se lo haya indicado el médico.No consuma ningún producto que contenga nicotina o tabaco, kiley cigarrillos y cigarrillos electrónicos. Si necesita ayuda para dejar de fumar, consulte al médico.No dena alcohol.Opte por un estilo de nimesh saludable según lo recomendado. [place] puede incluir lo siguiente:  Practicar actividad física con regularidad. Pida al médico que le sugiera algunas actividades que amna seguras para usted.Seguir henry dieta cardiosaludable. Esta debe incluir muchas frutas y verduras frescas, cereales integrales, proteínas (magras) con bajo contenido de grasa y productos lácteos bajos en grasa. Un nutricionista podrá ayudarlo a hacer elecciones de alimentación saludables.Mantener un peso saludable.Controlar cualquier otra afección que tenga, kiley presión arterial cindi (hipertensión) o diabetes.Disminuir el nivel de estrés; por ejemplo, con yoga o técnicas de relajación.Indicaciones generales     Esté atento a cualquier cambio en los síntomas. Informe al médico sobre estos o si tiene síntomas nuevos.Evite las actividades que le causen dolor de pecho.Concurra a todas las visitas de seguimiento kiley se lo haya indicado el médico. [place] es importante. [place] incluye las visitas para realizarle otros estudios si el dolor de pecho no desaparece.Comuníquese con un médico si:  El dolor de pecho no desaparece.Se siente deprimido.Tiene fiebre.Solicite ayuda inmediatamente si:  El dolor en el pecho empeora.Tiene tos que empeora o tose con jocelyn.Siente un dolor intenso en el abdomen.Se desmaya.Tiene henry molestia repentina e inexplicable en el pecho.Tiene molestias repentinas e inexplicables en los brazos, la espalda, el ann marie o la mandíbula.Le falta el aire en cualquier momento.Comienza a sudar de manera repentina o la piel se le humedece.Siente náuseas o vomita.Se siente repentinamente mareado o se desmaya.Tiene debilidad intensa, o debilidad o fatiga sin explicación.Siente que el corazón comienza a latir rápidamente o que se saltea latidos.Estos síntomas pueden representar un problema grave que constituye henry emergencia. No espere a jos si los síntomas desaparecen. Solicite atención médica de inmediato. Comuníquese con el servicio de emergencias de del real localidad (911 en los [place]). No conduzca por tio propios medios hasta el hospital.   Resumen  El dolor de pecho puede ser provocado por henry afección que es grave y requiere tratamiento urgente. También puede ser provocado por algo que no es potencialmente mortal.Si tiene dolor de pecho, es muy importante que consulte con el médico. El médico podrá hacerle análisis clínicos y otros estudios para tratar de determinar la causa del dolor.Siga las indicaciones de del real médico sobre jose angel medicamentos, hacer cambios en del real estilo de nimesh y recibir tratamiento de urgencia si los síntomas empeoran.Concurra a todas las visitas de seguimiento kiley se lo haya indicado el médico. [place] incluye las visitas para realizarle otros estudios si el dolor de pecho no desaparece.Esta información no tiene kiley fin reemplazar el consejo del médico. Asegúrese de hacerle al médico cualquier pregunta que tenga.

## 2024-11-30 NOTE — ED ADULT NURSE NOTE - NSFALLUNIVINTERV_ED_ALL_ED
Bed/Stretcher in lowest position, wheels locked, appropriate side rails in place/Call bell, personal items and telephone in reach/Instruct patient to call for assistance before getting out of bed/chair/stretcher/Non-slip footwear applied when patient is off stretcher/[place] to call system/Physically safe environment - no spills, clutter or unnecessary equipment/Purposeful proactive rounding/Room/bathroom lighting operational, light cord in reach

## 2024-11-30 NOTE — ED PROVIDER NOTE - NSPTACCESSSVCSAPPTDETAILS_ED_ALL_ED_FT
Pulm - for chest pain if it persists.  GI - for incidental liver findings on CT imaging - hepatomegaly, nodularity, possible cirrhosis
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
## 2024-11-30 NOTE — CONSULT NOTE ADULT - PROBLEM SELECTOR RECOMMENDATION 9
- pt w/ chest pain starting @ 3AM  - chest pain started after waking up and using the bathroom, when patient returned to bed she noticed left chest pain radiating to left flank/left back   - she thought it was gas, stated she drank some soda and it partially alleviated her pain but the pain persisted so she came to the ED  - of note she has been sick for 2 weeks with a viral illness and cough, cannot exclude pericarditis from differential, check esr, crp, lactate, wbc, RVP, and EKG   - ekg is mostly normal, there is poor r wave progression that is unchanged from prior. no st segment evidence of ischemia or infarct.  - cannot exclude PE. pt meets well's criteria. hypoxic w/ chest pain. check d-dimer. if positive check CCTA  - cannot exclude CAD pt has class III obesity w/ pre-DM, HLD, HTN, sedentary. trend trop, check serial ekg, check echo. if normal will need outpatient stress.   - pt has hx of pulmonary nodule 1.8 cm in the lingula, there was also a mild peripheral ground glass opacity distal to the nodule. pt has not followed up w/ CTS for surveillance. Likely should also follow w/ pulmonary. differential initially included hamartoma vs carcinoid turmor - pt w/ chest pain starting @ 3AM  - chest pain started after waking up and using the bathroom, when patient returned to bed she noticed left chest pain radiating to left flank/left back   - she thought it was gas, stated she drank some soda and it partially alleviated her pain but the pain persisted so she came to the ED  - of note she has been sick for 2 weeks with a viral illness and cough, cannot exclude pericarditis from differential, check esr, crp, lactate, wbc, RVP, and EKG   - ekg is mostly normal, there is poor r wave progression that is unchanged from prior. no st segment evidence of ischemia or infarct.  - cannot exclude PE. pt meets well's criteria. hypoxic w/ chest pain. check d-dimer. if positive check CTA PE protocol   - cannot exclude CAD pt has class III obesity w/ pre-DM, HLD, HTN, sedentary. trend trop, check serial ekg, check echo. if normal will need outpatient stress.   - pt has hx of pulmonary nodule 1.8 cm in the lingula, there was also a mild peripheral ground glass opacity distal to the nodule. pt has not followed up w/ CTS for surveillance. Likely should also follow w/ pulmonary. differential initially included hamartoma vs carcinoid turmor

## 2024-11-30 NOTE — ED ADULT TRIAGE NOTE - ISOLATION TYPE:
Procedures   This Olu A Collins was injected with Botox using the migraine paradigm.  The patient tolerated the injections without difficulty.    .The patient received 20 u of Botox in Right and Left frontalis, two sites each.  The patient received 40 units of Botox in the right and left temporalis in 4 sites each.  Patient  received 30 units of Botox in the right and left occipitalis in  3 sites each.  Patient  received 20 units of Botox in the right and left paraspinal in 2 sites each.  The patient received 30 units of Botox in the right and left trapezius in 3 sites each.  The total amount of Botox received was 155 units.    10/26/2018    Mauricio Love MD  
None

## 2024-11-30 NOTE — ED PROVIDER NOTE - ATTENDING CONTRIBUTION TO CARE
Seen with resident.  [place] adult female with recent URI and cough, now with left-sided pleuritic pain, worsening over several days.  Denies fever, denies shortness of breath.  Had prior lung nodule on the left side as well.  On exam, well-appearing, no acute distress, no respiratory distress, clear lungs on auscultation.  Normal room air saturation.  No JVD.  Abdomen soft nontender.  No pedal edema.  Labs and imaging reviewed.  Cards consult noted.  Echo noted.  Etiology unlikely to be cardiac in nature.  Findings consistent with possible pneumonia.  Will treat with appropriate antibiotics.  Patient appropriate for outpatient treatment with precautions for return.  Will give appropriate outpatient follow-up with thoracic surgery and pulmonology.

## 2024-11-30 NOTE — ED PROVIDER NOTE - WR ORDER NAME 1
Xray Chest 1 View- PORTABLE-Urgent Mercedes Flap Text: The defect edges were debeveled with a #15 scalpel blade.  Given the location of the defect, shape of the defect and the proximity to free margins a Mercedes flap was deemed most appropriate.  Using a sterile surgical marker, an appropriate advancement flap was drawn incorporating the defect and placing the expected incisions within the relaxed skin tension lines where possible. The area thus outlined was incised deep to adipose tissue with a #15 scalpel blade.  The skin margins were undermined to an appropriate distance in all directions utilizing iris scissors.

## 2024-12-01 LAB
CULTURE RESULTS: SIGNIFICANT CHANGE UP
SPECIMEN SOURCE: SIGNIFICANT CHANGE UP

## 2024-12-02 ENCOUNTER — INPATIENT (INPATIENT)
Facility: HOSPITAL | Age: 64
LOS: 3 days | Discharge: HOSPICE | DRG: 204 | End: 2024-12-06
Attending: INTERNAL MEDICINE | Admitting: STUDENT IN AN ORGANIZED HEALTH CARE EDUCATION/TRAINING PROGRAM
Payer: MEDICARE

## 2024-12-02 VITALS
HEART RATE: 83 BPM | HEIGHT: 60 IN | TEMPERATURE: 98 F | WEIGHT: 253.53 LBS | OXYGEN SATURATION: 97 % | DIASTOLIC BLOOD PRESSURE: 61 MMHG | SYSTOLIC BLOOD PRESSURE: 123 MMHG | RESPIRATION RATE: 18 BRPM

## 2024-12-02 DIAGNOSIS — R07.81 PLEURODYNIA: ICD-10-CM

## 2024-12-02 DIAGNOSIS — R91.8 OTHER NONSPECIFIC ABNORMAL FINDING OF LUNG FIELD: ICD-10-CM

## 2024-12-02 DIAGNOSIS — R07.9 CHEST PAIN, UNSPECIFIED: ICD-10-CM

## 2024-12-02 DIAGNOSIS — Z01.810 ENCOUNTER FOR PREPROCEDURAL CARDIOVASCULAR EXAMINATION: ICD-10-CM

## 2024-12-02 DIAGNOSIS — R91.1 SOLITARY PULMONARY NODULE: ICD-10-CM

## 2024-12-02 DIAGNOSIS — Z98.89 OTHER SPECIFIED POSTPROCEDURAL STATES: Chronic | ICD-10-CM

## 2024-12-02 LAB
ALBUMIN SERPL ELPH-MCNC: 3.6 G/DL — SIGNIFICANT CHANGE UP (ref 3.3–5.2)
ALP SERPL-CCNC: 115 U/L — SIGNIFICANT CHANGE UP (ref 40–120)
ALT FLD-CCNC: 21 U/L — SIGNIFICANT CHANGE UP
ANION GAP SERPL CALC-SCNC: 13 MMOL/L — SIGNIFICANT CHANGE UP (ref 5–17)
APTT BLD: 36.2 SEC — HIGH (ref 24.5–35.6)
AST SERPL-CCNC: 27 U/L — SIGNIFICANT CHANGE UP
BASOPHILS # BLD AUTO: 0.04 K/UL — SIGNIFICANT CHANGE UP (ref 0–0.2)
BASOPHILS NFR BLD AUTO: 0.5 % — SIGNIFICANT CHANGE UP (ref 0–2)
BILIRUB SERPL-MCNC: 0.4 MG/DL — SIGNIFICANT CHANGE UP (ref 0.4–2)
BUN SERPL-MCNC: 11.4 MG/DL — SIGNIFICANT CHANGE UP (ref 8–20)
CALCIUM SERPL-MCNC: 9.1 MG/DL — SIGNIFICANT CHANGE UP (ref 8.4–10.5)
CHLORIDE SERPL-SCNC: 102 MMOL/L — SIGNIFICANT CHANGE UP (ref 96–108)
CO2 SERPL-SCNC: 26 MMOL/L — SIGNIFICANT CHANGE UP (ref 22–29)
CREAT SERPL-MCNC: 0.54 MG/DL — SIGNIFICANT CHANGE UP (ref 0.5–1.3)
EGFR: 101 ML/MIN/1.73M2 — SIGNIFICANT CHANGE UP
EOSINOPHIL # BLD AUTO: 0.19 K/UL — SIGNIFICANT CHANGE UP (ref 0–0.5)
EOSINOPHIL NFR BLD AUTO: 2.2 % — SIGNIFICANT CHANGE UP (ref 0–6)
GLUCOSE SERPL-MCNC: 100 MG/DL — HIGH (ref 70–99)
HCT VFR BLD CALC: 39.2 % — SIGNIFICANT CHANGE UP (ref 34.5–45)
HGB BLD-MCNC: 13 G/DL — SIGNIFICANT CHANGE UP (ref 11.5–15.5)
IMM GRANULOCYTES NFR BLD AUTO: 0.3 % — SIGNIFICANT CHANGE UP (ref 0–0.9)
INR BLD: 1.1 RATIO — SIGNIFICANT CHANGE UP (ref 0.85–1.16)
LYMPHOCYTES # BLD AUTO: 2.25 K/UL — SIGNIFICANT CHANGE UP (ref 1–3.3)
LYMPHOCYTES # BLD AUTO: 26 % — SIGNIFICANT CHANGE UP (ref 13–44)
MCHC RBC-ENTMCNC: 29 PG — SIGNIFICANT CHANGE UP (ref 27–34)
MCHC RBC-ENTMCNC: 33.2 G/DL — SIGNIFICANT CHANGE UP (ref 32–36)
MCV RBC AUTO: 87.5 FL — SIGNIFICANT CHANGE UP (ref 80–100)
MONOCYTES # BLD AUTO: 0.52 K/UL — SIGNIFICANT CHANGE UP (ref 0–0.9)
MONOCYTES NFR BLD AUTO: 6 % — SIGNIFICANT CHANGE UP (ref 2–14)
NEUTROPHILS # BLD AUTO: 5.61 K/UL — SIGNIFICANT CHANGE UP (ref 1.8–7.4)
NEUTROPHILS NFR BLD AUTO: 65 % — SIGNIFICANT CHANGE UP (ref 43–77)
NT-PROBNP SERPL-SCNC: 208 PG/ML — SIGNIFICANT CHANGE UP (ref 0–300)
PLATELET # BLD AUTO: 273 K/UL — SIGNIFICANT CHANGE UP (ref 150–400)
POTASSIUM SERPL-MCNC: 4.1 MMOL/L — SIGNIFICANT CHANGE UP (ref 3.5–5.3)
POTASSIUM SERPL-SCNC: 4.1 MMOL/L — SIGNIFICANT CHANGE UP (ref 3.5–5.3)
PROT SERPL-MCNC: 7.1 G/DL — SIGNIFICANT CHANGE UP (ref 6.6–8.7)
PROTHROM AB SERPL-ACNC: 12.4 SEC — SIGNIFICANT CHANGE UP (ref 9.9–13.4)
RBC # BLD: 4.48 M/UL — SIGNIFICANT CHANGE UP (ref 3.8–5.2)
RBC # FLD: 13.2 % — SIGNIFICANT CHANGE UP (ref 10.3–14.5)
SODIUM SERPL-SCNC: 141 MMOL/L — SIGNIFICANT CHANGE UP (ref 135–145)
TROPONIN T, HIGH SENSITIVITY RESULT: <6 NG/L — SIGNIFICANT CHANGE UP (ref 0–51)
WBC # BLD: 8.64 K/UL — SIGNIFICANT CHANGE UP (ref 3.8–10.5)
WBC # FLD AUTO: 8.64 K/UL — SIGNIFICANT CHANGE UP (ref 3.8–10.5)

## 2024-12-02 PROCEDURE — 99285 EMERGENCY DEPT VISIT HI MDM: CPT | Mod: GC

## 2024-12-02 PROCEDURE — 71275 CT ANGIOGRAPHY CHEST: CPT | Mod: 26,MC

## 2024-12-02 RX ORDER — PERPHENAZINE 4 MG
4 TABLET ORAL EVERY 12 HOURS
Refills: 0 | Status: DISCONTINUED | OUTPATIENT
Start: 2024-12-02 | End: 2024-12-06

## 2024-12-02 RX ORDER — OXYBUTYNIN CHLORIDE 5 MG
10 TABLET ORAL DAILY
Refills: 0 | Status: DISCONTINUED | OUTPATIENT
Start: 2024-12-02 | End: 2024-12-02

## 2024-12-02 RX ORDER — IPRATROPIUM BROMIDE AND ALBUTEROL SULFATE 2.5; .5 MG/3ML; MG/3ML
3 SOLUTION RESPIRATORY (INHALATION) ONCE
Refills: 0 | Status: COMPLETED | OUTPATIENT
Start: 2024-12-02 | End: 2024-12-02

## 2024-12-02 RX ORDER — OXYBUTYNIN CHLORIDE 5 MG
10 TABLET ORAL DAILY
Refills: 0 | Status: DISCONTINUED | OUTPATIENT
Start: 2024-12-02 | End: 2024-12-06

## 2024-12-02 RX ORDER — ESCITALOPRAM OXALATE 10 MG/1
10 TABLET, FILM COATED ORAL DAILY
Refills: 0 | Status: DISCONTINUED | OUTPATIENT
Start: 2024-12-02 | End: 2024-12-06

## 2024-12-02 RX ORDER — ACETAMINOPHEN 500MG 500 MG/1
650 TABLET, COATED ORAL EVERY 6 HOURS
Refills: 0 | Status: DISCONTINUED | OUTPATIENT
Start: 2024-12-02 | End: 2024-12-06

## 2024-12-02 RX ORDER — CEFPODOXIME PROXETIL 100 MG/5ML
200 GRANULE, FOR SUSPENSION ORAL
Refills: 0 | Status: DISCONTINUED | OUTPATIENT
Start: 2024-12-02 | End: 2024-12-03

## 2024-12-02 RX ORDER — GLUCAGON INJECTION, SOLUTION 0.5 MG/.1ML
1 INJECTION, SOLUTION SUBCUTANEOUS ONCE
Refills: 0 | Status: DISCONTINUED | OUTPATIENT
Start: 2024-12-02 | End: 2024-12-06

## 2024-12-02 RX ORDER — IPRATROPIUM BROMIDE AND ALBUTEROL SULFATE 2.5; .5 MG/3ML; MG/3ML
3 SOLUTION RESPIRATORY (INHALATION) EVERY 6 HOURS
Refills: 0 | Status: DISCONTINUED | OUTPATIENT
Start: 2024-12-02 | End: 2024-12-06

## 2024-12-02 RX ORDER — AZITHROMYCIN 250 MG/1
500 TABLET, FILM COATED ORAL EVERY 24 HOURS
Refills: 0 | Status: DISCONTINUED | OUTPATIENT
Start: 2024-12-03 | End: 2024-12-06

## 2024-12-02 RX ORDER — SODIUM CHLORIDE 9 MG/ML
3 INJECTION, SOLUTION INTRAMUSCULAR; INTRAVENOUS; SUBCUTANEOUS EVERY 8 HOURS
Refills: 0 | Status: DISCONTINUED | OUTPATIENT
Start: 2024-12-02 | End: 2024-12-06

## 2024-12-02 RX ORDER — ACETAMINOPHEN, DIPHENHYDRAMINE HCL, PHENYLEPHRINE HCL 325; 25; 5 MG/1; MG/1; MG/1
3 TABLET ORAL AT BEDTIME
Refills: 0 | Status: DISCONTINUED | OUTPATIENT
Start: 2024-12-02 | End: 2024-12-06

## 2024-12-02 RX ORDER — 0.9 % SODIUM CHLORIDE 0.9 %
1000 INTRAVENOUS SOLUTION INTRAVENOUS
Refills: 0 | Status: DISCONTINUED | OUTPATIENT
Start: 2024-12-02 | End: 2024-12-06

## 2024-12-02 RX ORDER — AMLODIPINE BESYLATE 10 MG/1
10 TABLET ORAL DAILY
Refills: 0 | Status: DISCONTINUED | OUTPATIENT
Start: 2024-12-02 | End: 2024-12-06

## 2024-12-02 RX ORDER — DEXAMETHASONE 1.5 MG/1
10 TABLET ORAL ONCE
Refills: 0 | Status: COMPLETED | OUTPATIENT
Start: 2024-12-02 | End: 2024-12-02

## 2024-12-02 RX ORDER — MAGNESIUM, ALUMINUM HYDROXIDE 200-225/5
30 SUSPENSION, ORAL (FINAL DOSE FORM) ORAL EVERY 4 HOURS
Refills: 0 | Status: DISCONTINUED | OUTPATIENT
Start: 2024-12-02 | End: 2024-12-06

## 2024-12-02 RX ORDER — ONDANSETRON HYDROCHLORIDE 4 MG/1
4 TABLET, FILM COATED ORAL EVERY 8 HOURS
Refills: 0 | Status: DISCONTINUED | OUTPATIENT
Start: 2024-12-02 | End: 2024-12-06

## 2024-12-02 RX ADMIN — IPRATROPIUM BROMIDE AND ALBUTEROL SULFATE 3 MILLILITER(S): 2.5; .5 SOLUTION RESPIRATORY (INHALATION) at 16:17

## 2024-12-02 RX ADMIN — SODIUM CHLORIDE 3 MILLILITER(S): 9 INJECTION, SOLUTION INTRAMUSCULAR; INTRAVENOUS; SUBCUTANEOUS at 22:43

## 2024-12-02 RX ADMIN — IPRATROPIUM BROMIDE AND ALBUTEROL SULFATE 3 MILLILITER(S): 2.5; .5 SOLUTION RESPIRATORY (INHALATION) at 15:51

## 2024-12-02 RX ADMIN — DEXAMETHASONE 102 MILLIGRAM(S): 1.5 TABLET ORAL at 15:50

## 2024-12-02 NOTE — ED PROVIDER NOTE - ATTENDING CONTRIBUTION TO CARE
Fior EMMANUELPT-13-hfdx-old female with history of hypertension hyperlipidemia borderline diabetes presents with 1 week of cough and persistent left-sided pleuritic chest pain on cefdinir and azithromycin and had a CT chest done which showed some nodules.  Patient was sent by pulmonologist for CTA chest to rule out PE given patient has persistent shortness of breath and chest pain.  Patient had no known sick contacts or exposure.  No recent travel.  Patient lives alone close to the daughter and almost sees her daughter every day.  Daughter is interpreting over the phone patient speaks [place]    Patient is alert well-appearing Iraqi-speaking female obese, S1-S2 normal regular, left-sided profuse wheezing with moderate aeration right side is clear not midline trachea, no JVD no pedal edema, abdomen soft nontender nondistended, neuroexam alert oriented x 3 no focal deficits, skin warm dry good turgor    Patient is having persistent shortness of breath and left pleuritic chest pain with persistent wheezing on the left chest will treat her with DuoNebs Decadron rule out PE with CTA would also do lower troponin and proBNP for risk stratification

## 2024-12-02 NOTE — ED ADULT NURSE NOTE - NSFALLRISKASMT_ED_ALL_ED_DT
02-Dec-2024 15:04 Acitretin Pregnancy And Lactation Text: This medication is Pregnancy Category X and should not be given to women who are pregnant or may become pregnant in the future. This medication is excreted in breast milk.

## 2024-12-02 NOTE — ED ADULT TRIAGE NOTE - CHIEF COMPLAINT QUOTE
pt sent in for DX PNA , sent in by Dr Nicole to R/O PE needs CTA, also 1.8cm nodule r/o CA  A&Ox3, resp wnl, family of Dr Lawrence

## 2024-12-02 NOTE — CONSULT NOTE ADULT - PROBLEM SELECTOR RECOMMENDATION 2
.  - Cardiac Risk Stratification for Procedure (possibe bronch)  - METS >4  - RCRI Risk Stratification: Class I Risk, 6%   Risk of Major Cardiac Event  - TTE without abnormality  - EKG without ischemia  - Will update risk stratification post exercise stress

## 2024-12-02 NOTE — ED ADULT NURSE REASSESSMENT NOTE - NS ED NURSE REASSESS COMMENT FT1
Report given to CDU RN. Pt moved to U _6R_______ . Pt placed on telebox. Patient awake and alert, respirations even and unlabored, in no apparent distress.  Plan, abnormal labs, history of present illness, pending labs/tests explained, opportunity to answer questions provided.
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
## 2024-12-02 NOTE — CONSULT NOTE ADULT - NS ATTEND AMEND GEN_ALL_CORE FT
64 year old female w/ PMHx: prediabetes, pulmonary nodule who presents for chest pain    chest pain  prediabetes  pulmonary nodule  rash      Pre-diabetic, pulmonary nodule, chest pain    Plan for Nuclear stress test in AM, no history of ischemic workup  2d TTE recently done reviewed  patient agrees to proceed with stress test given intermediate pretest probability  ekg no evidence of ischemia  negative cardiac enzyme    npo at midnight, discussed with ER attending Dr. Childress and Thoracic surgery PA.    further plan pending Stress test in AM

## 2024-12-02 NOTE — ED PROVIDER NOTE - PHYSICAL EXAMINATION
General: NAD, well appearing  HEENT: Normocephalic, atraumatic, moist mucous membranes   Neck: No apparent stiffness or JVD  Pulm: Chest wall symmetric and nontender, lungs clear to ascultation, no wheezes, rhales, FREDI  Cardiac: Regular rate and regular rhythm, 2+ radial pulses bilaterally  Abdomen: Soft, nontender, nondistended, obese  Skin: Skin is warm, dry and intact without rashes or lesions.  Neuro: No motor or sensory deficits above reported baseline  MSK: No deformity or tenderness above reported baseline

## 2024-12-02 NOTE — ED ADULT NURSE NOTE - NSFALLUNIVINTERV_ED_ALL_ED
Bed/Stretcher in lowest position, wheels locked, appropriate side rails in place/Call bell, personal items and telephone in reach/Instruct patient to call for assistance before getting out of bed/chair/stretcher/Non-slip footwear applied when patient is off stretcher/[place] to call system/Physically safe environment - no spills, clutter or unnecessary equipment/Purposeful proactive rounding/Room/bathroom lighting operational, light cord in reach

## 2024-12-02 NOTE — CONSULT NOTE ADULT - PROBLEM SELECTOR RECOMMENDATION 9
.  - atypical chest pain, worsened with inspiration and movement  - EKG NSR no ischemic changes  - LVEF 71% no RWMA  - hsTnT negative  - pending pulm consult, pending CTA to rule out PE  - will assess exercise stress tomorrow for further evaluation, patient is agreeable  - also found with long nodule on CT scan, seen by thoracic team, may need inpatient bronch
Known lingular nodule. Followed by Dr. Pa as ouptatient  Now with PNA, CT chest revealed consolidation at lingula.  Pending repeat CT chest r/o PE.   Would recommend bronchoscopy for further evaluation of lingular nodule  If patient requires admission can plan for inpatient bronchoscopy  If patient does not require admission will plan for scheduled bronchoscopy as outpatient and patient may follow up with Dr. Pa in CT surgery office.  Continue antibiotics for treatment of PNA  D/w Dr. Pa
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
## 2024-12-02 NOTE — ED ADULT NURSE NOTE - OBJECTIVE STATEMENT
pt recently diagnosed with PNA 2 days ago here today for worsening L sided pleuritic chest pain. Pt went home on ABX 2 days ago and pain has not improved. Pt denies fevers.

## 2024-12-02 NOTE — ED ADULT NURSE REASSESSMENT NOTE - NS ED NURSE REASSESS COMMENT FT1
Assumed care of patient at 1915, report received from off-going RN. Respirations even and unlabored, resting in ED stretcher, NAD. Wheels locked, bed in lowest position, Pending in-patient bed  CM in NSR and  @ 93-94% on RA

## 2024-12-03 ENCOUNTER — RESULT REVIEW (OUTPATIENT)
Age: 64
End: 2024-12-03

## 2024-12-03 DIAGNOSIS — Z01.810 ENCOUNTER FOR PREPROCEDURAL CARDIOVASCULAR EXAMINATION: ICD-10-CM

## 2024-12-03 LAB
ANION GAP SERPL CALC-SCNC: 12 MMOL/L — SIGNIFICANT CHANGE UP (ref 5–17)
BUN SERPL-MCNC: 12.6 MG/DL — SIGNIFICANT CHANGE UP (ref 8–20)
CALCIUM SERPL-MCNC: 9 MG/DL — SIGNIFICANT CHANGE UP (ref 8.4–10.5)
CHLORIDE SERPL-SCNC: 103 MMOL/L — SIGNIFICANT CHANGE UP (ref 96–108)
CO2 SERPL-SCNC: 22 MMOL/L — SIGNIFICANT CHANGE UP (ref 22–29)
CREAT SERPL-MCNC: 0.67 MG/DL — SIGNIFICANT CHANGE UP (ref 0.5–1.3)
EGFR: 96 ML/MIN/1.73M2 — SIGNIFICANT CHANGE UP
GLUCOSE BLDC GLUCOMTR-MCNC: 192 MG/DL — HIGH (ref 70–99)
GLUCOSE BLDC GLUCOMTR-MCNC: 212 MG/DL — HIGH (ref 70–99)
GLUCOSE BLDC GLUCOMTR-MCNC: 225 MG/DL — HIGH (ref 70–99)
GLUCOSE BLDC GLUCOMTR-MCNC: 242 MG/DL — HIGH (ref 70–99)
GLUCOSE BLDC GLUCOMTR-MCNC: 249 MG/DL — HIGH (ref 70–99)
GLUCOSE SERPL-MCNC: 314 MG/DL — HIGH (ref 70–99)
HCT VFR BLD CALC: 36.7 % — SIGNIFICANT CHANGE UP (ref 34.5–45)
HGB BLD-MCNC: 12.6 G/DL — SIGNIFICANT CHANGE UP (ref 11.5–15.5)
MAGNESIUM SERPL-MCNC: 2.2 MG/DL — SIGNIFICANT CHANGE UP (ref 1.6–2.6)
MCHC RBC-ENTMCNC: 29.5 PG — SIGNIFICANT CHANGE UP (ref 27–34)
MCHC RBC-ENTMCNC: 34.3 G/DL — SIGNIFICANT CHANGE UP (ref 32–36)
MCV RBC AUTO: 85.9 FL — SIGNIFICANT CHANGE UP (ref 80–100)
PHOSPHATE SERPL-MCNC: 2.6 MG/DL — SIGNIFICANT CHANGE UP (ref 2.4–4.7)
PLATELET # BLD AUTO: 283 K/UL — SIGNIFICANT CHANGE UP (ref 150–400)
POTASSIUM SERPL-MCNC: 4.6 MMOL/L — SIGNIFICANT CHANGE UP (ref 3.5–5.3)
POTASSIUM SERPL-SCNC: 4.6 MMOL/L — SIGNIFICANT CHANGE UP (ref 3.5–5.3)
PROCALCITONIN SERPL-MCNC: 0.09 NG/ML — SIGNIFICANT CHANGE UP (ref 0.02–0.1)
RBC # BLD: 4.27 M/UL — SIGNIFICANT CHANGE UP (ref 3.8–5.2)
RBC # FLD: 13.2 % — SIGNIFICANT CHANGE UP (ref 10.3–14.5)
SODIUM SERPL-SCNC: 137 MMOL/L — SIGNIFICANT CHANGE UP (ref 135–145)
WBC # BLD: 7.17 K/UL — SIGNIFICANT CHANGE UP (ref 3.8–10.5)
WBC # FLD AUTO: 7.17 K/UL — SIGNIFICANT CHANGE UP (ref 3.8–10.5)

## 2024-12-03 PROCEDURE — 93016 CV STRESS TEST SUPVJ ONLY: CPT

## 2024-12-03 PROCEDURE — 99222 1ST HOSP IP/OBS MODERATE 55: CPT

## 2024-12-03 PROCEDURE — 78452 HT MUSCLE IMAGE SPECT MULT: CPT | Mod: 26

## 2024-12-03 PROCEDURE — 93018 CV STRESS TEST I&R ONLY: CPT

## 2024-12-03 PROCEDURE — 99223 1ST HOSP IP/OBS HIGH 75: CPT

## 2024-12-03 PROCEDURE — 93970 EXTREMITY STUDY: CPT | Mod: 26

## 2024-12-03 PROCEDURE — 99233 SBSQ HOSP IP/OBS HIGH 50: CPT | Mod: 25

## 2024-12-03 RX ORDER — CEFTRIAXONE SODIUM 1 G
1000 VIAL (EA) INJECTION EVERY 24 HOURS
Refills: 0 | Status: DISCONTINUED | OUTPATIENT
Start: 2024-12-03 | End: 2024-12-06

## 2024-12-03 RX ORDER — HEPARIN SODIUM,PORCINE 1000/ML
5000 VIAL (ML) INJECTION EVERY 8 HOURS
Refills: 0 | Status: COMPLETED | OUTPATIENT
Start: 2024-12-03 | End: 2024-12-03

## 2024-12-03 RX ORDER — CEFTRIAXONE SODIUM 1 G
1000 VIAL (EA) INJECTION EVERY 24 HOURS
Refills: 0 | Status: DISCONTINUED | OUTPATIENT
Start: 2024-12-03 | End: 2024-12-03

## 2024-12-03 RX ADMIN — Medication 4: at 18:21

## 2024-12-03 RX ADMIN — Medication 5000 UNIT(S): at 05:15

## 2024-12-03 RX ADMIN — CEFPODOXIME PROXETIL 200 MILLIGRAM(S): 100 GRANULE, FOR SUSPENSION ORAL at 05:21

## 2024-12-03 RX ADMIN — Medication 4 MILLIGRAM(S): at 18:40

## 2024-12-03 RX ADMIN — AMLODIPINE BESYLATE 10 MILLIGRAM(S): 10 TABLET ORAL at 05:16

## 2024-12-03 RX ADMIN — Medication 10 MILLIGRAM(S): at 12:42

## 2024-12-03 RX ADMIN — Medication 4: at 03:06

## 2024-12-03 RX ADMIN — Medication 1000 MILLIGRAM(S): at 12:43

## 2024-12-03 RX ADMIN — Medication 5000 UNIT(S): at 14:09

## 2024-12-03 RX ADMIN — Medication 4 MILLIGRAM(S): at 05:14

## 2024-12-03 RX ADMIN — SODIUM CHLORIDE 3 MILLILITER(S): 9 INJECTION, SOLUTION INTRAMUSCULAR; INTRAVENOUS; SUBCUTANEOUS at 13:33

## 2024-12-03 RX ADMIN — Medication 4: at 11:06

## 2024-12-03 RX ADMIN — AZITHROMYCIN 255 MILLIGRAM(S): 250 TABLET, FILM COATED ORAL at 02:48

## 2024-12-03 RX ADMIN — SODIUM CHLORIDE 3 MILLILITER(S): 9 INJECTION, SOLUTION INTRAMUSCULAR; INTRAVENOUS; SUBCUTANEOUS at 07:41

## 2024-12-03 RX ADMIN — ESCITALOPRAM OXALATE 10 MILLIGRAM(S): 10 TABLET, FILM COATED ORAL at 12:42

## 2024-12-03 RX ADMIN — Medication 5000 UNIT(S): at 21:32

## 2024-12-03 RX ADMIN — SODIUM CHLORIDE 3 MILLILITER(S): 9 INJECTION, SOLUTION INTRAMUSCULAR; INTRAVENOUS; SUBCUTANEOUS at 21:05
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
## 2024-12-03 NOTE — H&P ADULT - NSHPLANGPREFER_GEN_A_CORE
Spoke with pharmacist Galilea, and clarified that patient inject Fiasp with meals plus correction scale, RX corrected to disp 30 ml with 3 refills.    [place]
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
## 2024-12-03 NOTE — CHART NOTE - NSCHARTNOTEFT_GEN_A_CORE
Assumed care from admitting physician. Sonny was seen and evaluated in morning with inperson  and Dr. Olivas at beside    # left Chest pain  -S/p stress test. Pending result  -Pain medication as needed    # Left lingula infiltarte  # Post obstructive pneumonia  -Finding consistent with post obstructive pneumonia  -CT sugery is following  -Plan for brochoscopy on 12/05  -Started on IV ceftriaxone and azithromycin  -Trend Procal tomorrow    # SOB  -CTA did not report PE  -US doppler is peneding  -Dr. Olivas is following      Rest of the plan per H&P

## 2024-12-03 NOTE — CHART NOTE - NSCHARTNOTEFT_GEN_A_CORE
Attempted to see patient   known to service with lung nodule     currently undergoing stress test with cards   harshad nurse harshad cards     tentatively booked for 12/4 afternoon for bronchoscopy   pending cardiac clearance note

## 2024-12-03 NOTE — PROGRESS NOTE ADULT - PROBLEM SELECTOR PLAN 2
- pt has hx of pulmonary nodule 1.8 cm in the lingula, there was also a mild peripheral ground glass opacity distal to the nodule. differential initially included hamartoma vs carcinoid turmor.  - CT chest shows left lingular and RLL consolidation   - on IV ABX azithromycin and ceftriaxone  - on beta agonists and IV steroids  - still with persistent inspiratory chest pain  - thoracic planning bronchoscopy on thursday, keep NPO @ [facility] tomorrow evening, risk stratification as above, no cardiac contraindications, normal NST   - further workup/management as per pulmonary/ thoracic surgery

## 2024-12-03 NOTE — PATIENT PROFILE ADULT - FALL HARM RISK - DEVICES
Progress Note      7/21/2022 9:31 AM  NAME: Oscar San   MRN:  [id number]   Admit Diagnosis: Abnormal cardiac valve [Q24.8]  AS (aortic stenosis) [I35.0]                  Assessment:      S/p TAVR  Left groin pseudoaneurysm    - Cath in [facility] 2001 unclear details, PCI, left with  of RCA. Cath 6/2022 with 0ccluded RCA. 99% proximal LCx. 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 Tammie Kraus  - IBS follows with GI  - Anxiety  - Anemia  - Mild dementia  - CONTRAST ALLERGY  -  7/2020, lives alone in an apartment, 3 kids, one son lives in [place], retired , limited functional capacity uses a cane limited by back pain  DNR                     Plan:        S/p TAVR  Left femoral artery pseudoaneurysm for injection by IR today     - Cont warfarin for goal INR 2-3, currently 1.2  - Cont added clopidogrel for six months until 12/14/2022 for PCI  - Cont metoprolol  - Cont Lasix, K, taper off O2  - Cont atorvastatin     Anemia, add iron and colace    Repeat femoral ultrasound Friday AM with plan for discharge  Taper O2, has home O2  Has attentive son and home health at home. Will need follow up with Dr. Tami Montalvo next week, would do CBC and protime that day. Follow up with me in one month with echo              [x]        High complexity decision making was performed            We discussed the expected course, resolution and complications of the diagnoses in detail. Medication risk, benefits, costs, interactions, and alternatives were discussed as indicated.   I advised him to contact the office if his condition worsens, changes or fails to improve as anticipated. Patient expressed understanding with the diagnoses  and plan. Subjective:     Kenia Ivory denies chest pain, dyspnea. Discussed with RN events overnight. Review of Systems:    Symptom Y/N Comments  Symptom Y/N Comments   Fever/Chills N   Chest Pain N    Poor Appetite N   Edema N    Cough N   Abdominal Pain N    Sputum N   Joint Pain N    SOB/VILA N   Pruritis/Rash N    Nausea/vomit N   Tolerating PT/OT     Diarrhea N   Tolerating Diet Y    Constipation N   Other       Could NOT obtain due to:      Objective:      Physical Exam:    Last 24hrs VS reviewed since prior progress note. Most recent are:    Visit Vitals  BP 93/74   Pulse 90   Temp 98.2 °F (36.8 °C)   Resp 18   Ht 5' 2\" (1.575 m)   Wt 64.3 kg (141 lb 12.1 oz)   SpO2 92%   Breastfeeding No   BMI 25.93 kg/m²       Intake/Output Summary (Last 24 hours) at 7/21/2022 0931  Last data filed at 7/21/2022 7195  Gross per 24 hour   Intake 1770 ml   Output 1200 ml   Net 570 ml        General Appearance: Well developed, well nourished, alert & oriented x 3,    no acute distress. Ears/Nose/Mouth/Throat: Hearing grossly normal.  Neck: Supple. Chest: Lungs clear to auscultation bilaterally. Cardiovascular: Regular rate and rhythm, S1S2 normal, no murmur. Abdomen: Soft, non-tender, bowel sounds are active. Extremities: No edema bilaterally. Left groin echymosis  Skin: Warm and dry. PMH/SH reviewed - no change compared to H&P    Data Review    Telemetry: normal sinus rhythm     Lab Data Personally Reviewed:    Recent Labs     07/21/22  0501 07/20/22  1225   WBC 6.4 6.2   HGB 7.5* 8.4*   HCT 26.9* 29.3*    252     Recent Labs     07/21/22  0501 07/20/22  0900   INR 1.2* 1.1   PTP 12.3*  --       Recent Labs     07/21/22  0501      K 3.7      CO2 28   BUN 7   CREA 0.62   *   CA 8.3*     No results for input(s): CPK, CKNDX, TROIQ in the last 72 hours.     No lab exists for component: CPKMB  Lab Results Component Value Date/Time    Cholesterol, total 107 06/25/2021 11:52 AM    HDL Cholesterol 52 06/25/2021 11:52 AM    LDL, calculated 39 06/25/2021 11:52 AM    Triglyceride 80 06/25/2021 11:52 AM    CHOL/HDL Ratio 2.1 06/25/2021 11:52 AM       Recent Labs     07/21/22  0501   AP 58   TP 5.8*   ALB 2.6*   GLOB 3.2     No results for input(s): PH, PCO2, PO2 in the last 72 hours.     Medications Personally Reviewed:    Current Facility-Administered Medications   Medication Dose Route Frequency    ferrous sulfate tablet 325 mg  1 Tablet Oral DAILY WITH BREAKFAST    metoprolol succinate (TOPROL-XL) XL tablet 25 mg  25 mg Oral DAILY    furosemide (LASIX) tablet 20 mg  20 mg Oral DAILY    potassium chloride SR (KLOR-CON 10) tablet 10 mEq  10 mEq Oral DAILY    albuterol (PROVENTIL VENTOLIN) nebulizer solution 2.5 mg  2.5 mg Nebulization Q6H PRN    amitriptyline (ELAVIL) tablet 10 mg  10 mg Oral QHS    atorvastatin (LIPITOR) tablet 20 mg  20 mg Oral QHS    benzonatate (TESSALON) capsule 100 mg  100 mg Oral TID PRN    buPROPion XL (WELLBUTRIN XL) tablet 150 mg  150 mg Oral DAILY    escitalopram oxalate (LEXAPRO) tablet 20 mg  20 mg Oral DAILY    pantoprazole (PROTONIX) tablet 40 mg  40 mg Oral ACB    ezetimibe (ZETIA) tablet 10 mg  10 mg Oral DAILY    QUEtiapine (SEROquel) tablet 25 mg  25 mg Oral QHS    traMADoL (ULTRAM) tablet 50 mg  50 mg Oral Q6H PRN    traZODone (DESYREL) tablet 100 mg  100 mg Oral QHS    sodium chloride (NS) flush 5-40 mL  5-40 mL IntraVENous Q8H    sodium chloride (NS) flush 5-40 mL  5-40 mL IntraVENous PRN    acetaminophen (TYLENOL) tablet 650 mg  650 mg Oral Q4H PRN    naloxone (NARCAN) injection 0.4 mg  0.4 mg IntraVENous PRN    ondansetron (ZOFRAN) injection 4 mg  4 mg IntraVENous Q4H PRN    magnesium oxide (MAG-OX) tablet 400 mg  400 mg Oral BID    bisacodyL (DULCOLAX) suppository 10 mg  10 mg Rectal DAILY PRN    senna-docusate (PERICOLACE) 8.6-50 mg per tablet 1 Tablet  1 Tablet Oral BID clopidogreL (PLAVIX) tablet 75 mg  75 mg Oral DAILY    levothyroxine (SYNTHROID) tablet 50 mcg  50 mcg Oral 6am    Warfarin- Pharmacy to dose   Other QPM         Priti Gandhi MD None
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
## 2024-12-03 NOTE — PATIENT PROFILE ADULT - FALL HARM RISK - RISK INTERVENTIONS
Communicate Fall Risk and Risk Factors to all staff, patient, and family/Monitor gait and stability/Review medications for side effects contributing to fall risk/Use of alarms - bed, chair and/or voice tab/Bed in lowest position, wheels locked, appropriate side rails in place/Instruct patient to call for assistance before getting out of bed or chair/[place] to call system/Purposeful Proactive Rounding
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
## 2024-12-03 NOTE — CONSULT NOTE ADULT - TIME BILLING
greater than 50% of time spent reviewing labs, notes, orders and radiographs, coordinating care  discussed with nursing, primary team,  consultants

## 2024-12-03 NOTE — PROGRESS NOTE ADULT - NS ATTEND AMEND GEN_ALL_CORE FT
nuclear stress test pharmacologic personally reported and read.   1. Normal myocardial perfusion scan, with no scan evidence of infarction or inducible ischemia.   2. The patient underwent stress testing using pharmacological IV regadenoson (bolus injection, 400mcg over 10 to 20 seconds followed by 5ml flush) protocol.      _ The total procedure time was 4 minutes . The test was stopped due to completion of protocol.      _ The peak heart rate was 116 bpm: 75 % of the patient's predicted maximal heart rate.   3. Baseline electrocardiogram: sinus rhythm at a rate of 68 bpm with nonspecific ST-T wave abnormalities, T wave inversion and TWI IN Lead III, AVR.   4. Stress electrocardiogram: No significant ischemic ST segment changes beyond baseline abnormalities.   5. Arrhythmias: No arrhythmia associated with stress.   6. Normal left ventricular regional wall motion.   7. Perfusion Findings: Stress prone imaging used in this study. There is no significant extracardiac radiotracer uptake noted other than in GI tract. There is breast attenuation artifact noted on rest, stress and stress prone imaging. There is no significant perfusion qualitative abnormality noted.   8. The left ventricle is hyperdynamic in function and small in size. The post stress left ventricular EF is 83 %. The stress end diastolic volume is 61 ml and systolic volume is 11 ml.      Patient is not a prohibitive cardiovascular risk for procedures as required with an appropriate 2d TTE and nuclear stress test done recently.  optimize cardiovascular risk factors and lifestyle, recommend Sleep apnea testing outpatient and weight loss management long term.  telemetry appropriate.    we will follow peripherally

## 2024-12-04 LAB
ALBUMIN SERPL ELPH-MCNC: 3.4 G/DL — SIGNIFICANT CHANGE UP (ref 3.3–5.2)
ALP SERPL-CCNC: 102 U/L — SIGNIFICANT CHANGE UP (ref 40–120)
ALT FLD-CCNC: 25 U/L — SIGNIFICANT CHANGE UP
ANION GAP SERPL CALC-SCNC: 11 MMOL/L — SIGNIFICANT CHANGE UP (ref 5–17)
AST SERPL-CCNC: 33 U/L — HIGH
BILIRUB SERPL-MCNC: 0.2 MG/DL — LOW (ref 0.4–2)
BLD GP AB SCN SERPL QL: SIGNIFICANT CHANGE UP
BUN SERPL-MCNC: 14.9 MG/DL — SIGNIFICANT CHANGE UP (ref 8–20)
CALCIUM SERPL-MCNC: 9.3 MG/DL — SIGNIFICANT CHANGE UP (ref 8.4–10.5)
CHLORIDE SERPL-SCNC: 102 MMOL/L — SIGNIFICANT CHANGE UP (ref 96–108)
CO2 SERPL-SCNC: 26 MMOL/L — SIGNIFICANT CHANGE UP (ref 22–29)
CREAT SERPL-MCNC: 0.51 MG/DL — SIGNIFICANT CHANGE UP (ref 0.5–1.3)
EGFR: 103 ML/MIN/1.73M2 — SIGNIFICANT CHANGE UP
GLUCOSE BLDC GLUCOMTR-MCNC: 110 MG/DL — HIGH (ref 70–99)
GLUCOSE BLDC GLUCOMTR-MCNC: 113 MG/DL — HIGH (ref 70–99)
GLUCOSE BLDC GLUCOMTR-MCNC: 118 MG/DL — HIGH (ref 70–99)
GLUCOSE SERPL-MCNC: 152 MG/DL — HIGH (ref 70–99)
HCT VFR BLD CALC: 37.8 % — SIGNIFICANT CHANGE UP (ref 34.5–45)
HGB BLD-MCNC: 12.3 G/DL — SIGNIFICANT CHANGE UP (ref 11.5–15.5)
MAGNESIUM SERPL-MCNC: 2.4 MG/DL — SIGNIFICANT CHANGE UP (ref 1.6–2.6)
MCHC RBC-ENTMCNC: 27.5 PG — SIGNIFICANT CHANGE UP (ref 27–34)
MCHC RBC-ENTMCNC: 32.5 G/DL — SIGNIFICANT CHANGE UP (ref 32–36)
MCV RBC AUTO: 84.6 FL — SIGNIFICANT CHANGE UP (ref 80–100)
PHOSPHATE SERPL-MCNC: 3.5 MG/DL — SIGNIFICANT CHANGE UP (ref 2.4–4.7)
PLATELET # BLD AUTO: 294 K/UL — SIGNIFICANT CHANGE UP (ref 150–400)
POTASSIUM SERPL-MCNC: 4.1 MMOL/L — SIGNIFICANT CHANGE UP (ref 3.5–5.3)
POTASSIUM SERPL-SCNC: 4.1 MMOL/L — SIGNIFICANT CHANGE UP (ref 3.5–5.3)
PROCALCITONIN SERPL-MCNC: 0.08 NG/ML — SIGNIFICANT CHANGE UP (ref 0.02–0.1)
PROT SERPL-MCNC: 6.6 G/DL — SIGNIFICANT CHANGE UP (ref 6.6–8.7)
RBC # BLD: 4.47 M/UL — SIGNIFICANT CHANGE UP (ref 3.8–5.2)
RBC # FLD: 13.1 % — SIGNIFICANT CHANGE UP (ref 10.3–14.5)
SODIUM SERPL-SCNC: 139 MMOL/L — SIGNIFICANT CHANGE UP (ref 135–145)
WBC # BLD: 10.78 K/UL — HIGH (ref 3.8–10.5)
WBC # FLD AUTO: 10.78 K/UL — HIGH (ref 3.8–10.5)

## 2024-12-04 PROCEDURE — 99231 SBSQ HOSP IP/OBS SF/LOW 25: CPT

## 2024-12-04 PROCEDURE — 99233 SBSQ HOSP IP/OBS HIGH 50: CPT

## 2024-12-04 RX ORDER — TIRZEPATIDE 2.5 MG/.5ML
2.5 INJECTION, SOLUTION SUBCUTANEOUS
Qty: 1 | Refills: 4
Start: 2024-12-04

## 2024-12-04 RX ADMIN — Medication 4 MILLIGRAM(S): at 05:12

## 2024-12-04 RX ADMIN — Medication 4 MILLIGRAM(S): at 18:01

## 2024-12-04 RX ADMIN — SODIUM CHLORIDE 3 MILLILITER(S): 9 INJECTION, SOLUTION INTRAMUSCULAR; INTRAVENOUS; SUBCUTANEOUS at 09:00

## 2024-12-04 RX ADMIN — ESCITALOPRAM OXALATE 10 MILLIGRAM(S): 10 TABLET, FILM COATED ORAL at 08:23

## 2024-12-04 RX ADMIN — Medication 1000 MILLIGRAM(S): at 08:23

## 2024-12-04 RX ADMIN — Medication 10 MILLIGRAM(S): at 08:23

## 2024-12-04 RX ADMIN — AZITHROMYCIN 255 MILLIGRAM(S): 250 TABLET, FILM COATED ORAL at 01:37

## 2024-12-04 RX ADMIN — SODIUM CHLORIDE 3 MILLILITER(S): 9 INJECTION, SOLUTION INTRAMUSCULAR; INTRAVENOUS; SUBCUTANEOUS at 05:10

## 2024-12-04 RX ADMIN — AMLODIPINE BESYLATE 10 MILLIGRAM(S): 10 TABLET ORAL at 05:12

## 2024-12-04 RX ADMIN — SODIUM CHLORIDE 3 MILLILITER(S): 9 INJECTION, SOLUTION INTRAMUSCULAR; INTRAVENOUS; SUBCUTANEOUS at 21:19

## 2024-12-04 NOTE — PROGRESS NOTE ADULT - PROBLEM SELECTOR PLAN 1
- now s/p NST showing no evidence of ischemia/infarct   - Echo shows EF normal 71%, normal RV, normal pulmonary artery   - CTA negative for PE, LE doppler negative for DVT  - RCRI Estimated Risk of Adverse Outcome with Non-cardiac Surgery is Low Risk  - Estimated Rate of Myocardial Infarction, Pulmonary Edema, Ventricular Fibrillation, Cardiac Arrest, or Complete Heart Block 0.4 %  - there are no absolute cardiac contraindications to bronchoscopy/endobronchial procedures
Known lingular nodule. Followed by Dr. Pa as outpatient  Now with PNA, CT chest revealed consolidation at lingula.  Planned for FB with Dr. Pa tomorrow  NPO p MN  Continue antibiotics for treatment of PNA  D/w Dr. Pa.
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
## 2024-12-05 ENCOUNTER — TRANSCRIPTION ENCOUNTER (OUTPATIENT)
Age: 64
End: 2024-12-05

## 2024-12-05 ENCOUNTER — APPOINTMENT (OUTPATIENT)
Dept: THORACIC SURGERY | Facility: HOSPITAL | Age: 64
End: 2024-12-05
Payer: MEDICARE

## 2024-12-05 LAB
ABO RH CONFIRMATION: SIGNIFICANT CHANGE UP
ALBUMIN SERPL ELPH-MCNC: 3.3 G/DL — SIGNIFICANT CHANGE UP (ref 3.3–5.2)
ALP SERPL-CCNC: 102 U/L — SIGNIFICANT CHANGE UP (ref 40–120)
ALT FLD-CCNC: 38 U/L — HIGH
ANION GAP SERPL CALC-SCNC: 12 MMOL/L — SIGNIFICANT CHANGE UP (ref 5–17)
AST SERPL-CCNC: 60 U/L — HIGH
B PERT IGG+IGM PNL SER: ABNORMAL
BASOPHILS # BLD AUTO: 0.07 K/UL — SIGNIFICANT CHANGE UP (ref 0–0.2)
BASOPHILS NFR BLD AUTO: 0.7 % — SIGNIFICANT CHANGE UP (ref 0–2)
BILIRUB SERPL-MCNC: 0.3 MG/DL — LOW (ref 0.4–2)
BLD GP AB SCN SERPL QL: SIGNIFICANT CHANGE UP
BUN SERPL-MCNC: 18.2 MG/DL — SIGNIFICANT CHANGE UP (ref 8–20)
CALCIUM SERPL-MCNC: 8.5 MG/DL — SIGNIFICANT CHANGE UP (ref 8.4–10.5)
CHLORIDE SERPL-SCNC: 103 MMOL/L — SIGNIFICANT CHANGE UP (ref 96–108)
CO2 SERPL-SCNC: 25 MMOL/L — SIGNIFICANT CHANGE UP (ref 22–29)
COLOR FLD: ABNORMAL
CREAT SERPL-MCNC: 0.56 MG/DL — SIGNIFICANT CHANGE UP (ref 0.5–1.3)
EGFR: 101 ML/MIN/1.73M2 — SIGNIFICANT CHANGE UP
EOSINOPHIL # BLD AUTO: 0.18 K/UL — SIGNIFICANT CHANGE UP (ref 0–0.5)
EOSINOPHIL # FLD: 1 % — SIGNIFICANT CHANGE UP
EOSINOPHIL NFR BLD AUTO: 1.8 % — SIGNIFICANT CHANGE UP (ref 0–6)
FLUID INTAKE SUBSTANCE CLASS: SIGNIFICANT CHANGE UP
GLUCOSE BLDC GLUCOMTR-MCNC: 104 MG/DL — HIGH (ref 70–99)
GLUCOSE BLDC GLUCOMTR-MCNC: 120 MG/DL — HIGH (ref 70–99)
GLUCOSE BLDC GLUCOMTR-MCNC: 130 MG/DL — HIGH (ref 70–99)
GLUCOSE BLDC GLUCOMTR-MCNC: 93 MG/DL — SIGNIFICANT CHANGE UP (ref 70–99)
GLUCOSE SERPL-MCNC: 120 MG/DL — HIGH (ref 70–99)
HCT VFR BLD CALC: 38.4 % — SIGNIFICANT CHANGE UP (ref 34.5–45)
HGB BLD-MCNC: 12.6 G/DL — SIGNIFICANT CHANGE UP (ref 11.5–15.5)
IMM GRANULOCYTES NFR BLD AUTO: 0.5 % — SIGNIFICANT CHANGE UP (ref 0–0.9)
INR BLD: 1.01 RATIO — SIGNIFICANT CHANGE UP (ref 0.85–1.16)
LYMPHOCYTES # BLD AUTO: 3.67 K/UL — HIGH (ref 1–3.3)
LYMPHOCYTES # BLD AUTO: 37.6 % — SIGNIFICANT CHANGE UP (ref 13–44)
LYMPHOCYTES # FLD: 36 % — SIGNIFICANT CHANGE UP
MCHC RBC-ENTMCNC: 28.1 PG — SIGNIFICANT CHANGE UP (ref 27–34)
MCHC RBC-ENTMCNC: 32.8 G/DL — SIGNIFICANT CHANGE UP (ref 32–36)
MCV RBC AUTO: 85.5 FL — SIGNIFICANT CHANGE UP (ref 80–100)
MESOTHL CELL # FLD: 9 % — SIGNIFICANT CHANGE UP
MONOCYTES # BLD AUTO: 0.45 K/UL — SIGNIFICANT CHANGE UP (ref 0–0.9)
MONOCYTES NFR BLD AUTO: 4.6 % — SIGNIFICANT CHANGE UP (ref 2–14)
MONOS+MACROS # FLD: 5 % — SIGNIFICANT CHANGE UP
NEUTROPHILS # BLD AUTO: 5.34 K/UL — SIGNIFICANT CHANGE UP (ref 1.8–7.4)
NEUTROPHILS NFR BLD AUTO: 54.8 % — SIGNIFICANT CHANGE UP (ref 43–77)
NEUTROPHILS-BODY FLUID: 48 % — SIGNIFICANT CHANGE UP
OTHER CELLS FLD MANUAL: SIGNIFICANT CHANGE UP %
PLATELET # BLD AUTO: 298 K/UL — SIGNIFICANT CHANGE UP (ref 150–400)
POTASSIUM SERPL-MCNC: 4 MMOL/L — SIGNIFICANT CHANGE UP (ref 3.5–5.3)
POTASSIUM SERPL-SCNC: 4 MMOL/L — SIGNIFICANT CHANGE UP (ref 3.5–5.3)
PROT SERPL-MCNC: 6.5 G/DL — LOW (ref 6.6–8.7)
PROTHROM AB SERPL-ACNC: 11.7 SEC — SIGNIFICANT CHANGE UP (ref 9.9–13.4)
RBC # BLD: 4.49 M/UL — SIGNIFICANT CHANGE UP (ref 3.8–5.2)
RBC # FLD: 13.3 % — SIGNIFICANT CHANGE UP (ref 10.3–14.5)
RCV VOL RI: HIGH /UL (ref 0–0)
SODIUM SERPL-SCNC: 140 MMOL/L — SIGNIFICANT CHANGE UP (ref 135–145)
SPECIMEN SOURCE FLD: SIGNIFICANT CHANGE UP
TOTAL NUCLEATED CELL COUNT, BODY FLUID: 333 /UL — SIGNIFICANT CHANGE UP
TUBE TYPE: SIGNIFICANT CHANGE UP
WBC # BLD: 9.76 K/UL — SIGNIFICANT CHANGE UP (ref 3.8–10.5)
WBC # FLD AUTO: 9.76 K/UL — SIGNIFICANT CHANGE UP (ref 3.8–10.5)

## 2024-12-05 PROCEDURE — 88112 CYTOPATH CELL ENHANCE TECH: CPT | Mod: 26

## 2024-12-05 PROCEDURE — 71045 X-RAY EXAM CHEST 1 VIEW: CPT | Mod: 26

## 2024-12-05 PROCEDURE — 88312 SPECIAL STAINS GROUP 1: CPT | Mod: 26

## 2024-12-05 PROCEDURE — 31624 DX BRONCHOSCOPE/LAVAGE: CPT

## 2024-12-05 PROCEDURE — 88305 TISSUE EXAM BY PATHOLOGIST: CPT | Mod: 26

## 2024-12-05 PROCEDURE — ZZZZZ: CPT

## 2024-12-05 PROCEDURE — 99232 SBSQ HOSP IP/OBS MODERATE 35: CPT

## 2024-12-05 PROCEDURE — 31623 DX BRONCHOSCOPE/BRUSH: CPT

## 2024-12-05 RX ORDER — ACETAMINOPHEN 500MG 500 MG/1
2 TABLET, COATED ORAL
Qty: 0 | Refills: 0 | DISCHARGE
Start: 2024-12-05

## 2024-12-05 RX ADMIN — SODIUM CHLORIDE 3 MILLILITER(S): 9 INJECTION, SOLUTION INTRAMUSCULAR; INTRAVENOUS; SUBCUTANEOUS at 06:52

## 2024-12-05 RX ADMIN — SODIUM CHLORIDE 3 MILLILITER(S): 9 INJECTION, SOLUTION INTRAMUSCULAR; INTRAVENOUS; SUBCUTANEOUS at 21:15

## 2024-12-05 RX ADMIN — ACETAMINOPHEN 500MG 650 MILLIGRAM(S): 500 TABLET, COATED ORAL at 16:41

## 2024-12-05 RX ADMIN — Medication 1000 MILLIGRAM(S): at 08:31

## 2024-12-05 RX ADMIN — AZITHROMYCIN 255 MILLIGRAM(S): 250 TABLET, FILM COATED ORAL at 02:57

## 2024-12-05 RX ADMIN — ACETAMINOPHEN 500MG 650 MILLIGRAM(S): 500 TABLET, COATED ORAL at 15:53

## 2024-12-05 RX ADMIN — SODIUM CHLORIDE 3 MILLILITER(S): 9 INJECTION, SOLUTION INTRAMUSCULAR; INTRAVENOUS; SUBCUTANEOUS at 11:29

## 2024-12-05 RX ADMIN — ONDANSETRON HYDROCHLORIDE 4 MILLIGRAM(S): 4 TABLET, FILM COATED ORAL at 21:15

## 2024-12-05 RX ADMIN — Medication 4 MILLIGRAM(S): at 17:07

## 2024-12-05 NOTE — DIETITIAN INITIAL EVALUATION ADULT - ENTER FROM (CAL/KG)
UROLOGY PROGRESS NOTE   Patient Identifiers: Laron Trimble (MRN [id number])  Date of Service: 11/23/2022    Subjective:    64-year-old man with history of urinary retention due to BPH  He would a TURP on November 10th  Pathology is pending  Very little hematuria remaining  PVR 75 mL  Reason for visit:  BPH/TURP follow-up    Objective:     VITALS:    There were no vitals filed for this visit  LABS:  Lab Results   Component Value Date    HGB 13 0 10/17/2022    HCT 39 6 10/17/2022    WBC 6 16 10/17/2022     10/17/2022   ]    Lab Results   Component Value Date    K 3 8 10/17/2022     10/17/2022    CO2 25 10/17/2022    BUN 12 10/17/2022    CREATININE 0 88 10/17/2022    CALCIUM 9 0 10/17/2022   ]        INPATIENT MEDS:    Current Outpatient Medications:   •  Acetaminophen (Tylenol) 325 MG CAPS, Take by mouth, Disp: , Rfl:   •  amLODIPine (NORVASC) 10 mg tablet, Take 1 tablet (10 mg total) by mouth daily, Disp: 90 tablet, Rfl: 1  •  Ascorbic Acid (vitamin C) 1000 MG tablet, Take 1,000 mg by mouth daily, Disp: , Rfl:   •  Multiple Vitamin (MULTIVITAMINS PO), Take by mouth Daily, Disp: , Rfl:   •  diclofenac potassium (CATAFLAM) 50 mg tablet, Take 1 tablet (50 mg total) by mouth 2 (two) times a day for 5 days, Disp: 10 tablet, Rfl: 0  •  senna (SENOKOT) 8 6 mg, Take 1 tablet (8 6 mg total) by mouth daily at bedtime for 5 days, Disp: 5 tablet, Rfl: 0      Physical Exam:   Ht 5' 9" (1 753 m)   Wt 97 5 kg (215 lb)   BMI 31 75 kg/m²   GEN: no acute distress    RESP: breathing comfortably with no accessory muscle use    ABD: soft, non-tender, non-distended   INCISION:    EXT: no significant peripheral edema     PATHOLOGY:    Pending     Assessment:   1  BPH/TURP   2   Hydronephrosis    Plan:   -follow-up in 3 months with kidney and bladder ultrasound prior to visit  -will review pathology at that time  -  - 25
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
## 2024-12-05 NOTE — DIETITIAN INITIAL EVALUATION ADULT - PERTINENT LABORATORY DATA
12-05    140  |  103  |  18.2  ----------------------------<  120[H]  4.0   |  25.0  |  0.56    Ca    8.5      05 Dec 2024 04:51  Phos  3.5     12-04  Mg     2.4     12-04    TPro  6.5[L]  /  Alb  3.3  /  TBili  0.3[L]  /  DBili  x   /  AST  60[H]  /  ALT  38[H]  /  AlkPhos  102  12-05  POCT Blood Glucose.: 104 mg/dL (12-05-24 @ 06:51)  A1C with Estimated Average Glucose Result: 5.9 % (11-30-24 @ 14:28)

## 2024-12-05 NOTE — DISCHARGE NOTE PROVIDER - CARE PROVIDER_API CALL
Isaiah Pa  Thoracic Surgery  [address] [place] [id number]  Phone: [phone]  Fax: [phone]  Follow Up Time: 2 weeks   Isaiah Pa  Thoracic Surgery  [address] [place] [id number]  Phone: [phone]  Fax: [phone]  Follow Up Time: 2 weeks    Blake Nicole  Pulmonary Disease  [address] [facility], [address]  [place] [address]  Phone: [phone]  Fax: [phone]  Follow Up Time: 2 weeks

## 2024-12-05 NOTE — DIETITIAN INITIAL EVALUATION ADULT - PERTINENT MEDS FT
MEDICATIONS  (STANDING):  amLODIPine   Tablet 10 milliGRAM(s) Oral daily  azithromycin  IVPB 500 milliGRAM(s) IV Intermittent every 24 hours  cefTRIAXone Injectable. 1000 milliGRAM(s) IV Push every 24 hours  dextrose 5%. 1000 milliLiter(s) (50 mL/Hr) IV Continuous <Continuous>  dextrose 50% Injectable 25 Gram(s) IV Push once  escitalopram 10 milliGRAM(s) Oral daily  glucagon  Injectable 1 milliGRAM(s) IntraMuscular once  insulin lispro (ADMELOG) corrective regimen sliding scale   SubCutaneous every 6 hours  oxybutynin XL 10 milliGRAM(s) Oral daily  perphenazine 4 milliGRAM(s) Oral every 12 hours  sodium chloride 0.9% lock flush 3 milliLiter(s) IV Push every 8 hours    MEDICATIONS  (PRN):  acetaminophen     Tablet .. 650 milliGRAM(s) Oral every 6 hours PRN Temp greater or equal to 38C (100.4F), Mild Pain (1 - 3)  albuterol/ipratropium for Nebulization 3 milliLiter(s) Nebulizer every 6 hours PRN Shortness of Breath and/or Wheezing  aluminum hydroxide/magnesium hydroxide/simethicone Suspension 30 milliLiter(s) Oral every 4 hours PRN Dyspepsia  dextrose Oral Gel 15 Gram(s) Oral once PRN Blood Glucose LESS THAN 70 milliGRAM(s)/deciliter  melatonin 3 milliGRAM(s) Oral at bedtime PRN Insomnia  ondansetron Injectable 4 milliGRAM(s) IV Push every 8 hours PRN Nausea and/or Vomiting

## 2024-12-05 NOTE — DISCHARGE NOTE PROVIDER - NSDCCPCAREPLAN_GEN_ALL_CORE_FT
PRINCIPAL DISCHARGE DIAGNOSIS  Diagnosis: Pleuritic chest pain  Assessment and Plan of Treatment:       SECONDARY DISCHARGE DIAGNOSES  Diagnosis: Lung nodule  Assessment and Plan of Treatment:      PRINCIPAL DISCHARGE DIAGNOSIS  Diagnosis: Pleuritic chest pain  Assessment and Plan of Treatment: Suspsected secondary to pneumonia  - PO vantin as prescribed on discharge      SECONDARY DISCHARGE DIAGNOSES  Diagnosis: Lung nodule  Assessment and Plan of Treatment: Status post bronchoscopy  Follow up with dr Pa in 1- 2weeks

## 2024-12-05 NOTE — DIETITIAN INITIAL EVALUATION ADULT - NS FNS DIET ORDER
Diet, NPO after Midnight:      NPO Start Date: 04-Dec-2024,   NPO Start Time: 23:59 (12-04-24 @ 07:35)  Diet, DASH/TLC:   Sodium & Cholesterol Restricted  Consistent Carbohydrate {No Snacks} (CSTCHO) (12-03-24 @ 11:46)

## 2024-12-05 NOTE — DIETITIAN INITIAL EVALUATION ADULT - OTHER INFO
63 y/o female with CAP, left sided pleuritic CP, Lingular nodule, HTN, Glucose intolerance, Depression

## 2024-12-05 NOTE — DISCHARGE NOTE PROVIDER - CARE PROVIDERS DIRECT ADDRESSES
,[email or web address]

## 2024-12-05 NOTE — DISCHARGE NOTE PROVIDER - PROVIDER TOKENS
PROVIDER:[TOKEN:[[id number]],FOLLOWUP:[2 weeks]] PROVIDER:[TOKEN:[[id number]],FOLLOWUP:[2 weeks]],PROVIDER:[TOKEN:[[id number]],FOLLOWUP:[2 weeks]]
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
## 2024-12-05 NOTE — DIETITIAN INITIAL EVALUATION ADULT - ORAL INTAKE PTA/DIET HISTORY
# [id number]   Pt endorses tolerating meals with good PO intake. Endorses 60-70lb weight gain over the last few years because of stress related eating. A1c 5.9 discussed. Diet education on plate model explained and provided. Pt receptive, all questions answered.

## 2024-12-05 NOTE — DISCHARGE NOTE PROVIDER - NSDCMRMEDTOKEN_GEN_ALL_CORE_FT
acetaminophen 325 mg oral tablet: 2 tab(s) orally every 6 hours As needed Temp greater or equal to 38C (100.4F), Mild Pain (1 - 3)  Azithromycin 5 Day Dose Pack 250 mg oral tablet: 1 dose(s) orally once a day take as directed on package insert  cefdinir 300 mg oral capsule: 1 cap(s) orally 2 times a day  Lexapro 10 mg oral tablet: 1 tab(s) orally once a day  metFORMIN 750 mg oral tablet, extended release: 1 tab(s) orally once a day  Mounjaro 2.5 mg/0.5 mL subcutaneous solution: 2.5 milligram(s) subcutaneously once a week Take 1 injection weekly for 4 weeks  Mounjaro 2.5 mg/0.5 mL subcutaneous solution: 2.5 milligram(s) subcutaneously once a week take once weekly for 4 weeks  Norvasc 10 mg oral tablet: 1 tab(s) orally once a day  perphenazine 4 mg oral tablet: 1 tab(s) orally 2 times a day  solifenacin 10 mg oral tablet: 1 tab(s) orally 2 times a day   acetaminophen 325 mg oral tablet: 2 tab(s) orally every 6 hours As needed Temp greater or equal to 38C (100.4F), Mild Pain (1 - 3)  cefpodoxime 200 mg oral tablet: 1 tab(s) orally every 12 hours  Lexapro 10 mg oral tablet: 1 tab(s) orally once a day  metFORMIN 750 mg oral tablet, extended release: 1 tab(s) orally once a day  Mounjaro 2.5 mg/0.5 mL subcutaneous solution: 2.5 milligram(s) subcutaneously once a week take once weekly for 4 weeks  Norvasc 10 mg oral tablet: 1 tab(s) orally once a day  perphenazine 4 mg oral tablet: 1 tab(s) orally 2 times a day  solifenacin 10 mg oral tablet: 1 tab(s) orally 2 times a day   acetaminophen 325 mg oral tablet: 2 tab(s) orally every 6 hours As needed Temp greater or equal to 38C (100.4F), Mild Pain (1 - 3)  cefpodoxime 200 mg oral tablet: 1 tab(s) orally every 12 hours  Lexapro 10 mg oral tablet: 1 tab(s) orally once a day  metFORMIN 750 mg oral tablet, extended release: 1 tab(s) orally once a day  Mounjaro 2.5 mg/0.5 mL subcutaneous solution: 2.5 milligram(s) subcutaneously once a week Take 1 injection weekly for 4 weeks  Mounjaro 2.5 mg/0.5 mL subcutaneous solution: 2.5 milligram(s) subcutaneously once a week Take 1 injection weekly for 4 weeks  Norvasc 10 mg oral tablet: 1 tab(s) orally once a day  perphenazine 4 mg oral tablet: 1 tab(s) orally 2 times a day  solifenacin 10 mg oral tablet: 1 tab(s) orally 2 times a day

## 2024-12-06 ENCOUNTER — TRANSCRIPTION ENCOUNTER (OUTPATIENT)
Age: 64
End: 2024-12-06

## 2024-12-06 VITALS
OXYGEN SATURATION: 98 % | DIASTOLIC BLOOD PRESSURE: 63 MMHG | RESPIRATION RATE: 18 BRPM | TEMPERATURE: 98 F | SYSTOLIC BLOOD PRESSURE: 152 MMHG | HEART RATE: 86 BPM

## 2024-12-06 LAB
ALBUMIN SERPL ELPH-MCNC: 3.7 G/DL — SIGNIFICANT CHANGE UP (ref 3.3–5.2)
ALP SERPL-CCNC: 123 U/L — HIGH (ref 40–120)
ALT FLD-CCNC: 87 U/L — HIGH
ANION GAP SERPL CALC-SCNC: 14 MMOL/L — SIGNIFICANT CHANGE UP (ref 5–17)
AST SERPL-CCNC: 144 U/L — HIGH
BILIRUB SERPL-MCNC: 0.4 MG/DL — SIGNIFICANT CHANGE UP (ref 0.4–2)
BUN SERPL-MCNC: 13.3 MG/DL — SIGNIFICANT CHANGE UP (ref 8–20)
CALCIUM SERPL-MCNC: 8.8 MG/DL — SIGNIFICANT CHANGE UP (ref 8.4–10.5)
CHLORIDE SERPL-SCNC: 101 MMOL/L — SIGNIFICANT CHANGE UP (ref 96–108)
CO2 SERPL-SCNC: 23 MMOL/L — SIGNIFICANT CHANGE UP (ref 22–29)
CREAT SERPL-MCNC: 0.64 MG/DL — SIGNIFICANT CHANGE UP (ref 0.5–1.3)
EGFR: 97 ML/MIN/1.73M2 — SIGNIFICANT CHANGE UP
GLUCOSE BLDC GLUCOMTR-MCNC: 125 MG/DL — HIGH (ref 70–99)
GLUCOSE BLDC GLUCOMTR-MCNC: 160 MG/DL — HIGH (ref 70–99)
GLUCOSE BLDC GLUCOMTR-MCNC: 160 MG/DL — HIGH (ref 70–99)
GLUCOSE BLDC GLUCOMTR-MCNC: 86 MG/DL — SIGNIFICANT CHANGE UP (ref 70–99)
GLUCOSE SERPL-MCNC: 157 MG/DL — HIGH (ref 70–99)
GRAM STN FLD: SIGNIFICANT CHANGE UP
LIDOCAIN IGE QN: 25 U/L — SIGNIFICANT CHANGE UP (ref 22–51)
NIGHT BLUE STAIN TISS: SIGNIFICANT CHANGE UP
POTASSIUM SERPL-MCNC: 3.7 MMOL/L — SIGNIFICANT CHANGE UP (ref 3.5–5.3)
POTASSIUM SERPL-SCNC: 3.7 MMOL/L — SIGNIFICANT CHANGE UP (ref 3.5–5.3)
PROT SERPL-MCNC: 7 G/DL — SIGNIFICANT CHANGE UP (ref 6.6–8.7)
SODIUM SERPL-SCNC: 138 MMOL/L — SIGNIFICANT CHANGE UP (ref 135–145)
SPECIMEN SOURCE: SIGNIFICANT CHANGE UP
SPECIMEN SOURCE: SIGNIFICANT CHANGE UP

## 2024-12-06 PROCEDURE — 99239 HOSP IP/OBS DSCHRG MGMT >30: CPT

## 2024-12-06 RX ORDER — CEFPODOXIME PROXETIL 100 MG/5ML
200 GRANULE, FOR SUSPENSION ORAL EVERY 12 HOURS
Refills: 0 | Status: DISCONTINUED | OUTPATIENT
Start: 2024-12-06 | End: 2024-12-06

## 2024-12-06 RX ORDER — TIRZEPATIDE 2.5 MG/.5ML
2.5 INJECTION, SOLUTION SUBCUTANEOUS
Qty: 1 | Refills: 4
Start: 2024-12-06

## 2024-12-06 RX ORDER — CEFPODOXIME PROXETIL 100 MG/5ML
1 GRANULE, FOR SUSPENSION ORAL
Qty: 12 | Refills: 0
Start: 2024-12-06 | End: 2024-12-11

## 2024-12-06 RX ADMIN — Medication 4 MILLIGRAM(S): at 05:11

## 2024-12-06 RX ADMIN — SODIUM CHLORIDE 3 MILLILITER(S): 9 INJECTION, SOLUTION INTRAMUSCULAR; INTRAVENOUS; SUBCUTANEOUS at 13:37

## 2024-12-06 RX ADMIN — CEFPODOXIME PROXETIL 200 MILLIGRAM(S): 100 GRANULE, FOR SUSPENSION ORAL at 17:03

## 2024-12-06 RX ADMIN — AZITHROMYCIN 255 MILLIGRAM(S): 250 TABLET, FILM COATED ORAL at 02:46

## 2024-12-06 RX ADMIN — Medication 4 MILLIGRAM(S): at 17:03

## 2024-12-06 RX ADMIN — AMLODIPINE BESYLATE 10 MILLIGRAM(S): 10 TABLET ORAL at 05:11

## 2024-12-06 RX ADMIN — Medication 2: at 11:53

## 2024-12-06 RX ADMIN — ESCITALOPRAM OXALATE 10 MILLIGRAM(S): 10 TABLET, FILM COATED ORAL at 11:54

## 2024-12-06 RX ADMIN — SODIUM CHLORIDE 3 MILLILITER(S): 9 INJECTION, SOLUTION INTRAMUSCULAR; INTRAVENOUS; SUBCUTANEOUS at 05:11

## 2024-12-06 RX ADMIN — Medication 2: at 05:11

## 2024-12-06 RX ADMIN — Medication 10 MILLIGRAM(S): at 11:55

## 2024-12-06 NOTE — PROGRESS NOTE ADULT - SUBJECTIVE AND OBJECTIVE BOX
CC: Follow up     INTERVAL HPI/OVERNIGHT EVENTS: Patient seen and examined with  at bedside. Chest pain improving. Afebrile. Non productive cough.       Vital Signs Last 24 Hrs  T(C): 36.6 (04 Dec 2024 10:33), Max: 37 (03 Dec 2024 11:28)  T(F): 97.8 (04 Dec 2024 10:33), Max: 98.6 (03 Dec 2024 11:28)  HR: 58 (04 Dec 2024 10:33) (58 - 67)  BP: 108/68 (04 Dec 2024 10:33) (108/68 - 151/80)  BP(mean): --  RR: 18 (04 Dec 2024 10:33) (18 - 18)  SpO2: 97% (04 Dec 2024 10:33) (94% - 97%)    Parameters below as of 04 Dec 2024 10:33  Patient On (Oxygen Delivery Method): room air        PHYSICAL EXAM:    GENERAL: NAD, AOX3  HEAD:  Atraumatic, Normocephalic  EYES: conjunctiva and sclera clear  ENMT: Moist mucous membranes  CHEST/LUNG: Clear to auscultation bilaterally; No rales, rhonchi, wheezing, or rubs  HEART: Regular rate and rhythm; No murmurs, rubs, or gallops  ABDOMEN: Soft, Nontender, Nondistended; Bowel sounds present  EXTREMITIES:  2+ Peripheral Pulses, No clubbing, cyanosis, or edema        MEDICATIONS  (STANDING):  amLODIPine   Tablet 10 milliGRAM(s) Oral daily  azithromycin  IVPB 500 milliGRAM(s) IV Intermittent every 24 hours  cefTRIAXone Injectable. 1000 milliGRAM(s) IV Push every 24 hours  dextrose 5%. 1000 milliLiter(s) (50 mL/Hr) IV Continuous <Continuous>  dextrose 50% Injectable 25 Gram(s) IV Push once  escitalopram 10 milliGRAM(s) Oral daily  glucagon  Injectable 1 milliGRAM(s) IntraMuscular once  insulin lispro (ADMELOG) corrective regimen sliding scale   SubCutaneous every 6 hours  oxybutynin XL 10 milliGRAM(s) Oral daily  perphenazine 4 milliGRAM(s) Oral every 12 hours  sodium chloride 0.9% lock flush 3 milliLiter(s) IV Push every 8 hours    MEDICATIONS  (PRN):  acetaminophen     Tablet .. 650 milliGRAM(s) Oral every 6 hours PRN Temp greater or equal to 38C (100.4F), Mild Pain (1 - 3)  albuterol/ipratropium for Nebulization 3 milliLiter(s) Nebulizer every 6 hours PRN Shortness of Breath and/or Wheezing  aluminum hydroxide/magnesium hydroxide/simethicone Suspension 30 milliLiter(s) Oral every 4 hours PRN Dyspepsia  dextrose Oral Gel 15 Gram(s) Oral once PRN Blood Glucose LESS THAN 70 milliGRAM(s)/deciliter  melatonin 3 milliGRAM(s) Oral at bedtime PRN Insomnia  ondansetron Injectable 4 milliGRAM(s) IV Push every 8 hours PRN Nausea and/or Vomiting      Allergies    No Known Allergies    Intolerances          LABS:                          12.3   10.78 )-----------( 294      ( 04 Dec 2024 04:43 )             37.8     12-04    139  |  102  |  14.9  ----------------------------<  152[H]  4.1   |  26.0  |  0.51    Ca    9.3      04 Dec 2024 04:43  Phos  3.5     12-04  Mg     2.4     12-04    TPro  6.6  /  Alb  3.4  /  TBili  0.2[L]  /  DBili  x   /  AST  33[H]  /  ALT  25  /  AlkPhos  102  12-04    PT/INR - ( 02 Dec 2024 14:24 )   PT: 12.4 sec;   INR: 1.10 ratio         PTT - ( 02 Dec 2024 14:24 )  PTT:36.2 sec  Urinalysis Basic - ( 04 Dec 2024 04:43 )    Color: x / Appearance: x / SG: x / pH: x  Gluc: 152 mg/dL / Ketone: x  / Bili: x / Urobili: x   Blood: x / Protein: x / Nitrite: x   Leuk Esterase: x / RBC: x / WBC x   Sq Epi: x / Non Sq Epi: x / Bacteria: x        RADIOLOGY & ADDITIONAL TESTS:  
CC: Follow up     INTERVAL HPI/OVERNIGHT EVENTS: Patient seen and examined with . Has complaints of loose stool with nausea overnight. Afebrile, denies abdominal pain.       Vital Signs Last 24 Hrs  T(C): 36.4 (06 Dec 2024 08:28), Max: 36.7 (05 Dec 2024 15:55)  T(F): 97.6 (06 Dec 2024 08:28), Max: 98.1 (05 Dec 2024 15:55)  HR: 95 (06 Dec 2024 08:28) (73 - 95)  BP: 119/77 (06 Dec 2024 08:28) (119/77 - 138/71)  BP(mean): --  RR: 18 (06 Dec 2024 08:28) (18 - 18)  SpO2: 92% (06 Dec 2024 08:28) (92% - 95%)    Parameters below as of 06 Dec 2024 08:28  Patient On (Oxygen Delivery Method): room air        PHYSICAL EXAM:    GENERAL: NAD, AOX3  HEAD:  Atraumatic, Normocephalic  EYES: conjunctiva and sclera clear  ENMT: Moist mucous membranes  CHEST/LUNG: Clear to auscultation bilaterally; No rales, rhonchi, wheezing, or rubs  HEART: Regular rate and rhythm; No murmurs, rubs, or gallops  ABDOMEN: Soft, Nontender, Nondistended; Bowel sounds present  EXTREMITIES:  2+ Peripheral Pulses, No clubbing, cyanosis, or edema        MEDICATIONS  (STANDING):  amLODIPine   Tablet 10 milliGRAM(s) Oral daily  cefpodoxime 200 milliGRAM(s) Oral every 12 hours  dextrose 5%. 1000 milliLiter(s) (50 mL/Hr) IV Continuous <Continuous>  dextrose 50% Injectable 25 Gram(s) IV Push once  escitalopram 10 milliGRAM(s) Oral daily  glucagon  Injectable 1 milliGRAM(s) IntraMuscular once  insulin lispro (ADMELOG) corrective regimen sliding scale   SubCutaneous every 6 hours  oxybutynin XL 10 milliGRAM(s) Oral daily  perphenazine 4 milliGRAM(s) Oral every 12 hours  sodium chloride 0.9% lock flush 3 milliLiter(s) IV Push every 8 hours    MEDICATIONS  (PRN):  acetaminophen     Tablet .. 650 milliGRAM(s) Oral every 6 hours PRN Temp greater or equal to 38C (100.4F), Mild Pain (1 - 3)  albuterol/ipratropium for Nebulization 3 milliLiter(s) Nebulizer every 6 hours PRN Shortness of Breath and/or Wheezing  aluminum hydroxide/magnesium hydroxide/simethicone Suspension 30 milliLiter(s) Oral every 4 hours PRN Dyspepsia  dextrose Oral Gel 15 Gram(s) Oral once PRN Blood Glucose LESS THAN 70 milliGRAM(s)/deciliter  melatonin 3 milliGRAM(s) Oral at bedtime PRN Insomnia  ondansetron Injectable 4 milliGRAM(s) IV Push every 8 hours PRN Nausea and/or Vomiting      Allergies    No Known Allergies    Intolerances          LABS:                          12.6   9.76  )-----------( 298      ( 05 Dec 2024 04:51 )             38.4     12-05    140  |  103  |  18.2  ----------------------------<  120[H]  4.0   |  25.0  |  0.56    Ca    8.5      05 Dec 2024 04:51    TPro  6.5[L]  /  Alb  3.3  /  TBili  0.3[L]  /  DBili  x   /  AST  60[H]  /  ALT  38[H]  /  AlkPhos  102  12-05    PT/INR - ( 05 Dec 2024 04:51 )   PT: 11.7 sec;   INR: 1.01 ratio           Urinalysis Basic - ( 05 Dec 2024 04:51 )    Color: x / Appearance: x / SG: x / pH: x  Gluc: 120 mg/dL / Ketone: x  / Bili: x / Urobili: x   Blood: x / Protein: x / Nitrite: x   Leuk Esterase: x / RBC: x / WBC x   Sq Epi: x / Non Sq Epi: x / Bacteria: x        RADIOLOGY & ADDITIONAL TESTS:  
CCL Follow up     INTERVAL HPI/OVERNIGHT EVENTS: Patient seen and examined, no acute complaints overnight.       Vital Signs Last 24 Hrs  T(C): 36.4 (05 Dec 2024 10:22), Max: 36.7 (04 Dec 2024 16:21)  T(F): 97.6 (05 Dec 2024 10:22), Max: 98.1 (04 Dec 2024 16:21)  HR: 61 (05 Dec 2024 10:22) (61 - 68)  BP: 125/63 (05 Dec 2024 10:22) (117/64 - 127/74)  BP(mean): --  RR: 18 (05 Dec 2024 10:22) (18 - 18)  SpO2: 95% (05 Dec 2024 10:22) (95% - 97%)    Parameters below as of 05 Dec 2024 10:22  Patient On (Oxygen Delivery Method): room air        PHYSICAL EXAM:    GENERAL: NAD, AOX3  HEAD:  Atraumatic, Normocephalic  EYES:  conjunctiva and sclera clear  CHEST/LUNG: Clear to auscultation bilaterally; No rales, rhonchi, wheezing, or rubs  HEART: Regular rate and rhythm; No murmurs, rubs, or gallops  ABDOMEN: Soft, Nontender, Nondistended; Bowel sounds present  EXTREMITIES:  2+ Peripheral Pulses, No clubbing, cyanosis, or edema        MEDICATIONS  (STANDING):  amLODIPine   Tablet 10 milliGRAM(s) Oral daily  azithromycin  IVPB 500 milliGRAM(s) IV Intermittent every 24 hours  cefTRIAXone Injectable. 1000 milliGRAM(s) IV Push every 24 hours  dextrose 5%. 1000 milliLiter(s) (50 mL/Hr) IV Continuous <Continuous>  dextrose 50% Injectable 25 Gram(s) IV Push once  escitalopram 10 milliGRAM(s) Oral daily  glucagon  Injectable 1 milliGRAM(s) IntraMuscular once  insulin lispro (ADMELOG) corrective regimen sliding scale   SubCutaneous every 6 hours  oxybutynin XL 10 milliGRAM(s) Oral daily  perphenazine 4 milliGRAM(s) Oral every 12 hours  sodium chloride 0.9% lock flush 3 milliLiter(s) IV Push every 8 hours    MEDICATIONS  (PRN):  acetaminophen     Tablet .. 650 milliGRAM(s) Oral every 6 hours PRN Temp greater or equal to 38C (100.4F), Mild Pain (1 - 3)  albuterol/ipratropium for Nebulization 3 milliLiter(s) Nebulizer every 6 hours PRN Shortness of Breath and/or Wheezing  aluminum hydroxide/magnesium hydroxide/simethicone Suspension 30 milliLiter(s) Oral every 4 hours PRN Dyspepsia  dextrose Oral Gel 15 Gram(s) Oral once PRN Blood Glucose LESS THAN 70 milliGRAM(s)/deciliter  melatonin 3 milliGRAM(s) Oral at bedtime PRN Insomnia  ondansetron Injectable 4 milliGRAM(s) IV Push every 8 hours PRN Nausea and/or Vomiting      Allergies    No Known Allergies    Intolerances          LABS:                          12.6   9.76  )-----------( 298      ( 05 Dec 2024 04:51 )             38.4     12-05    140  |  103  |  18.2  ----------------------------<  120[H]  4.0   |  25.0  |  0.56    Ca    8.5      05 Dec 2024 04:51  Phos  3.5     12-04  Mg     2.4     12-04    TPro  6.5[L]  /  Alb  3.3  /  TBili  0.3[L]  /  DBili  x   /  AST  60[H]  /  ALT  38[H]  /  AlkPhos  102  12-05    PT/INR - ( 05 Dec 2024 04:51 )   PT: 11.7 sec;   INR: 1.01 ratio           Urinalysis Basic - ( 05 Dec 2024 04:51 )    Color: x / Appearance: x / SG: x / pH: x  Gluc: 120 mg/dL / Ketone: x  / Bili: x / Urobili: x   Blood: x / Protein: x / Nitrite: x   Leuk Esterase: x / RBC: x / WBC x   Sq Epi: x / Non Sq Epi: x / Bacteria: x        RADIOLOGY & ADDITIONAL TESTS:  
                                                         [facility]                                                         CARDIOLOGY AT [facility]                                                                  [address], [place] [address]                                                         Telephone: [phone]. Fax:[phone]                                                                             PROGRESS NOTE      Initial reason for Consult: risk stratification     Review of symptoms:   Cardiac:  No chest pain. No dyspnea. No palpitations.  Respiratory: no cough. No dyspnea  Gastrointestinal: No diarrhea. No abdominal pain. No bleeding.   Neuro: No focal neuro complaints.    Vitals:  T(C): 37 (12-03-24 @ 11:28), Max: 37 (12-03-24 @ 11:28)  HR: 63 (12-03-24 @ 11:28) (63 - 91)  BP: 151/80 (12-03-24 @ 11:28) (127/64 - 153/71)  RR: 18 (12-03-24 @ 11:28) (18 - 22)  SpO2: 95% (12-03-24 @ 11:28) (94% - 97%)  Wt(kg): --  I&O's Summary    Weight (kg): 115 (12-02 @ 10:55), 114 (11-30 @ 09:45)    PHYSICAL EXAM:  Appearance: Comfortable. No acute distress  HEENT:  Atraumatic. Normocephalic.  Normal oral mucosa  Neurologic: A & O x 3, no gross focal deficits.  Cardiovascular: RRR S1 S2, No murmur, no rubs/gallops. No JVD  Respiratory: Lungs diminished, left > right.   Gastrointestinal:  Soft, Non-tender, + BS  Lower Extremities: 2+ Peripheral Pulses, No clubbing, cyanosis, or edema  Psychiatry: Patient is calm. No agitation.   Skin: warm and dry.    CURRENT CARDIAC MEDICATIONS:  amLODIPine   Tablet 10 milliGRAM(s) Oral daily      CURRENT OTHER MEDICATIONS:  albuterol/ipratropium for Nebulization 3 milliLiter(s) Nebulizer every 6 hours PRN Shortness of Breath and/or Wheezing  azithromycin  IVPB 500 milliGRAM(s) IV Intermittent every 24 hours  cefTRIAXone Injectable. 1000 milliGRAM(s) IV Push every 24 hours  acetaminophen     Tablet .. 650 milliGRAM(s) Oral every 6 hours PRN Temp greater or equal to 38C (100.4F), Mild Pain (1 - 3)  escitalopram 10 milliGRAM(s) Oral daily  melatonin 3 milliGRAM(s) Oral at bedtime PRN Insomnia  ondansetron Injectable 4 milliGRAM(s) IV Push every 8 hours PRN Nausea and/or Vomiting  perphenazine 4 milliGRAM(s) Oral every 12 hours  aluminum hydroxide/magnesium hydroxide/simethicone Suspension 30 milliLiter(s) Oral every 4 hours PRN Dyspepsia  dextrose 50% Injectable 25 Gram(s) IV Push once, Stop order after: 1 Doses  dextrose Oral Gel 15 Gram(s) Oral once, Stop order after: 1 Doses PRN Blood Glucose LESS THAN 70 milliGRAM(s)/deciliter  glucagon  Injectable 1 milliGRAM(s) IntraMuscular once, Stop order after: 1 Doses  insulin lispro (ADMELOG) corrective regimen sliding scale   SubCutaneous every 6 hours  dextrose 5%. 1000 milliLiter(s) (50 mL/Hr) IV Continuous <Continuous>  heparin   Injectable 5000 Unit(s) SubCutaneous every 8 hours  oxybutynin XL 10 milliGRAM(s) Oral daily  sodium chloride 0.9% lock flush 3 milliLiter(s) IV Push every 8 hours      LABS:	 	                            12.6   7.17  )-----------( 283      ( 03 Dec 2024 04:15 )             36.7     12-03    137  |  103  |  12.6  ----------------------------<  314[H]  4.6   |  22.0  |  0.67    Ca    9.0      03 Dec 2024 04:15  Phos  2.6     12-03  Mg     2.2     12-03    TPro  7.1  /  Alb  3.6  /  TBili  0.4  /  DBili  x   /  AST  27  /  ALT  21  /  AlkPhos  115  12-02    PT/INR/PTT ( 02 Dec 2024 14:24 )                       :                       :      12.4         :       36.2                  .        .                   .              .           .       1.10        .                                       Lipid Profile: Date: 11-30 @ 12:24  Total cholesterol 158; Direct LDL: --; HDL: 71; Triglycerides:81    HgA1c:   TSH:         
   Subjective: Patient with no complaints.     Vital Signs:  Vital Signs Last 24 Hrs  T(C): 36.7 (12-04-24 @ 16:21), Max: 36.7 (12-04-24 @ 16:21)  T(F): 98.1 (12-04-24 @ 16:21), Max: 98.1 (12-04-24 @ 16:21)  HR: 68 (12-04-24 @ 16:21) (58 - 68)  BP: 127/74 (12-04-24 @ 16:21) (108/68 - 127/74)  RR: 18 (12-04-24 @ 16:21) (18 - 18)  SpO2: 97% (12-04-24 @ 16:21) (95% - 97%) on (O2)        General: NAD  Neurology: Awake, nonfocal, MCCABE x 4  Eyes: Scleras clear, EOMI, Gross vision intact  ENT: Gross hearing intact, grossly patent pharynx, no stridor  Neck: Neck supple, trachea midline  Respiratory: B/L BS  CV: S1S2, no murmurs, rubs or gallops    Relevant labs, radiology and Medications reviewed                        12.3   10.78 )-----------( 294      ( 04 Dec 2024 04:43 )             37.8     12-04    139  |  102  |  14.9  ----------------------------<  152[H]  4.1   |  26.0  |  0.51    Ca    9.3      04 Dec 2024 04:43  Phos  3.5     12-04  Mg     2.4     12-04    TPro  6.6  /  Alb  3.4  /  TBili  0.2[L]  /  DBili  x   /  AST  33[H]  /  ALT  25  /  AlkPhos  102  12-04      MEDICATIONS  (STANDING):  amLODIPine   Tablet 10 milliGRAM(s) Oral daily  azithromycin  IVPB 500 milliGRAM(s) IV Intermittent every 24 hours  cefTRIAXone Injectable. 1000 milliGRAM(s) IV Push every 24 hours  dextrose 5%. 1000 milliLiter(s) (50 mL/Hr) IV Continuous <Continuous>  dextrose 50% Injectable 25 Gram(s) IV Push once  escitalopram 10 milliGRAM(s) Oral daily  glucagon  Injectable 1 milliGRAM(s) IntraMuscular once  insulin lispro (ADMELOG) corrective regimen sliding scale   SubCutaneous every 6 hours  oxybutynin XL 10 milliGRAM(s) Oral daily  perphenazine 4 milliGRAM(s) Oral every 12 hours  sodium chloride 0.9% lock flush 3 milliLiter(s) IV Push every 8 hours    MEDICATIONS  (PRN):  acetaminophen     Tablet .. 650 milliGRAM(s) Oral every 6 hours PRN Temp greater or equal to 38C (100.4F), Mild Pain (1 - 3)  albuterol/ipratropium for Nebulization 3 milliLiter(s) Nebulizer every 6 hours PRN Shortness of Breath and/or Wheezing  aluminum hydroxide/magnesium hydroxide/simethicone Suspension 30 milliLiter(s) Oral every 4 hours PRN Dyspepsia  dextrose Oral Gel 15 Gram(s) Oral once PRN Blood Glucose LESS THAN 70 milliGRAM(s)/deciliter  melatonin 3 milliGRAM(s) Oral at bedtime PRN Insomnia  ondansetron Injectable 4 milliGRAM(s) IV Push every 8 hours PRN Nausea and/or Vomiting        Assessment  64y Female  w/ PAST MEDICAL & SURGICAL HISTORY:  Prediabetes      Hypertension      Morbid obesity      No significant past surgical history      admitted with complaints of Patient is a 64y old  Female who presents with a chief complaint of Chest Pain (04 Dec 2024 10:56)

## 2024-12-06 NOTE — CHART NOTE - NSCHARTNOTEFT_GEN_A_CORE
Patient POD# 1 s/p FB BAL and brushing for lingular lung nodule.   No further intervention.   Patient to follow up with Dr. Pa in the office within 2 weeks. no

## 2024-12-06 NOTE — DISCHARGE NOTE NURSING/CASE MANAGEMENT/SOCIAL WORK - PATIENT PORTAL LINK FT
You can access the FollowMyHealth Patient Portal offered by [facility] by registering at the following website: [email or web address]. By joining [employer]’s FollowMyHealth portal, you will also be able to view your health information using other applications (apps) compatible with our system.

## 2024-12-06 NOTE — DISCHARGE NOTE NURSING/CASE MANAGEMENT/SOCIAL WORK - FINANCIAL ASSISTANCE
[facility] provides services at a reduced cost to those who are determined to be eligible through [facility]’s financial assistance program. Information regarding [facility]’s financial assistance program can be found by going to [email or web address] or by calling [phone].
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
## 2024-12-06 NOTE — DISCHARGE NOTE NURSING/CASE MANAGEMENT/SOCIAL WORK - NSDCPEFALRISK_GEN_ALL_CORE
For information on Fall & Injury Prevention, visit: [email or web address] OR  [email or web address] OR  https://www.cdc.gov/steadi/patient.html

## 2024-12-06 NOTE — CHART NOTE - NSCHARTNOTEFT_GEN_A_CORE
confirmed with [facility] pharmacy [facility] is covered w/ $4 copay. She will follow up outpatient with Dr. Coker for ongoing preventative cardiology care within 2-4 weeks. Priorty is to see Pulmonary/Thoracic surgery. Discussed w/ patient [employer]  [id number] and she understands cardiology discharge plan with [employer].     we will sign off

## 2024-12-07 LAB
CULTURE RESULTS: SIGNIFICANT CHANGE UP
SPECIMEN SOURCE: SIGNIFICANT CHANGE UP

## 2024-12-09 ENCOUNTER — NON-APPOINTMENT (OUTPATIENT)
Age: 64
End: 2024-12-09

## 2024-12-09 LAB
P JIROVECII DNA L RESP QL NAA+NON-PROBE: NEGATIVE — SIGNIFICANT CHANGE UP
SPECIMEN SOURCE: SIGNIFICANT CHANGE UP

## 2024-12-10 LAB — NON-GYNECOLOGICAL CYTOLOGY STUDY: SIGNIFICANT CHANGE UP

## 2024-12-10 RX ORDER — TIRZEPATIDE 2.5 MG/.5ML
2.5 INJECTION, SOLUTION SUBCUTANEOUS
Qty: 1 | Refills: 0 | Status: ACTIVE | COMMUNITY
Start: 2024-12-10

## 2024-12-13 ENCOUNTER — TRANSCRIPTION ENCOUNTER (OUTPATIENT)
Age: 64
End: 2024-12-13

## 2024-12-13 PROBLEM — E66.01 MORBID (SEVERE) OBESITY DUE TO EXCESS CALORIES: Chronic | Status: ACTIVE | Noted: 2024-12-02

## 2024-12-13 PROBLEM — R73.03 PREDIABETES: Chronic | Status: ACTIVE | Noted: 2024-12-02

## 2024-12-13 PROBLEM — I10 ESSENTIAL (PRIMARY) HYPERTENSION: Chronic | Status: ACTIVE | Noted: 2024-12-02

## 2024-12-16 ENCOUNTER — TRANSCRIPTION ENCOUNTER (OUTPATIENT)
Age: 64
End: 2024-12-16

## 2024-12-16 ENCOUNTER — APPOINTMENT (OUTPATIENT)
Dept: CARE COORDINATION | Facility: HOME HEALTH | Age: 64
End: 2024-12-16
Payer: MEDICARE

## 2024-12-16 DIAGNOSIS — K21.9 GASTRO-ESOPHAGEAL REFLUX DISEASE W/OUT ESOPHAGITIS: ICD-10-CM

## 2024-12-16 DIAGNOSIS — R35.0 FREQUENCY OF MICTURITION: ICD-10-CM

## 2024-12-16 DIAGNOSIS — Z60.2 PROBLEMS RELATED TO LIVING ALONE: ICD-10-CM

## 2024-12-16 DIAGNOSIS — J18.9 PNEUMONIA, UNSPECIFIED ORGANISM: ICD-10-CM

## 2024-12-16 DIAGNOSIS — R73.03 PREDIABETES.: ICD-10-CM

## 2024-12-16 DIAGNOSIS — F32.A DEPRESSION, UNSPECIFIED: ICD-10-CM

## 2024-12-16 DIAGNOSIS — I10 ESSENTIAL (PRIMARY) HYPERTENSION: ICD-10-CM

## 2024-12-16 PROCEDURE — 99348 HOME/RES VST EST LOW MDM 30: CPT

## 2024-12-16 SDOH — SOCIAL STABILITY - SOCIAL INSECURITY: PROBLEMS RELATED TO LIVING ALONE: Z60.2

## 2024-12-17 ENCOUNTER — APPOINTMENT (OUTPATIENT)
Dept: ORTHOPEDIC SURGERY | Facility: CLINIC | Age: 64
End: 2024-12-17
Payer: COMMERCIAL

## 2024-12-17 DIAGNOSIS — M54.12 RADICULOPATHY, CERVICAL REGION: ICD-10-CM

## 2024-12-17 PROBLEM — Z00.00 ENCOUNTER FOR PREVENTIVE HEALTH EXAMINATION: Status: ACTIVE | Noted: 2024-12-17

## 2024-12-17 PROCEDURE — 99213 OFFICE O/P EST LOW 20 MIN: CPT

## 2024-12-17 RX ORDER — LIDOCAINE 5% 700 MG/1
5 PATCH TOPICAL
Qty: 1 | Refills: 1 | Status: ACTIVE | COMMUNITY
Start: 2024-12-17 | End: 1900-01-01

## 2024-12-18 ENCOUNTER — APPOINTMENT (OUTPATIENT)
Dept: CARDIOLOGY | Facility: CLINIC | Age: 64
End: 2024-12-18
Payer: MEDICARE

## 2024-12-18 ENCOUNTER — NON-APPOINTMENT (OUTPATIENT)
Age: 64
End: 2024-12-18

## 2024-12-18 VITALS
RESPIRATION RATE: 15 BRPM | DIASTOLIC BLOOD PRESSURE: 80 MMHG | OXYGEN SATURATION: 99 % | SYSTOLIC BLOOD PRESSURE: 130 MMHG | HEART RATE: 84 BPM

## 2024-12-18 VITALS
HEART RATE: 76 BPM | HEIGHT: 62 IN | SYSTOLIC BLOOD PRESSURE: 128 MMHG | OXYGEN SATURATION: 97 % | DIASTOLIC BLOOD PRESSURE: 82 MMHG | WEIGHT: 250 LBS | BODY MASS INDEX: 46.01 KG/M2

## 2024-12-18 VITALS — SYSTOLIC BLOOD PRESSURE: 124 MMHG | DIASTOLIC BLOOD PRESSURE: 70 MMHG

## 2024-12-18 DIAGNOSIS — R73.03 PREDIABETES.: ICD-10-CM

## 2024-12-18 DIAGNOSIS — R07.89 OTHER CHEST PAIN: ICD-10-CM

## 2024-12-18 DIAGNOSIS — I10 ESSENTIAL (PRIMARY) HYPERTENSION: ICD-10-CM

## 2024-12-18 PROBLEM — J18.9 COMMUNITY ACQUIRED PNEUMONIA OF RIGHT LOWER LOBE OF LUNG: Status: ACTIVE | Noted: 2024-12-18

## 2024-12-18 PROBLEM — K21.9 GERD (GASTROESOPHAGEAL REFLUX DISEASE): Status: ACTIVE | Noted: 2024-12-18

## 2024-12-18 PROBLEM — F32.A DEPRESSION, UNSPECIFIED DEPRESSION TYPE: Status: ACTIVE | Noted: 2024-12-18

## 2024-12-18 PROBLEM — Z60.2 LIVES ALONE WITH HELP AVAILABLE: Status: ACTIVE | Noted: 2024-12-18

## 2024-12-18 PROBLEM — R35.0 FREQUENT URINATION: Status: ACTIVE | Noted: 2024-12-18

## 2024-12-18 PROCEDURE — 93000 ELECTROCARDIOGRAM COMPLETE: CPT

## 2024-12-18 PROCEDURE — 99204 OFFICE O/P NEW MOD 45 MIN: CPT

## 2024-12-18 RX ORDER — METHYLPREDNISOLONE 4 MG/1
4 TABLET ORAL
Qty: 21 | Refills: 0 | Status: COMPLETED | COMMUNITY
Start: 2024-07-09

## 2024-12-18 RX ORDER — ACETAMINOPHEN 500 MG/1
500 TABLET ORAL
Refills: 0 | Status: ACTIVE | COMMUNITY

## 2024-12-18 RX ORDER — AZITHROMYCIN 500 MG/1
500 TABLET, FILM COATED ORAL
Qty: 5 | Refills: 0 | Status: COMPLETED | COMMUNITY
Start: 2024-09-18

## 2024-12-18 RX ORDER — SOLIFENACIN SUCCINATE 10 MG/1
10 TABLET ORAL
Qty: 90 | Refills: 0 | Status: ACTIVE | COMMUNITY
Start: 2024-09-18

## 2024-12-18 RX ORDER — CYCLOBENZAPRINE HYDROCHLORIDE 10 MG/1
10 TABLET, FILM COATED ORAL
Qty: 20 | Refills: 0 | Status: ACTIVE | COMMUNITY
Start: 2024-07-05

## 2024-12-18 RX ORDER — CEFPODOXIME PROXETIL 200 MG/1
200 TABLET, FILM COATED ORAL
Qty: 12 | Refills: 0 | Status: COMPLETED | COMMUNITY
Start: 2024-12-06

## 2024-12-18 RX ORDER — AZITHROMYCIN 250 MG/1
250 TABLET, FILM COATED ORAL
Qty: 6 | Refills: 0 | Status: COMPLETED | COMMUNITY
Start: 2024-11-30

## 2024-12-18 RX ORDER — PERPHENAZINE 4 MG/1
4 TABLET ORAL
Qty: 180 | Refills: 0 | Status: ACTIVE | COMMUNITY
Start: 2024-07-26

## 2024-12-18 RX ORDER — TIRZEPATIDE 5 MG/.5ML
5 INJECTION, SOLUTION SUBCUTANEOUS
Qty: 4 | Refills: 3 | Status: ACTIVE | COMMUNITY
Start: 2024-12-18 | End: 1900-01-01

## 2024-12-18 RX ORDER — CEFDINIR 300 MG/1
300 CAPSULE ORAL
Qty: 20 | Refills: 0 | Status: COMPLETED | COMMUNITY
Start: 2024-11-30

## 2024-12-18 RX ORDER — TIRZEPATIDE 2.5 MG/.5ML
2.5 INJECTION, SOLUTION SUBCUTANEOUS
Qty: 2 | Refills: 0 | Status: ACTIVE | COMMUNITY
Start: 2024-12-06

## 2024-12-19 RX ORDER — SOLIFENACIN SUCCINATE 10 MG/1
1 TABLET, COATED ORAL
Refills: 0 | DISCHARGE

## 2024-12-19 RX ORDER — ESCITALOPRAM OXALATE 10 MG/1
1 TABLET, FILM COATED ORAL
Refills: 0 | DISCHARGE

## 2024-12-19 RX ORDER — AMLODIPINE BESYLATE 10 MG/1
1 TABLET ORAL
Refills: 0 | DISCHARGE

## 2024-12-19 RX ORDER — PERPHENAZINE 4 MG
1 TABLET ORAL
Refills: 0 | DISCHARGE

## 2024-12-20 ENCOUNTER — NON-APPOINTMENT (OUTPATIENT)
Age: 64
End: 2024-12-20

## 2024-12-23 ENCOUNTER — TRANSCRIPTION ENCOUNTER (OUTPATIENT)
Age: 64
End: 2024-12-23

## 2024-12-23 PROBLEM — R73.03 PREDIABETES: Chronic | Status: ACTIVE | Noted: 2024-12-02

## 2024-12-23 PROBLEM — E66.01 MORBID (SEVERE) OBESITY DUE TO EXCESS CALORIES: Chronic | Status: ACTIVE | Noted: 2024-12-02

## 2024-12-23 PROBLEM — I10 ESSENTIAL (PRIMARY) HYPERTENSION: Chronic | Status: ACTIVE | Noted: 2024-12-02

## 2024-12-26 ENCOUNTER — APPOINTMENT (OUTPATIENT)
Dept: THORACIC SURGERY | Facility: CLINIC | Age: 64
End: 2024-12-26
Payer: MEDICARE

## 2024-12-26 ENCOUNTER — OUTPATIENT (OUTPATIENT)
Dept: OUTPATIENT SERVICES | Facility: HOSPITAL | Age: 64
LOS: 1 days | End: 2024-12-26
Payer: MEDICARE

## 2024-12-26 ENCOUNTER — NON-APPOINTMENT (OUTPATIENT)
Age: 64
End: 2024-12-26

## 2024-12-26 VITALS
BODY MASS INDEX: 46.01 KG/M2 | SYSTOLIC BLOOD PRESSURE: 134 MMHG | OXYGEN SATURATION: 98 % | WEIGHT: 250 LBS | TEMPERATURE: 98.3 F | HEIGHT: 62 IN | HEART RATE: 70 BPM | DIASTOLIC BLOOD PRESSURE: 81 MMHG | RESPIRATION RATE: 16 BRPM

## 2024-12-26 DIAGNOSIS — R91.1 SOLITARY PULMONARY NODULE: ICD-10-CM

## 2024-12-26 PROCEDURE — 71046 X-RAY EXAM CHEST 2 VIEWS: CPT | Mod: 26

## 2024-12-26 PROCEDURE — 99204 OFFICE O/P NEW MOD 45 MIN: CPT

## 2024-12-26 PROCEDURE — 99214 OFFICE O/P EST MOD 30 MIN: CPT

## 2024-12-26 PROCEDURE — 71046 X-RAY EXAM CHEST 2 VIEWS: CPT

## 2024-12-26 RX ORDER — ESCITALOPRAM OXALATE 10 MG/1
10 TABLET ORAL
Qty: 90 | Refills: 0 | Status: COMPLETED | COMMUNITY
Start: 2024-09-20 | End: 2024-12-26

## 2024-12-26 RX ORDER — FLUTICASONE PROPIONATE 50 UG/1
50 SPRAY, METERED NASAL
Qty: 16 | Refills: 0 | Status: COMPLETED | COMMUNITY
Start: 2024-09-18 | End: 2024-12-26

## 2024-12-26 RX ORDER — METFORMIN ER 750 MG 750 MG/1
750 TABLET ORAL
Qty: 180 | Refills: 0 | Status: COMPLETED | COMMUNITY
Start: 2024-06-23 | End: 2024-12-26

## 2024-12-26 RX ORDER — AMLODIPINE BESYLATE 10 MG/1
10 TABLET ORAL
Qty: 90 | Refills: 0 | Status: COMPLETED | COMMUNITY
Start: 2024-11-22 | End: 2024-12-26

## 2024-12-30 ENCOUNTER — APPOINTMENT (OUTPATIENT)
Dept: INTERNAL MEDICINE | Facility: CLINIC | Age: 64
End: 2024-12-30
Payer: MEDICARE

## 2024-12-30 ENCOUNTER — TRANSCRIPTION ENCOUNTER (OUTPATIENT)
Age: 64
End: 2024-12-30

## 2024-12-30 VITALS
HEART RATE: 70 BPM | HEIGHT: 62 IN | WEIGHT: 248 LBS | TEMPERATURE: 97.8 F | SYSTOLIC BLOOD PRESSURE: 139 MMHG | RESPIRATION RATE: 16 BRPM | BODY MASS INDEX: 45.64 KG/M2 | OXYGEN SATURATION: 96 % | DIASTOLIC BLOOD PRESSURE: 85 MMHG

## 2024-12-30 DIAGNOSIS — R91.1 SOLITARY PULMONARY NODULE: ICD-10-CM

## 2024-12-30 DIAGNOSIS — K74.60 UNSPECIFIED CIRRHOSIS OF LIVER: ICD-10-CM

## 2024-12-30 DIAGNOSIS — I10 ESSENTIAL (PRIMARY) HYPERTENSION: ICD-10-CM

## 2024-12-30 DIAGNOSIS — F29 UNSPECIFIED PSYCHOSIS NOT DUE TO A SUBSTANCE OR KNOWN PHYSIOLOGICAL CONDITION: ICD-10-CM

## 2024-12-30 PROCEDURE — 99215 OFFICE O/P EST HI 40 MIN: CPT

## 2024-12-30 PROCEDURE — G2211 COMPLEX E/M VISIT ADD ON: CPT

## 2024-12-30 PROCEDURE — 36415 COLL VENOUS BLD VENIPUNCTURE: CPT

## 2024-12-31 ENCOUNTER — TRANSCRIPTION ENCOUNTER (OUTPATIENT)
Age: 64
End: 2024-12-31

## 2025-01-02 LAB
AMYLASE/CREAT SERPL: 73 U/L
GGT SERPL-CCNC: 41 U/L
HAV IGM SER QL: NONREACTIVE
HBV CORE IGM SER QL: NONREACTIVE
HBV SURFACE AG SER QL: NONREACTIVE
HCV AB SER QL: NONREACTIVE
HCV S/CO RATIO: 0.11 S/CO
LPL SERPL-CCNC: 25 U/L
MITOCHONDRIA AB SER IF-ACNC: NORMAL
SMOOTH MUSCLE AB SER QL IF: ABNORMAL

## 2025-01-06 ENCOUNTER — TRANSCRIPTION ENCOUNTER (OUTPATIENT)
Age: 65
End: 2025-01-06

## 2025-01-06 ENCOUNTER — OUTPATIENT (OUTPATIENT)
Dept: OUTPATIENT SERVICES | Facility: HOSPITAL | Age: 65
LOS: 1 days | End: 2025-01-06
Payer: MEDICARE

## 2025-01-06 ENCOUNTER — APPOINTMENT (OUTPATIENT)
Dept: ULTRASOUND IMAGING | Facility: CLINIC | Age: 65
End: 2025-01-06
Payer: MEDICARE

## 2025-01-06 DIAGNOSIS — K74.60 UNSPECIFIED CIRRHOSIS OF LIVER: ICD-10-CM

## 2025-01-06 DIAGNOSIS — Z98.89 OTHER SPECIFIED POSTPROCEDURAL STATES: Chronic | ICD-10-CM

## 2025-01-06 PROCEDURE — 76700 US EXAM ABDOM COMPLETE: CPT | Mod: 26

## 2025-01-06 PROCEDURE — 76700 US EXAM ABDOM COMPLETE: CPT

## 2025-01-07 DIAGNOSIS — K76.0 FATTY (CHANGE OF) LIVER, NOT ELSEWHERE CLASSIFIED: ICD-10-CM

## 2025-01-07 DIAGNOSIS — N28.1 CYST OF KIDNEY, ACQUIRED: ICD-10-CM

## 2025-01-14 ENCOUNTER — APPOINTMENT (OUTPATIENT)
Dept: ORTHOPEDIC SURGERY | Facility: CLINIC | Age: 65
End: 2025-01-14
Payer: COMMERCIAL

## 2025-01-14 DIAGNOSIS — S46.012A STRAIN OF MUSCLE(S) AND TENDON(S) OF THE ROTATOR CUFF OF LEFT SHOULDER, INITIAL ENCOUNTER: ICD-10-CM

## 2025-01-14 DIAGNOSIS — S46.011A STRAIN OF MUSCLE(S) AND TENDON(S) OF THE ROTATOR CUFF OF RIGHT SHOULDER, INITIAL ENCOUNTER: ICD-10-CM

## 2025-01-14 DIAGNOSIS — M17.12 UNILATERAL PRIMARY OSTEOARTHRITIS, LEFT KNEE: ICD-10-CM

## 2025-01-14 DIAGNOSIS — S80.02XA CONTUSION OF LEFT KNEE, INITIAL ENCOUNTER: ICD-10-CM

## 2025-01-14 PROCEDURE — 99213 OFFICE O/P EST LOW 20 MIN: CPT

## 2025-01-16 ENCOUNTER — APPOINTMENT (OUTPATIENT)
Dept: CT IMAGING | Facility: CLINIC | Age: 65
End: 2025-01-16
Payer: MEDICARE

## 2025-01-16 ENCOUNTER — OUTPATIENT (OUTPATIENT)
Dept: OUTPATIENT SERVICES | Facility: HOSPITAL | Age: 65
LOS: 1 days | End: 2025-01-16
Payer: MEDICARE

## 2025-01-16 DIAGNOSIS — K76.0 FATTY (CHANGE OF) LIVER, NOT ELSEWHERE CLASSIFIED: ICD-10-CM

## 2025-01-16 DIAGNOSIS — Z98.89 OTHER SPECIFIED POSTPROCEDURAL STATES: Chronic | ICD-10-CM

## 2025-01-16 DIAGNOSIS — N26.1 ATROPHY OF KIDNEY (TERMINAL): ICD-10-CM

## 2025-01-16 PROCEDURE — 74160 CT ABDOMEN W/CONTRAST: CPT | Mod: 26

## 2025-01-16 PROCEDURE — 74170 CT ABD WO CNTRST FLWD CNTRST: CPT

## 2025-01-16 PROCEDURE — 74160 CT ABDOMEN W/CONTRAST: CPT

## 2025-01-21 DIAGNOSIS — K74.60 UNSPECIFIED CIRRHOSIS OF LIVER: ICD-10-CM

## 2025-01-28 ENCOUNTER — APPOINTMENT (OUTPATIENT)
Dept: ORTHOPEDIC SURGERY | Facility: CLINIC | Age: 65
End: 2025-01-28
Payer: COMMERCIAL

## 2025-01-28 DIAGNOSIS — M54.16 RADICULOPATHY, LUMBAR REGION: ICD-10-CM

## 2025-01-28 DIAGNOSIS — M54.12 RADICULOPATHY, CERVICAL REGION: ICD-10-CM

## 2025-01-28 PROCEDURE — 99213 OFFICE O/P EST LOW 20 MIN: CPT

## 2025-01-30 ENCOUNTER — APPOINTMENT (OUTPATIENT)
Dept: PULMONOLOGY | Facility: CLINIC | Age: 65
End: 2025-01-30
Payer: MEDICARE

## 2025-01-30 VITALS — WEIGHT: 237 LBS | HEIGHT: 59 IN | BODY MASS INDEX: 47.78 KG/M2

## 2025-01-30 VITALS
OXYGEN SATURATION: 95 % | HEART RATE: 78 BPM | SYSTOLIC BLOOD PRESSURE: 118 MMHG | RESPIRATION RATE: 16 BRPM | DIASTOLIC BLOOD PRESSURE: 76 MMHG

## 2025-01-30 DIAGNOSIS — R91.8 OTHER NONSPECIFIC ABNORMAL FINDING OF LUNG FIELD: ICD-10-CM

## 2025-01-30 PROCEDURE — G2211 COMPLEX E/M VISIT ADD ON: CPT

## 2025-01-30 PROCEDURE — 99204 OFFICE O/P NEW MOD 45 MIN: CPT

## 2025-02-10 ENCOUNTER — APPOINTMENT (OUTPATIENT)
Dept: CARDIOLOGY | Facility: CLINIC | Age: 65
End: 2025-02-10
Payer: MEDICARE

## 2025-02-10 VITALS
HEART RATE: 73 BPM | HEIGHT: 59 IN | WEIGHT: 237 LBS | DIASTOLIC BLOOD PRESSURE: 80 MMHG | SYSTOLIC BLOOD PRESSURE: 148 MMHG | OXYGEN SATURATION: 96 % | BODY MASS INDEX: 47.78 KG/M2

## 2025-02-10 DIAGNOSIS — N64.9 DISORDER OF BREAST, UNSPECIFIED: ICD-10-CM

## 2025-02-10 DIAGNOSIS — E78.1 PURE HYPERGLYCERIDEMIA: ICD-10-CM

## 2025-02-10 DIAGNOSIS — K76.0 FATTY (CHANGE OF) LIVER, NOT ELSEWHERE CLASSIFIED: ICD-10-CM

## 2025-02-10 DIAGNOSIS — R94.31 ABNORMAL ELECTROCARDIOGRAM [ECG] [EKG]: ICD-10-CM

## 2025-02-10 DIAGNOSIS — I10 ESSENTIAL (PRIMARY) HYPERTENSION: ICD-10-CM

## 2025-02-10 DIAGNOSIS — E66.01 MORBID (SEVERE) OBESITY DUE TO EXCESS CALORIES: ICD-10-CM

## 2025-02-10 DIAGNOSIS — G47.33 OBSTRUCTIVE SLEEP APNEA (ADULT) (PEDIATRIC): ICD-10-CM

## 2025-02-10 PROCEDURE — 93000 ELECTROCARDIOGRAM COMPLETE: CPT

## 2025-02-10 PROCEDURE — G0446: CPT | Mod: XU

## 2025-02-10 PROCEDURE — 99204 OFFICE O/P NEW MOD 45 MIN: CPT

## 2025-02-10 PROCEDURE — G0447 BEHAVIOR COUNSEL OBESITY 15M: CPT | Mod: 59

## 2025-02-10 PROCEDURE — G2211 COMPLEX E/M VISIT ADD ON: CPT

## 2025-05-26 NOTE — HISTORY OF PRESENT ILLNESS
[de-identified] : Here for follow up\par \par She did not get a chance to do labs\par \par She has been getting therapy with therapist for her anxiety/depression\par \par She states she has been constipated for past several months.  She states her stools smell strong.  She denies rectal bleeding or dark/black stools.  She denies abdominal pain.  She has been taking probiotic and metamucil with minimal relief.  \par \par She states her B/L knees have been hurting a lot.  No recent truama or falls.  She has not been able to see Orthopedics or start PT due to covid 19 outbreak normal sinus rhythm

## (undated) DEVICE — GLV 7.5 PROTEXIS (WHITE)

## (undated) DEVICE — TUBING CONNECTING 6MM 20FT

## (undated) DEVICE — SOL IRR POUR NS 0.9% 1000ML

## (undated) DEVICE — SOL IRR POUR H2O 1000ML

## (undated) DEVICE — TRAP SPECIMEN SPUTUM 40CC

## (undated) DEVICE — BRUSH CYTO DISP

## (undated) DEVICE — VENODYNE/SCD SLEEVE CALF MEDIUM

## (undated) DEVICE — VALVE BIOPSY BRONCHOVIDEOSCOPE

## (undated) DEVICE — SYR CONTROL LUER LOK 10CC

## (undated) DEVICE — WARMING BLANKET LOWER ADULT

## (undated) DEVICE — DRAPE 3/4 SHEET 52X76"

## (undated) DEVICE — VALVE SUCTION EVIS 160/200/240

## (undated) DEVICE — DRAPE XL SHEET 77X98"

## (undated) DEVICE — VISITEC 4X4